# Patient Record
Sex: FEMALE | Race: WHITE | NOT HISPANIC OR LATINO | Employment: FULL TIME | URBAN - METROPOLITAN AREA
[De-identification: names, ages, dates, MRNs, and addresses within clinical notes are randomized per-mention and may not be internally consistent; named-entity substitution may affect disease eponyms.]

---

## 2017-04-04 ENCOUNTER — HISTORICAL (OUTPATIENT)
Dept: LAB | Facility: HOSPITAL | Age: 42
End: 2017-04-04

## 2017-06-23 ENCOUNTER — HISTORICAL (OUTPATIENT)
Dept: LAB | Facility: HOSPITAL | Age: 42
End: 2017-06-23

## 2017-06-23 LAB
T3FREE SERPL-MCNC: 3.83 PG/ML (ref 2.18–3.98)
T4 SERPL-MCNC: 9.3 MCG/DL (ref 4.7–13.3)
TESTOST SERPL-MCNC: 75.5 NG/DL (ref 14–76)
TSH SERPL-ACNC: 1.31 MIU/ML (ref 0.36–3.74)

## 2017-11-30 ENCOUNTER — HISTORICAL (OUTPATIENT)
Dept: LAB | Facility: HOSPITAL | Age: 42
End: 2017-11-30

## 2017-11-30 LAB
ABS NEUT (OLG): 1.94 X10(3)/MCL (ref 2.1–9.2)
ALBUMIN SERPL-MCNC: 3.4 GM/DL (ref 3.4–5)
ALBUMIN/GLOB SERPL: 1 {RATIO}
ALP SERPL-CCNC: 73 UNIT/L (ref 45–117)
ALT SERPL-CCNC: 37 UNIT/L (ref 13–56)
APPEARANCE, UA: ABNORMAL
AST SERPL-CCNC: 54 UNIT/L (ref 15–37)
BACTERIA SPEC CULT: ABNORMAL /HPF
BILIRUB SERPL-MCNC: 0.3 MG/DL (ref 0.2–1)
BILIRUB UR QL STRIP: NEGATIVE
BILIRUBIN DIRECT+TOT PNL SERPL-MCNC: 0.1 MG/DL (ref 0–0.2)
BILIRUBIN DIRECT+TOT PNL SERPL-MCNC: 0.2 MG/DL (ref 0–1)
BUN SERPL-MCNC: 7 MG/DL (ref 7–18)
CALCIUM SERPL-MCNC: 8.6 MG/DL (ref 8.5–10.1)
CHLORIDE SERPL-SCNC: 103 MMOL/L (ref 98–107)
CO2 SERPL-SCNC: 23 MMOL/L (ref 21–32)
COLOR UR: YELLOW
CREAT SERPL-MCNC: 0.89 MG/DL (ref 0.55–1.02)
ERYTHROCYTE [DISTWIDTH] IN BLOOD BY AUTOMATED COUNT: 13.3 % (ref 11.5–17)
GLOBULIN SER-MCNC: 4 GM/DL (ref 2–4)
GLUCOSE (UA): NEGATIVE
GLUCOSE SERPL-MCNC: 94 MG/DL (ref 74–106)
HCT VFR BLD AUTO: 39.4 % (ref 37–47)
HGB BLD-MCNC: 13.6 GM/DL (ref 12–16)
HGB UR QL STRIP: ABNORMAL
KETONES UR QL STRIP: NEGATIVE
LEUKOCYTE ESTERASE UR QL STRIP: ABNORMAL
MCH RBC QN AUTO: 35.3 PG (ref 27–31)
MCHC RBC AUTO-ENTMCNC: 34.5 GM/DL (ref 33–36)
MCV RBC AUTO: 102.3 FL (ref 80–94)
NITRITE UR QL STRIP: NEGATIVE
PH UR STRIP: 6 [PH] (ref 5–7)
PLATELET # BLD AUTO: 226 X10(3)/MCL (ref 130–400)
PMV BLD AUTO: 9.6 FL (ref 7.4–10.4)
POTASSIUM SERPL-SCNC: 3.7 MMOL/L (ref 3.5–5.1)
PROT SERPL-MCNC: 7.7 GM/DL (ref 6.4–8.2)
PROT UR QL STRIP: ABNORMAL
RBC # BLD AUTO: 3.85 X10(6)/MCL (ref 4.2–5.4)
RBC #/AREA URNS HPF: ABNORMAL /HPF
SODIUM SERPL-SCNC: 139 MMOL/L (ref 136–145)
SP GR UR STRIP: 1.02 (ref 1–1.03)
SQUAMOUS EPITHELIAL, UA: ABNORMAL /LPF
UROBILINOGEN UR STRIP-ACNC: NEGATIVE
WBC # SPEC AUTO: 4.1 X10(3)/MCL (ref 4.5–11.5)
WBC #/AREA URNS HPF: ABNORMAL /HPF

## 2017-12-02 LAB — FINAL CULTURE: NO GROWTH

## 2018-02-01 ENCOUNTER — HISTORICAL (OUTPATIENT)
Dept: LAB | Facility: HOSPITAL | Age: 43
End: 2018-02-01

## 2018-02-01 LAB
ALBUMIN SERPL-MCNC: 3.6 GM/DL (ref 3.4–5)
BUN SERPL-MCNC: 10 MG/DL (ref 7–18)
CALCIUM SERPL-MCNC: 8.6 MG/DL (ref 8.5–10.1)
CHLORIDE SERPL-SCNC: 98 MMOL/L (ref 98–107)
CO2 SERPL-SCNC: 26 MMOL/L (ref 21–32)
CREAT SERPL-MCNC: 0.83 MG/DL (ref 0.55–1.02)
GLUCOSE SERPL-MCNC: 103 MG/DL (ref 74–106)
MAGNESIUM SERPL-MCNC: 1.6 MG/DL (ref 1.8–2.4)
PHOSPHATE SERPL-MCNC: 2.9 MG/DL (ref 2.5–4.9)
POTASSIUM SERPL-SCNC: 3.7 MMOL/L (ref 3.5–5.1)
SODIUM SERPL-SCNC: 134 MMOL/L (ref 136–145)
URATE SERPL-MCNC: 9.3 MG/DL (ref 2.6–6)

## 2018-05-10 ENCOUNTER — HISTORICAL (OUTPATIENT)
Dept: LAB | Facility: HOSPITAL | Age: 43
End: 2018-05-10

## 2018-05-10 LAB
ABS NEUT (OLG): 2.3 X10(3)/MCL (ref 2.1–9.2)
ALBUMIN SERPL-MCNC: 3.6 GM/DL (ref 3.4–5)
ALBUMIN/GLOB SERPL: 1 {RATIO}
ALP SERPL-CCNC: 66 UNIT/L (ref 45–117)
ALT SERPL-CCNC: 27 UNIT/L (ref 13–56)
APPEARANCE, UA: CLEAR
AST SERPL-CCNC: 26 UNIT/L (ref 15–37)
BILIRUB SERPL-MCNC: 0.3 MG/DL (ref 0.2–1)
BILIRUB UR QL STRIP: NEGATIVE
BILIRUBIN DIRECT+TOT PNL SERPL-MCNC: <0.1 MG/DL (ref 0–0.2)
BILIRUBIN DIRECT+TOT PNL SERPL-MCNC: >0.2 MG/DL (ref 0–1)
BUN SERPL-MCNC: 9 MG/DL (ref 7–18)
CALCIUM SERPL-MCNC: 8.7 MG/DL (ref 8.5–10.1)
CHLORIDE SERPL-SCNC: 101 MMOL/L (ref 98–107)
CO2 SERPL-SCNC: 25 MMOL/L (ref 21–32)
COLOR UR: NORMAL
CREAT SERPL-MCNC: 0.84 MG/DL (ref 0.55–1.02)
DEPRECATED CALCIDIOL+CALCIFEROL SERPL-MC: 35 NG/ML (ref 30–80)
ERYTHROCYTE [DISTWIDTH] IN BLOOD BY AUTOMATED COUNT: 13.7 % (ref 11.5–17)
GLOBULIN SER-MCNC: 4 GM/DL (ref 2–4)
GLUCOSE (UA): NEGATIVE
GLUCOSE SERPL-MCNC: 87 MG/DL (ref 74–106)
HCT VFR BLD AUTO: 37.7 % (ref 37–47)
HGB BLD-MCNC: 13.1 GM/DL (ref 12–16)
HGB UR QL STRIP: NEGATIVE
KETONES UR QL STRIP: NEGATIVE
LEUKOCYTE ESTERASE UR QL STRIP: NEGATIVE
MAGNESIUM SERPL-MCNC: 2.1 MG/DL (ref 1.8–2.4)
MCH RBC QN AUTO: 37.1 PG (ref 27–31)
MCHC RBC AUTO-ENTMCNC: 34.7 GM/DL (ref 33–36)
MCV RBC AUTO: 106.8 FL (ref 80–94)
NITRITE UR QL STRIP: NEGATIVE
PH UR STRIP: 6 [PH] (ref 5–7)
PLATELET # BLD AUTO: 262 X10(3)/MCL (ref 130–400)
PLATELET # BLD EST: ADEQUATE 10*3/UL
PMV BLD AUTO: 10.1 FL (ref 7.4–10.4)
POTASSIUM SERPL-SCNC: 3.4 MMOL/L (ref 3.5–5.1)
PROT SERPL-MCNC: 7.6 GM/DL (ref 6.4–8.2)
PROT UR QL STRIP: NEGATIVE
PTH-INTACT SERPL-MCNC: 57.8 PG/ML (ref 18.4–80.1)
RBC # BLD AUTO: 3.53 X10(6)/MCL (ref 4.2–5.4)
RBC MORPH BLD: ABNORMAL
SODIUM SERPL-SCNC: 138 MMOL/L (ref 136–145)
SP GR UR STRIP: 1.01 (ref 1–1.03)
TESTOST SERPL-MCNC: 186 NG/DL (ref 14–76)
URATE SERPL-MCNC: 10.9 MG/DL (ref 2.6–6)
UROBILINOGEN UR STRIP-ACNC: NEGATIVE
WBC # SPEC AUTO: 4.7 X10(3)/MCL (ref 4.5–11.5)

## 2020-09-18 ENCOUNTER — HISTORICAL (OUTPATIENT)
Dept: LAB | Facility: HOSPITAL | Age: 45
End: 2020-09-18

## 2020-09-18 LAB
ABS NEUT (OLG): 3.31 X10(3)/MCL (ref 2.1–9.2)
ALBUMIN SERPL-MCNC: 3.9 GM/DL (ref 3.5–5)
ALBUMIN/GLOB SERPL: 1.1 RATIO (ref 1.1–2)
ALP SERPL-CCNC: 101 UNIT/L (ref 40–150)
ALT SERPL-CCNC: 29 UNIT/L (ref 0–55)
ANISOCYTOSIS BLD QL SMEAR: NORMAL
APPEARANCE, UA: CLEAR
AST SERPL-CCNC: 40 UNIT/L (ref 5–34)
BACTERIA SPEC CULT: ABNORMAL
BILIRUB SERPL-MCNC: 0.4 MG/DL (ref 0.2–1.2)
BILIRUB UR QL STRIP: NEGATIVE
BILIRUBIN DIRECT+TOT PNL SERPL-MCNC: 0.2 MG/DL (ref 0–0.5)
BILIRUBIN DIRECT+TOT PNL SERPL-MCNC: 0.2 MG/DL (ref 0–0.8)
BUN SERPL-MCNC: 9.6 MG/DL (ref 7–18.7)
CALCIUM SERPL-MCNC: 9.7 MG/DL (ref 8.4–10.2)
CHLORIDE SERPL-SCNC: 98 MMOL/L (ref 98–107)
CO2 SERPL-SCNC: 26 MMOL/L (ref 22–29)
COLOR UR: ABNORMAL
CREAT SERPL-MCNC: 0.97 MG/DL (ref 0.57–1.11)
DEPRECATED CALCIDIOL+CALCIFEROL SERPL-MC: 58.3 NG/ML (ref 6.6–49.9)
ERYTHROCYTE [DISTWIDTH] IN BLOOD BY AUTOMATED COUNT: 15.5 % (ref 11.5–17)
FERRITIN SERPL-MCNC: 264.18 NG/ML (ref 4.63–204)
FSH SERPL-ACNC: 39.35 MIU/ML
GLOBULIN SER-MCNC: 3.4 GM/DL (ref 2.4–3.5)
GLUCOSE (UA): NEGATIVE
GLUCOSE SERPL-MCNC: 116 MG/DL (ref 74–100)
HCT VFR BLD AUTO: 35.1 % (ref 37–47)
HGB BLD-MCNC: 12.1 GM/DL (ref 12–16)
HGB UR QL STRIP: NEGATIVE
KETONES UR QL STRIP: NEGATIVE
LEUKOCYTE ESTERASE UR QL STRIP: ABNORMAL
MAGNESIUM SERPL-MCNC: 1.51 MG/DL (ref 1.6–2.6)
MCH RBC QN AUTO: 39.8 PG (ref 27–31)
MCHC RBC AUTO-ENTMCNC: 34.5 GM/DL (ref 33–36)
MCV RBC AUTO: 115.5 FL (ref 80–94)
MUCOUS THREADS URNS QL MICRO: ABNORMAL /LPF
NITRITE UR QL STRIP: NEGATIVE
NRBC BLD AUTO-RTO: 0 % (ref 0–0.2)
PH UR STRIP: 6.5 [PH] (ref 5–7)
PLATELET # BLD AUTO: 239 X10(3)/MCL (ref 130–400)
PLATELET # BLD EST: ADEQUATE 10*3/UL
PMV BLD AUTO: 10.2 FL (ref 7.4–10.4)
POLYCHROMASIA BLD QL SMEAR: SLIGHT
POTASSIUM SERPL-SCNC: 3.8 MMOL/L (ref 3.5–5.1)
PROT SERPL-MCNC: 7.3 GM/DL (ref 6.4–8.3)
PROT UR QL STRIP: NEGATIVE
RBC # BLD AUTO: 3.04 X10(6)/MCL (ref 4.2–5.4)
RBC #/AREA URNS HPF: 0 /[HPF]
SODIUM SERPL-SCNC: 137 MMOL/L (ref 136–145)
SP GR UR STRIP: 1.01 (ref 1–1.03)
SQUAMOUS EPITHELIAL, UA: ABNORMAL /LPF
T3FREE SERPL-MCNC: 2.98 PG/ML (ref 1.71–3.71)
T4 FREE SERPL-MCNC: 0.94 NG/DL (ref 0.7–1.48)
TESTOST SERPL-MCNC: 199.94 NG/DL (ref 13.84–53.35)
TSH SERPL-ACNC: 1.11 UIU/ML (ref 0.35–4.94)
URATE SERPL-MCNC: 4.8 MG/DL (ref 2.6–6)
UROBILINOGEN UR STRIP-ACNC: NEGATIVE
WBC # SPEC AUTO: 5.9 X10(3)/MCL (ref 4.5–11.5)
WBC #/AREA URNS HPF: ABNORMAL /HPF

## 2020-09-20 LAB — FINAL CULTURE: NORMAL

## 2022-06-01 LAB
HPV16+18+H RISK 12 DNA CVX-IMP: NEGATIVE
PAP RECOMMENDATION EXT: NORMAL

## 2022-06-09 ENCOUNTER — LAB VISIT (OUTPATIENT)
Dept: LAB | Facility: HOSPITAL | Age: 47
End: 2022-06-09
Attending: INTERNAL MEDICINE

## 2022-06-09 DIAGNOSIS — N28.1 ACQUIRED CYST OF KIDNEY: ICD-10-CM

## 2022-06-09 DIAGNOSIS — I10 ESSENTIAL HYPERTENSION, MALIGNANT: Primary | ICD-10-CM

## 2022-06-09 DIAGNOSIS — E55.9 VITAMIN D DEFICIENCY: ICD-10-CM

## 2022-06-09 DIAGNOSIS — E79.0 URICACIDEMIA: ICD-10-CM

## 2022-06-09 LAB
ALBUMIN SERPL-MCNC: 3.6 GM/DL (ref 3.5–5)
ALBUMIN/GLOB SERPL: 1 RATIO (ref 1.1–2)
ALP SERPL-CCNC: 70 UNIT/L (ref 40–150)
ALT SERPL-CCNC: 11 UNIT/L (ref 0–55)
APPEARANCE UR: CLEAR
AST SERPL-CCNC: 17 UNIT/L (ref 5–34)
BASOPHILS # BLD AUTO: 0.06 X10(3)/MCL (ref 0–0.2)
BASOPHILS NFR BLD AUTO: 0.8 %
BILIRUB UR QL STRIP.AUTO: ABNORMAL MG/DL
BILIRUBIN DIRECT+TOT PNL SERPL-MCNC: 0.4 MG/DL
BUN SERPL-MCNC: 9.4 MG/DL (ref 7–18.7)
CALCIUM SERPL-MCNC: 9.5 MG/DL (ref 8.4–10.2)
CHLORIDE SERPL-SCNC: 103 MMOL/L (ref 98–107)
CO2 SERPL-SCNC: 23 MMOL/L (ref 22–29)
COLOR UR AUTO: YELLOW
CREAT SERPL-MCNC: 0.95 MG/DL (ref 0.55–1.02)
DEPRECATED CALCIDIOL+CALCIFEROL SERPL-MC: 56.7 NG/ML (ref 30–80)
EOSINOPHIL # BLD AUTO: 0.14 X10(3)/MCL (ref 0–0.9)
EOSINOPHIL NFR BLD AUTO: 1.9 %
ERYTHROCYTE [DISTWIDTH] IN BLOOD BY AUTOMATED COUNT: 14.3 % (ref 11.5–17)
GLOBULIN SER-MCNC: 3.6 GM/DL (ref 2.4–3.5)
GLUCOSE SERPL-MCNC: 107 MG/DL (ref 74–100)
GLUCOSE UR QL STRIP.AUTO: NEGATIVE MG/DL
HCT VFR BLD AUTO: 38.3 % (ref 37–47)
HGB BLD-MCNC: 13.1 GM/DL (ref 12–16)
IMM GRANULOCYTES # BLD AUTO: 0.03 X10(3)/MCL (ref 0–0.02)
IMM GRANULOCYTES NFR BLD AUTO: 0.4 % (ref 0–0.43)
KETONES UR QL STRIP.AUTO: 15 MG/DL
LEUKOCYTE ESTERASE UR QL STRIP.AUTO: NEGATIVE UNIT/L
LYMPHOCYTES # BLD AUTO: 2.24 X10(3)/MCL (ref 0.6–4.6)
LYMPHOCYTES NFR BLD AUTO: 30.9 %
MACROCYTES BLD QL SMEAR: ABNORMAL
MCH RBC QN AUTO: 37.2 PG (ref 27–31)
MCHC RBC AUTO-ENTMCNC: 34.2 MG/DL (ref 33–36)
MCV RBC AUTO: 108.8 FL (ref 80–94)
MONOCYTES # BLD AUTO: 0.48 X10(3)/MCL (ref 0.1–1.3)
MONOCYTES NFR BLD AUTO: 6.6 %
NEUTROPHILS # BLD AUTO: 4.3 X10(3)/MCL (ref 2.1–9.2)
NEUTROPHILS NFR BLD AUTO: 59.4 %
NITRITE UR QL STRIP.AUTO: NEGATIVE
NRBC BLD AUTO-RTO: 0 %
PH UR STRIP.AUTO: 6.5 [PH]
PLATELET # BLD AUTO: 311 X10(3)/MCL (ref 130–400)
PLATELET # BLD EST: ABNORMAL 10*3/UL
PMV BLD AUTO: 10.1 FL (ref 9.4–12.4)
POLYCHROMASIA BLD QL SMEAR: SLIGHT
POTASSIUM SERPL-SCNC: 4.5 MMOL/L (ref 3.5–5.1)
PROT SERPL-MCNC: 7.2 GM/DL (ref 6.4–8.3)
PROT UR QL STRIP.AUTO: NEGATIVE MG/DL
PTH-INTACT SERPL-MCNC: 61.9 PG/ML (ref 8.7–77)
RBC # BLD AUTO: 3.52 X10(6)/MCL (ref 4.2–5.4)
RBC MORPH BLD: ABNORMAL
RBC UR QL AUTO: NEGATIVE UNIT/L
SODIUM SERPL-SCNC: 140 MMOL/L (ref 136–145)
SP GR UR STRIP.AUTO: 1.02
URATE SERPL-MCNC: 7.9 MG/DL (ref 2.6–6)
UROBILINOGEN UR STRIP-ACNC: 0.2 MG/DL
WBC # SPEC AUTO: 7.2 X10(3)/MCL (ref 4.5–11.5)

## 2022-06-09 PROCEDURE — 83970 ASSAY OF PARATHORMONE: CPT

## 2022-06-09 PROCEDURE — 81003 URINALYSIS AUTO W/O SCOPE: CPT

## 2022-06-09 PROCEDURE — 80053 COMPREHEN METABOLIC PANEL: CPT

## 2022-06-09 PROCEDURE — 36415 COLL VENOUS BLD VENIPUNCTURE: CPT

## 2022-06-09 PROCEDURE — 85025 COMPLETE CBC W/AUTO DIFF WBC: CPT

## 2022-06-09 PROCEDURE — 84550 ASSAY OF BLOOD/URIC ACID: CPT

## 2022-06-09 PROCEDURE — 82306 VITAMIN D 25 HYDROXY: CPT

## 2022-09-13 ENCOUNTER — HOSPITAL ENCOUNTER (INPATIENT)
Facility: HOSPITAL | Age: 47
LOS: 7 days | Discharge: HOME | DRG: 164 | End: 2022-09-21
Attending: EMERGENCY MEDICINE | Admitting: INTERNAL MEDICINE
Payer: MEDICARE

## 2022-09-13 DIAGNOSIS — R16.0 LIVER MASS: ICD-10-CM

## 2022-09-13 DIAGNOSIS — I82.409 DVT (DEEP VENOUS THROMBOSIS): ICD-10-CM

## 2022-09-13 DIAGNOSIS — I26.99 ACUTE PULMONARY EMBOLISM, UNSPECIFIED PULMONARY EMBOLISM TYPE, UNSPECIFIED WHETHER ACUTE COR PULMONALE PRESENT: Primary | ICD-10-CM

## 2022-09-13 DIAGNOSIS — R53.1 WEAKNESS: ICD-10-CM

## 2022-09-13 DIAGNOSIS — R07.9 CHEST PAIN: ICD-10-CM

## 2022-09-13 DIAGNOSIS — R11.2 INTRACTABLE VOMITING WITH NAUSEA, UNSPECIFIED VOMITING TYPE: ICD-10-CM

## 2022-09-13 DIAGNOSIS — I26.99 PULMONARY EMBOLUS: ICD-10-CM

## 2022-09-13 LAB
ALBUMIN SERPL-MCNC: 3 GM/DL (ref 3.5–5)
ALBUMIN/GLOB SERPL: 0.8 RATIO (ref 1.1–2)
ALP SERPL-CCNC: 91 UNIT/L (ref 40–150)
ALT SERPL-CCNC: 112 UNIT/L (ref 0–55)
ANISOCYTOSIS BLD QL SMEAR: ABNORMAL
APTT PPP: 31 SECONDS (ref 23.4–33.9)
AST SERPL-CCNC: 79 UNIT/L (ref 5–34)
B-OH-BUTYR SERPL-MCNC: 2.6 MMOL/L
BASOPHILS # BLD AUTO: 0.03 X10(3)/MCL (ref 0–0.2)
BASOPHILS NFR BLD AUTO: 0.3 %
BILIRUBIN DIRECT+TOT PNL SERPL-MCNC: 0.3 MG/DL
BUN SERPL-MCNC: 15.1 MG/DL (ref 7–18.7)
CALCIUM SERPL-MCNC: 9 MG/DL (ref 8.4–10.2)
CHLORIDE SERPL-SCNC: 108 MMOL/L (ref 98–107)
CO2 SERPL-SCNC: 17 MMOL/L (ref 22–29)
CREAT SERPL-MCNC: 1.16 MG/DL (ref 0.55–1.02)
EOSINOPHIL # BLD AUTO: 0.02 X10(3)/MCL (ref 0–0.9)
EOSINOPHIL NFR BLD AUTO: 0.2 %
ERYTHROCYTE [DISTWIDTH] IN BLOOD BY AUTOMATED COUNT: 15 % (ref 11.5–17)
FLUAV AG UPPER RESP QL IA.RAPID: NOT DETECTED
FLUBV AG UPPER RESP QL IA.RAPID: NOT DETECTED
GFR SERPLBLD CREATININE-BSD FMLA CKD-EPI: 58 MLS/MIN/1.73/M2
GLOBULIN SER-MCNC: 3.9 GM/DL (ref 2.4–3.5)
GLUCOSE SERPL-MCNC: 125 MG/DL (ref 74–100)
HCO3 UR-SCNC: 12.7 MMOL/L (ref 24–28)
HCT VFR BLD AUTO: 30 % (ref 37–47)
HGB BLD-MCNC: 10.7 GM/DL (ref 12–16)
IMM GRANULOCYTES # BLD AUTO: 0.05 X10(3)/MCL (ref 0–0.04)
IMM GRANULOCYTES NFR BLD AUTO: 0.5 %
INR BLD: 1.08 (ref 2–3)
LACTATE SERPL-SCNC: 2.1 MMOL/L (ref 0.5–2.2)
LIPASE SERPL-CCNC: 8 U/L
LYMPHOCYTES # BLD AUTO: 2 X10(3)/MCL (ref 0.6–4.6)
LYMPHOCYTES NFR BLD AUTO: 19.6 %
MACROCYTES BLD QL SMEAR: ABNORMAL
MAGNESIUM SERPL-MCNC: 2.1 MG/DL (ref 1.6–2.6)
MCH RBC QN AUTO: 43.5 PG (ref 27–31)
MCHC RBC AUTO-ENTMCNC: 35.7 MG/DL (ref 33–36)
MCV RBC AUTO: 122 FL (ref 80–94)
MONOCYTES # BLD AUTO: 0.76 X10(3)/MCL (ref 0.1–1.3)
MONOCYTES NFR BLD AUTO: 7.5 %
NEUTROPHILS # BLD AUTO: 7.3 X10(3)/MCL (ref 2.1–9.2)
NEUTROPHILS NFR BLD AUTO: 71.9 %
NRBC BLD AUTO-RTO: 0 %
PCO2 BLDA: 18.5 MMHG (ref 35–45)
PH SMN: 7.44 [PH] (ref 7.35–7.45)
PLATELET # BLD AUTO: 258 X10(3)/MCL (ref 130–400)
PLATELET # BLD EST: ADEQUATE 10*3/UL
PMV BLD AUTO: 10.4 FL (ref 7.4–10.4)
PO2 BLDA: 77 MMHG (ref 80–100)
POC BE: -11 MMOL/L
POC SATURATED O2: 96 % (ref 95–100)
POC TCO2: 13 MMOL/L (ref 23–27)
POTASSIUM SERPL-SCNC: 4.6 MMOL/L (ref 3.5–5.1)
PROT SERPL-MCNC: 6.9 GM/DL (ref 6.4–8.3)
PROTHROMBIN TIME: 13.8 SECONDS (ref 11.7–14.5)
RBC # BLD AUTO: 2.46 X10(6)/MCL (ref 4.2–5.4)
RBC MORPH BLD: ABNORMAL
SAMPLE: ABNORMAL
SARS-COV-2 RNA RESP QL NAA+PROBE: NOT DETECTED
SODIUM SERPL-SCNC: 140 MMOL/L (ref 136–145)
TROPONIN I SERPL-MCNC: 0.28 NG/ML (ref 0–0.04)
TROPONIN I SERPL-MCNC: 0.29 NG/ML (ref 0–0.04)
WBC # SPEC AUTO: 10.2 X10(3)/MCL (ref 4.5–11.5)

## 2022-09-13 PROCEDURE — 84484 ASSAY OF TROPONIN QUANT: CPT | Performed by: PHYSICIAN ASSISTANT

## 2022-09-13 PROCEDURE — 25500020 PHARM REV CODE 255: Performed by: EMERGENCY MEDICINE

## 2022-09-13 PROCEDURE — 96372 THER/PROPH/DIAG INJ SC/IM: CPT | Performed by: PHYSICIAN ASSISTANT

## 2022-09-13 PROCEDURE — 36415 COLL VENOUS BLD VENIPUNCTURE: CPT | Performed by: EMERGENCY MEDICINE

## 2022-09-13 PROCEDURE — 96376 TX/PRO/DX INJ SAME DRUG ADON: CPT

## 2022-09-13 PROCEDURE — 25000003 PHARM REV CODE 250: Performed by: PHYSICIAN ASSISTANT

## 2022-09-13 PROCEDURE — 82010 KETONE BODYS QUAN: CPT | Performed by: EMERGENCY MEDICINE

## 2022-09-13 PROCEDURE — 83735 ASSAY OF MAGNESIUM: CPT | Performed by: PHYSICIAN ASSISTANT

## 2022-09-13 PROCEDURE — 87636 SARSCOV2 & INF A&B AMP PRB: CPT | Performed by: PHYSICIAN ASSISTANT

## 2022-09-13 PROCEDURE — 36600 WITHDRAWAL OF ARTERIAL BLOOD: CPT

## 2022-09-13 PROCEDURE — 96366 THER/PROPH/DIAG IV INF ADDON: CPT

## 2022-09-13 PROCEDURE — 93010 ELECTROCARDIOGRAM REPORT: CPT | Mod: ,,, | Performed by: INTERNAL MEDICINE

## 2022-09-13 PROCEDURE — 99285 EMERGENCY DEPT VISIT HI MDM: CPT | Mod: 25

## 2022-09-13 PROCEDURE — 63600175 PHARM REV CODE 636 W HCPCS: Performed by: EMERGENCY MEDICINE

## 2022-09-13 PROCEDURE — 80053 COMPREHEN METABOLIC PANEL: CPT | Performed by: PHYSICIAN ASSISTANT

## 2022-09-13 PROCEDURE — 63600175 PHARM REV CODE 636 W HCPCS: Performed by: PHYSICIAN ASSISTANT

## 2022-09-13 PROCEDURE — 93010 EKG 12-LEAD: ICD-10-PCS | Mod: ,,, | Performed by: INTERNAL MEDICINE

## 2022-09-13 PROCEDURE — 93005 ELECTROCARDIOGRAM TRACING: CPT

## 2022-09-13 PROCEDURE — 85610 PROTHROMBIN TIME: CPT | Performed by: EMERGENCY MEDICINE

## 2022-09-13 PROCEDURE — 36415 COLL VENOUS BLD VENIPUNCTURE: CPT | Performed by: PHYSICIAN ASSISTANT

## 2022-09-13 PROCEDURE — 83605 ASSAY OF LACTIC ACID: CPT | Performed by: EMERGENCY MEDICINE

## 2022-09-13 PROCEDURE — 83690 ASSAY OF LIPASE: CPT | Performed by: PHYSICIAN ASSISTANT

## 2022-09-13 PROCEDURE — 99900035 HC TECH TIME PER 15 MIN (STAT)

## 2022-09-13 PROCEDURE — 82803 BLOOD GASES ANY COMBINATION: CPT

## 2022-09-13 PROCEDURE — 96365 THER/PROPH/DIAG IV INF INIT: CPT

## 2022-09-13 PROCEDURE — 96361 HYDRATE IV INFUSION ADD-ON: CPT

## 2022-09-13 PROCEDURE — 96375 TX/PRO/DX INJ NEW DRUG ADDON: CPT

## 2022-09-13 PROCEDURE — 85730 THROMBOPLASTIN TIME PARTIAL: CPT | Performed by: EMERGENCY MEDICINE

## 2022-09-13 PROCEDURE — 85025 COMPLETE CBC W/AUTO DIFF WBC: CPT | Performed by: PHYSICIAN ASSISTANT

## 2022-09-13 PROCEDURE — 87040 BLOOD CULTURE FOR BACTERIA: CPT | Performed by: EMERGENCY MEDICINE

## 2022-09-13 RX ORDER — ONDANSETRON 2 MG/ML
4 INJECTION INTRAMUSCULAR; INTRAVENOUS
Status: COMPLETED | OUTPATIENT
Start: 2022-09-13 | End: 2022-09-13

## 2022-09-13 RX ORDER — PROMETHAZINE HYDROCHLORIDE 25 MG/ML
25 INJECTION, SOLUTION INTRAMUSCULAR; INTRAVENOUS ONCE
Status: COMPLETED | OUTPATIENT
Start: 2022-09-13 | End: 2022-09-13

## 2022-09-13 RX ORDER — HEPARIN SODIUM,PORCINE/D5W 25000/250
18 INTRAVENOUS SOLUTION INTRAVENOUS CONTINUOUS
Status: DISCONTINUED | OUTPATIENT
Start: 2022-09-13 | End: 2022-09-14

## 2022-09-13 RX ORDER — ONDANSETRON 2 MG/ML
INJECTION INTRAMUSCULAR; INTRAVENOUS
Status: COMPLETED
Start: 2022-09-13 | End: 2022-09-13

## 2022-09-13 RX ORDER — SODIUM CHLORIDE, SODIUM LACTATE, POTASSIUM CHLORIDE, CALCIUM CHLORIDE 600; 310; 30; 20 MG/100ML; MG/100ML; MG/100ML; MG/100ML
1000 INJECTION, SOLUTION INTRAVENOUS
Status: COMPLETED | OUTPATIENT
Start: 2022-09-13 | End: 2022-09-13

## 2022-09-13 RX ORDER — DROPERIDOL 2.5 MG/ML
1.25 INJECTION, SOLUTION INTRAMUSCULAR; INTRAVENOUS
Status: COMPLETED | OUTPATIENT
Start: 2022-09-13 | End: 2022-09-13

## 2022-09-13 RX ADMIN — SODIUM CHLORIDE, POTASSIUM CHLORIDE, SODIUM LACTATE AND CALCIUM CHLORIDE 1000 ML: 600; 310; 30; 20 INJECTION, SOLUTION INTRAVENOUS at 07:09

## 2022-09-13 RX ADMIN — PROMETHAZINE HYDROCHLORIDE 25 MG: 25 INJECTION INTRAMUSCULAR; INTRAVENOUS at 07:09

## 2022-09-13 RX ADMIN — ONDANSETRON 4 MG: 2 INJECTION INTRAMUSCULAR; INTRAVENOUS at 05:09

## 2022-09-13 RX ADMIN — HEPARIN SODIUM 18 UNITS/KG/HR: 10000 INJECTION, SOLUTION INTRAVENOUS at 08:09

## 2022-09-13 RX ADMIN — IOPAMIDOL 100 ML: 755 INJECTION, SOLUTION INTRAVENOUS at 06:09

## 2022-09-13 RX ADMIN — SODIUM CHLORIDE 1000 ML: 9 INJECTION, SOLUTION INTRAVENOUS at 04:09

## 2022-09-13 RX ADMIN — DROPERIDOL 1.25 MG: 2.5 INJECTION, SOLUTION INTRAMUSCULAR; INTRAVENOUS at 04:09

## 2022-09-13 RX ADMIN — ONDANSETRON 4 MG: 2 INJECTION INTRAMUSCULAR; INTRAVENOUS at 09:09

## 2022-09-13 NOTE — Clinical Note
The right neck was prepped. The site was prepped with ChloraPrep. The patient was draped. The patient was positioned supine. The patient was secured with ulnar pads.

## 2022-09-13 NOTE — Clinical Note
The catheter was inserted into the  distal right iliac vein. Aspiration thrombectomy performed right iliac vein .

## 2022-09-13 NOTE — Clinical Note
The catheter was inserted into the  ostial right pulm artery. Angiogram performed right pulm artery.

## 2022-09-13 NOTE — Clinical Note
The catheter was inserted into the left pulm artery. An angiography was performed of the left pulm artery.

## 2022-09-13 NOTE — Clinical Note
A venogram was performed of the inferior vena cava. The vessel was injected via power injection The venogram syringe was removed and the venogram is complete.

## 2022-09-13 NOTE — Clinical Note
The catheter was inserted into the left pulm artery. An angiography was performed of the left pulm artery. The angiography was performed via hand injection with .

## 2022-09-13 NOTE — Clinical Note
The catheter was repositioned into the left pulm artery. Aspiration thrombectomy performed left pulm artery

## 2022-09-13 NOTE — Clinical Note
The catheter was inserted into the left pulm artery. Aspiration thrombectomy performed  left pulm artery

## 2022-09-14 ENCOUNTER — APPOINTMENT (OUTPATIENT)
Dept: CARDIOLOGY | Facility: HOSPITAL | Age: 47
End: 2022-09-14

## 2022-09-14 PROBLEM — I26.99 BILATERAL PULMONARY EMBOLISM: Status: ACTIVE | Noted: 2022-09-14

## 2022-09-14 LAB
ALBUMIN SERPL-MCNC: 3 GM/DL (ref 3.5–5)
ALBUMIN/GLOB SERPL: 0.9 RATIO (ref 1.1–2)
ALP SERPL-CCNC: 97 UNIT/L (ref 40–150)
ALT SERPL-CCNC: 142 UNIT/L (ref 0–55)
AMPHET UR QL SCN: NEGATIVE
APPEARANCE UR: CLEAR
APTT PPP: 38.6 SECONDS (ref 23.2–33.7)
APTT PPP: 38.7 SECONDS (ref 23.2–33.7)
APTT PPP: 43.5 SECONDS (ref 23.2–33.7)
APTT PPP: 46 SECONDS (ref 23.2–33.7)
AST SERPL-CCNC: 125 UNIT/L (ref 5–34)
AV INDEX (PROSTH): 0.64
AV MEAN GRADIENT: 5 MMHG
AV PEAK GRADIENT: 10 MMHG
AV VALVE AREA: 12.02 CM2
AV VELOCITY RATIO: 0.59
B-HCG SERPL QL: NEGATIVE
BACTERIA #/AREA URNS AUTO: ABNORMAL /HPF
BARBITURATE SCN PRESENT UR: NEGATIVE
BASOPHILS # BLD AUTO: 0.02 X10(3)/MCL (ref 0–0.2)
BASOPHILS # BLD AUTO: 0.03 X10(3)/MCL (ref 0–0.2)
BASOPHILS NFR BLD AUTO: 0.2 %
BASOPHILS NFR BLD AUTO: 0.3 %
BENZODIAZ UR QL SCN: NEGATIVE
BILIRUB UR QL STRIP.AUTO: 2 MG/DL
BILIRUBIN DIRECT+TOT PNL SERPL-MCNC: 0.4 MG/DL
BNP BLD-MCNC: 1419.4 PG/ML
BSA FOR ECHO PROCEDURE: 1.84 M2
BUN SERPL-MCNC: 14 MG/DL (ref 7–18.7)
CALCIUM SERPL-MCNC: 8.4 MG/DL (ref 8.4–10.2)
CANNABINOIDS UR QL SCN: POSITIVE
CHLORIDE SERPL-SCNC: 106 MMOL/L (ref 98–107)
CO2 SERPL-SCNC: 14 MMOL/L (ref 22–29)
COCAINE UR QL SCN: NEGATIVE
COLOR UR AUTO: YELLOW
CREAT SERPL-MCNC: 1.03 MG/DL (ref 0.55–1.02)
CV ECHO LV RWT: 0.72 CM
DOP CALC AO PEAK VEL: 1.55 M/S
DOP CALC AO VTI: 19.6 CM
DOP CALC LVOT AREA: 18.8 CM2
DOP CALC LVOT DIAMETER: 4.9 CM
DOP CALC LVOT PEAK VEL: 0.92 M/S
DOP CALC LVOT STROKE VOLUME: 235.6 CM3
DOP CALCLVOT PEAK VEL VTI: 12.5 CM
E WAVE DECELERATION TIME: 169 MSEC
E/A RATIO: 0.89
E/E' RATIO: 7.83 M/S
ECHO LV POSTERIOR WALL: 1.24 CM (ref 0.6–1.1)
EJECTION FRACTION: 60 %
EOSINOPHIL # BLD AUTO: 0.01 X10(3)/MCL (ref 0–0.9)
EOSINOPHIL # BLD AUTO: 0.02 X10(3)/MCL (ref 0–0.9)
EOSINOPHIL NFR BLD AUTO: 0.1 %
EOSINOPHIL NFR BLD AUTO: 0.2 %
ERYTHROCYTE [DISTWIDTH] IN BLOOD BY AUTOMATED COUNT: 15 % (ref 11.5–17)
ERYTHROCYTE [DISTWIDTH] IN BLOOD BY AUTOMATED COUNT: 15.2 % (ref 11.5–17)
FENTANYL UR QL SCN: NEGATIVE
FRACTIONAL SHORTENING: 35 % (ref 28–44)
GFR SERPLBLD CREATININE-BSD FMLA CKD-EPI: >60 MLS/MIN/1.73/M2
GLOBULIN SER-MCNC: 3.2 GM/DL (ref 2.4–3.5)
GLUCOSE SERPL-MCNC: 125 MG/DL (ref 74–100)
GLUCOSE UR QL STRIP.AUTO: NEGATIVE MG/DL
HCT VFR BLD AUTO: 26.6 % (ref 37–47)
HCT VFR BLD AUTO: 29.1 % (ref 37–47)
HGB BLD-MCNC: 9.7 GM/DL (ref 12–16)
HGB BLD-MCNC: 9.9 GM/DL (ref 12–16)
IMM GRANULOCYTES # BLD AUTO: 0.04 X10(3)/MCL (ref 0–0.04)
IMM GRANULOCYTES # BLD AUTO: 0.05 X10(3)/MCL (ref 0–0.04)
IMM GRANULOCYTES NFR BLD AUTO: 0.4 %
IMM GRANULOCYTES NFR BLD AUTO: 0.5 %
INTERVENTRICULAR SEPTUM: 1.06 CM (ref 0.6–1.1)
KETONES UR QL STRIP.AUTO: ABNORMAL MG/DL
LACTATE SERPL-SCNC: 1.5 MMOL/L (ref 0.5–2.2)
LACTATE SERPL-SCNC: 2.2 MMOL/L (ref 0.5–2.2)
LEFT ATRIUM SIZE: 2.8 CM
LEFT ATRIUM VOLUME INDEX MOD: 17.8 ML/M2
LEFT ATRIUM VOLUME MOD: 32.1 CM3
LEFT INTERNAL DIMENSION IN SYSTOLE: 2.22 CM (ref 2.1–4)
LEFT VENTRICLE DIASTOLIC VOLUME INDEX: 26.94 ML/M2
LEFT VENTRICLE DIASTOLIC VOLUME: 48.5 ML
LEFT VENTRICLE MASS INDEX: 69 G/M2
LEFT VENTRICLE SYSTOLIC VOLUME INDEX: 9.2 ML/M2
LEFT VENTRICLE SYSTOLIC VOLUME: 16.6 ML
LEFT VENTRICULAR INTERNAL DIMENSION IN DIASTOLE: 3.43 CM (ref 3.5–6)
LEFT VENTRICULAR MASS: 123.55 G
LEUKOCYTE ESTERASE UR QL STRIP.AUTO: NEGATIVE UNIT/L
LV LATERAL E/E' RATIO: 9.4 M/S
LV SEPTAL E/E' RATIO: 6.71 M/S
LVOT MG: 2 MMHG
LVOT MV: 0.6 CM/S
LYMPHOCYTES # BLD AUTO: 2.25 X10(3)/MCL (ref 0.6–4.6)
LYMPHOCYTES # BLD AUTO: 3.05 X10(3)/MCL (ref 0.6–4.6)
LYMPHOCYTES NFR BLD AUTO: 20.8 %
LYMPHOCYTES NFR BLD AUTO: 28.3 %
MAGNESIUM SERPL-MCNC: 2 MG/DL (ref 1.6–2.6)
MCH RBC QN AUTO: 41.6 PG (ref 27–31)
MCH RBC QN AUTO: 44.7 PG (ref 27–31)
MCHC RBC AUTO-ENTMCNC: 34 MG/DL (ref 33–36)
MCHC RBC AUTO-ENTMCNC: 36.5 MG/DL (ref 33–36)
MCV RBC AUTO: 122.3 FL (ref 80–94)
MCV RBC AUTO: 122.6 FL (ref 80–94)
MDMA UR QL SCN: NEGATIVE
MONOCYTES # BLD AUTO: 0.74 X10(3)/MCL (ref 0.1–1.3)
MONOCYTES # BLD AUTO: 0.78 X10(3)/MCL (ref 0.1–1.3)
MONOCYTES NFR BLD AUTO: 6.8 %
MONOCYTES NFR BLD AUTO: 7.2 %
MV PEAK A VEL: 0.53 M/S
MV PEAK E VEL: 0.47 M/S
NEUTROPHILS # BLD AUTO: 6.9 X10(3)/MCL (ref 2.1–9.2)
NEUTROPHILS # BLD AUTO: 7.7 X10(3)/MCL (ref 2.1–9.2)
NEUTROPHILS NFR BLD AUTO: 63.6 %
NEUTROPHILS NFR BLD AUTO: 71.6 %
NITRITE UR QL STRIP.AUTO: NEGATIVE
NRBC BLD AUTO-RTO: 0 %
NRBC BLD AUTO-RTO: 0 %
OPIATES UR QL SCN: NEGATIVE
PCP UR QL: NEGATIVE
PH UR STRIP.AUTO: 6 [PH]
PH UR: 6 [PH] (ref 3–11)
PHOSPHATE SERPL-MCNC: 3.6 MG/DL (ref 2.3–4.7)
PLATELET # BLD AUTO: 266 X10(3)/MCL (ref 130–400)
PLATELET # BLD AUTO: 276 X10(3)/MCL (ref 130–400)
PMV BLD AUTO: 10.5 FL (ref 7.4–10.4)
PMV BLD AUTO: 10.6 FL (ref 7.4–10.4)
POTASSIUM SERPL-SCNC: 4.4 MMOL/L (ref 3.5–5.1)
PROT SERPL-MCNC: 6.2 GM/DL (ref 6.4–8.3)
PROT UR QL STRIP.AUTO: ABNORMAL MG/DL
PV PEAK VELOCITY: 0.95 CM/S
RA PRESSURE: 15 MMHG
RBC # BLD AUTO: 2.17 X10(6)/MCL (ref 4.2–5.4)
RBC # BLD AUTO: 2.38 X10(6)/MCL (ref 4.2–5.4)
RBC #/AREA URNS AUTO: 6 /HPF
RBC UR QL AUTO: 1 UNIT/L
RIGHT VENTRICLE STRAIN: 6
RIGHT VENTRICULAR AREA CHANGE (APICAL 4-CHAMBER VIEW): 24.4 %
RIGHT VENTRICULAR AREA IN SYSTOLE (APICAL 4-CHAMBER VIEW): 20.4 CM2
RIGHT VENTRICULAR END-DIASTOLIC DIMENSION: 4.8 CM
RV TISSUE DOPPLER FREE WALL SYSTOLIC VELOCITY 1 (APICAL 4 CHAMBER VIEW): 9 CM/S
SODIUM SERPL-SCNC: 135 MMOL/L (ref 136–145)
SP GR UR STRIP.AUTO: 1.01 (ref 1–1.03)
SPECIFIC GRAVITY, URINE AUTO (.000) (OHS): 1.01 (ref 1–1.03)
SQUAMOUS #/AREA URNS AUTO: <5 /HPF
TDI LATERAL: 0.05 M/S
TDI SEPTAL: 0.07 M/S
TDI: 0.06 M/S
TRICUSPID ANNULAR PLANE SYSTOLIC EXCURSION: 1.07 CM
TROPONIN I SERPL-MCNC: 0.16 NG/ML (ref 0–0.04)
TROPONIN I SERPL-MCNC: 0.19 NG/ML (ref 0–0.04)
TROPONIN I SERPL-MCNC: 0.21 NG/ML (ref 0–0.04)
UROBILINOGEN UR STRIP-ACNC: 0.2 MG/DL
WBC # SPEC AUTO: 10.8 X10(3)/MCL (ref 4.5–11.5)
WBC # SPEC AUTO: 10.8 X10(3)/MCL (ref 4.5–11.5)
WBC #/AREA URNS AUTO: <5 /HPF

## 2022-09-14 PROCEDURE — 99153 MOD SED SAME PHYS/QHP EA: CPT | Performed by: INTERNAL MEDICINE

## 2022-09-14 PROCEDURE — 93306 ECHO (CUPID ONLY): ICD-10-PCS | Mod: 26,,, | Performed by: INTERNAL MEDICINE

## 2022-09-14 PROCEDURE — 93005 ELECTROCARDIOGRAM TRACING: CPT

## 2022-09-14 PROCEDURE — 25500020 PHARM REV CODE 255: Performed by: INTERNAL MEDICINE

## 2022-09-14 PROCEDURE — 63600175 PHARM REV CODE 636 W HCPCS: Performed by: NURSE PRACTITIONER

## 2022-09-14 PROCEDURE — C1757 CATH, THROMBECTOMY/EMBOLECT: HCPCS | Performed by: INTERNAL MEDICINE

## 2022-09-14 PROCEDURE — 81001 URINALYSIS AUTO W/SCOPE: CPT | Performed by: EMERGENCY MEDICINE

## 2022-09-14 PROCEDURE — 83605 ASSAY OF LACTIC ACID: CPT | Performed by: EMERGENCY MEDICINE

## 2022-09-14 PROCEDURE — 75743 ARTERY X-RAYS LUNGS: CPT | Mod: 59 | Performed by: INTERNAL MEDICINE

## 2022-09-14 PROCEDURE — 63600175 PHARM REV CODE 636 W HCPCS: Performed by: INTERNAL MEDICINE

## 2022-09-14 PROCEDURE — 82105 ALPHA-FETOPROTEIN SERUM: CPT | Performed by: PHYSICIAN ASSISTANT

## 2022-09-14 PROCEDURE — 36014 PLACE CATHETER IN ARTERY: CPT | Mod: 50 | Performed by: INTERNAL MEDICINE

## 2022-09-14 PROCEDURE — 37184 PRIM ART M-THRMBC 1ST VSL: CPT | Mod: 50 | Performed by: INTERNAL MEDICINE

## 2022-09-14 PROCEDURE — 11000001 HC ACUTE MED/SURG PRIVATE ROOM

## 2022-09-14 PROCEDURE — 93010 EKG 12-LEAD: ICD-10-PCS | Mod: ,,, | Performed by: INTERNAL MEDICINE

## 2022-09-14 PROCEDURE — 93306 TTE W/DOPPLER COMPLETE: CPT | Mod: 26,,, | Performed by: INTERNAL MEDICINE

## 2022-09-14 PROCEDURE — 80053 COMPREHEN METABOLIC PANEL: CPT | Performed by: NURSE PRACTITIONER

## 2022-09-14 PROCEDURE — 85025 COMPLETE CBC W/AUTO DIFF WBC: CPT | Performed by: EMERGENCY MEDICINE

## 2022-09-14 PROCEDURE — 83735 ASSAY OF MAGNESIUM: CPT | Performed by: NURSE PRACTITIONER

## 2022-09-14 PROCEDURE — 85730 THROMBOPLASTIN TIME PARTIAL: CPT | Performed by: INTERNAL MEDICINE

## 2022-09-14 PROCEDURE — 80307 DRUG TEST PRSMV CHEM ANLYZR: CPT | Performed by: EMERGENCY MEDICINE

## 2022-09-14 PROCEDURE — 25000003 PHARM REV CODE 250: Performed by: PHYSICIAN ASSISTANT

## 2022-09-14 PROCEDURE — 93010 ELECTROCARDIOGRAM REPORT: CPT | Mod: ,,, | Performed by: INTERNAL MEDICINE

## 2022-09-14 PROCEDURE — 85730 THROMBOPLASTIN TIME PARTIAL: CPT | Performed by: EMERGENCY MEDICINE

## 2022-09-14 PROCEDURE — 27201423 OPTIME MED/SURG SUP & DEVICES STERILE SUPPLY: Performed by: INTERNAL MEDICINE

## 2022-09-14 PROCEDURE — C1894 INTRO/SHEATH, NON-LASER: HCPCS | Performed by: INTERNAL MEDICINE

## 2022-09-14 PROCEDURE — 25000003 PHARM REV CODE 250: Performed by: INTERNAL MEDICINE

## 2022-09-14 PROCEDURE — 83880 ASSAY OF NATRIURETIC PEPTIDE: CPT | Performed by: EMERGENCY MEDICINE

## 2022-09-14 PROCEDURE — 63600175 PHARM REV CODE 636 W HCPCS: Performed by: EMERGENCY MEDICINE

## 2022-09-14 PROCEDURE — 84484 ASSAY OF TROPONIN QUANT: CPT | Performed by: NURSE PRACTITIONER

## 2022-09-14 PROCEDURE — 99152 MOD SED SAME PHYS/QHP 5/>YRS: CPT | Performed by: INTERNAL MEDICINE

## 2022-09-14 PROCEDURE — 85025 COMPLETE CBC W/AUTO DIFF WBC: CPT | Performed by: PHYSICIAN ASSISTANT

## 2022-09-14 PROCEDURE — 81025 URINE PREGNANCY TEST: CPT | Performed by: EMERGENCY MEDICINE

## 2022-09-14 PROCEDURE — 84100 ASSAY OF PHOSPHORUS: CPT | Performed by: NURSE PRACTITIONER

## 2022-09-14 PROCEDURE — C1769 GUIDE WIRE: HCPCS | Performed by: INTERNAL MEDICINE

## 2022-09-14 PROCEDURE — 93306 TTE W/DOPPLER COMPLETE: CPT

## 2022-09-14 PROCEDURE — 36415 COLL VENOUS BLD VENIPUNCTURE: CPT | Performed by: EMERGENCY MEDICINE

## 2022-09-14 RX ORDER — SODIUM CHLORIDE 9 MG/ML
INJECTION, SOLUTION INTRAVENOUS CONTINUOUS
Status: DISCONTINUED | OUTPATIENT
Start: 2022-09-14 | End: 2022-09-20

## 2022-09-14 RX ORDER — POLYETHYLENE GLYCOL 3350 17 G/17G
17 POWDER, FOR SOLUTION ORAL DAILY
Status: DISCONTINUED | OUTPATIENT
Start: 2022-09-14 | End: 2022-09-15

## 2022-09-14 RX ORDER — SIMETHICONE 80 MG
1 TABLET,CHEWABLE ORAL 4 TIMES DAILY PRN
Status: DISCONTINUED | OUTPATIENT
Start: 2022-09-14 | End: 2022-09-21 | Stop reason: HOSPADM

## 2022-09-14 RX ORDER — ALLOPURINOL 100 MG/1
100 TABLET ORAL DAILY
Status: CANCELLED | OUTPATIENT
Start: 2022-09-15

## 2022-09-14 RX ORDER — HYDRALAZINE HYDROCHLORIDE 50 MG/1
50 TABLET, FILM COATED ORAL 3 TIMES DAILY
Status: ON HOLD | COMMUNITY
End: 2022-09-21 | Stop reason: HOSPADM

## 2022-09-14 RX ORDER — MAG HYDROX/ALUMINUM HYD/SIMETH 200-200-20
30 SUSPENSION, ORAL (FINAL DOSE FORM) ORAL 4 TIMES DAILY PRN
Status: DISCONTINUED | OUTPATIENT
Start: 2022-09-14 | End: 2022-09-21 | Stop reason: HOSPADM

## 2022-09-14 RX ORDER — METOPROLOL SUCCINATE 25 MG/1
75 TABLET, EXTENDED RELEASE ORAL DAILY
Status: CANCELLED | OUTPATIENT
Start: 2022-09-15

## 2022-09-14 RX ORDER — BUPROPION HYDROCHLORIDE 300 MG/1
300 TABLET ORAL DAILY
COMMUNITY

## 2022-09-14 RX ORDER — SODIUM CHLORIDE 0.9 % (FLUSH) 0.9 %
3 SYRINGE (ML) INJECTION
Status: DISCONTINUED | OUTPATIENT
Start: 2022-09-14 | End: 2022-09-21 | Stop reason: HOSPADM

## 2022-09-14 RX ORDER — MIDAZOLAM HYDROCHLORIDE 1 MG/ML
INJECTION INTRAMUSCULAR; INTRAVENOUS
Status: DISCONTINUED | OUTPATIENT
Start: 2022-09-14 | End: 2022-09-20

## 2022-09-14 RX ORDER — FUROSEMIDE 20 MG/1
20 TABLET ORAL DAILY
COMMUNITY
End: 2024-03-19 | Stop reason: SDUPTHER

## 2022-09-14 RX ORDER — NALOXONE HCL 0.4 MG/ML
0.02 VIAL (ML) INJECTION
Status: DISCONTINUED | OUTPATIENT
Start: 2022-09-14 | End: 2022-09-21 | Stop reason: HOSPADM

## 2022-09-14 RX ORDER — ACETAMINOPHEN 325 MG/1
650 TABLET ORAL EVERY 6 HOURS PRN
Status: DISCONTINUED | OUTPATIENT
Start: 2022-09-14 | End: 2022-09-21 | Stop reason: HOSPADM

## 2022-09-14 RX ORDER — METOPROLOL SUCCINATE 50 MG/1
75 TABLET, EXTENDED RELEASE ORAL DAILY
Status: ON HOLD | COMMUNITY
End: 2022-09-21 | Stop reason: HOSPADM

## 2022-09-14 RX ORDER — LIDOCAINE HYDROCHLORIDE 20 MG/ML
INJECTION, SOLUTION EPIDURAL; INFILTRATION; INTRACAUDAL; PERINEURAL
Status: DISCONTINUED | OUTPATIENT
Start: 2022-09-14 | End: 2022-09-20

## 2022-09-14 RX ORDER — BUPROPION HYDROCHLORIDE 150 MG/1
300 TABLET ORAL DAILY
Status: CANCELLED | OUTPATIENT
Start: 2022-09-15

## 2022-09-14 RX ORDER — HEPARIN SODIUM 1000 [USP'U]/ML
INJECTION, SOLUTION INTRAVENOUS; SUBCUTANEOUS
Status: DISCONTINUED | OUTPATIENT
Start: 2022-09-14 | End: 2022-09-20

## 2022-09-14 RX ORDER — TALC
6 POWDER (GRAM) TOPICAL NIGHTLY PRN
Status: DISCONTINUED | OUTPATIENT
Start: 2022-09-14 | End: 2022-09-21 | Stop reason: HOSPADM

## 2022-09-14 RX ORDER — ONDANSETRON 2 MG/ML
4 INJECTION INTRAMUSCULAR; INTRAVENOUS EVERY 4 HOURS PRN
Status: DISCONTINUED | OUTPATIENT
Start: 2022-09-14 | End: 2022-09-21 | Stop reason: HOSPADM

## 2022-09-14 RX ORDER — FUROSEMIDE 20 MG/1
20 TABLET ORAL DAILY
Status: CANCELLED | OUTPATIENT
Start: 2022-09-15

## 2022-09-14 RX ORDER — LEVONORGESTREL AND ETHINYL ESTRADIOL 6-5-10
1 KIT ORAL DAILY
Status: ON HOLD | COMMUNITY
End: 2022-09-21 | Stop reason: HOSPADM

## 2022-09-14 RX ORDER — FENTANYL CITRATE 50 UG/ML
INJECTION, SOLUTION INTRAMUSCULAR; INTRAVENOUS
Status: DISCONTINUED | OUTPATIENT
Start: 2022-09-14 | End: 2022-09-15

## 2022-09-14 RX ORDER — HYDRALAZINE HYDROCHLORIDE 50 MG/1
50 TABLET, FILM COATED ORAL 3 TIMES DAILY
Status: CANCELLED | OUTPATIENT
Start: 2022-09-14

## 2022-09-14 RX ORDER — ALLOPURINOL 100 MG/1
100 TABLET ORAL DAILY
COMMUNITY

## 2022-09-14 RX ORDER — PROCHLORPERAZINE EDISYLATE 5 MG/ML
5 INJECTION INTRAMUSCULAR; INTRAVENOUS EVERY 6 HOURS PRN
Status: DISCONTINUED | OUTPATIENT
Start: 2022-09-14 | End: 2022-09-21 | Stop reason: HOSPADM

## 2022-09-14 RX ADMIN — ONDANSETRON 4 MG: 2 INJECTION INTRAMUSCULAR; INTRAVENOUS at 11:09

## 2022-09-14 RX ADMIN — RIVAROXABAN 15 MG: 15 TABLET, FILM COATED ORAL at 09:09

## 2022-09-14 RX ADMIN — ONDANSETRON 4 MG: 2 INJECTION INTRAMUSCULAR; INTRAVENOUS at 06:09

## 2022-09-14 RX ADMIN — HEPARIN SODIUM 24 UNITS/KG/HR: 10000 INJECTION, SOLUTION INTRAVENOUS at 11:09

## 2022-09-14 RX ADMIN — SODIUM CHLORIDE: 9 INJECTION, SOLUTION INTRAVENOUS at 09:09

## 2022-09-14 NOTE — ED NOTES
Pt transfer from  Ortho for PE, elevated trop, Currently has heparin running @ 18U kg/hr, denies CP, SOB. GCS-15

## 2022-09-14 NOTE — H&P
[facility] Hospital Medicine History & Physical Examination       Patient Name: Nahed Harvey  MRN: [id number]  Patient Class: IP- Inpatient   Admission Date: 9/13/2022   Admitting Physician: Nusrat Kirk MD   Length of Stay: 0  Attending Physician: JAIR Treadwell MD  Primary Care Provider: Ford Saba MD  Face-to-Face encounter date: 09/14/2022  Code Status: Full Code  Chief Complaint: Fatigue (Pt c/o of vomitting and weakness, hx of gastroparesis x few days)        Patient information was obtained from patient, patient's family, past medical records and ER records.     HISTORY OF PRESENT ILLNESS:   Nahed Harvey is a 46 y.o. White female with a past medical history of hypertension, gastroparesis and kidney donor status post left nephrectomy. The patient presented to [facility] on 9/13/2022 with a primary complaint of epigastric abdominal pain with associated nausea, dry heaving and fatigue. She reports over the last week having right lower back pain with deep inspiration. On 9/11/2022 she was going to the restroom began experiencing tunnel vision and a near syncopal episode.  She reports sliding to the floor but was unable to get up due to weakness. Patient also complains of intermittent chest tightness, shortness of breath with exertion and pain behind the right knee which began today (09/14/2022) and chronic diarrhea. She is a former smoker who quit approximately 10 years ago. She is currently on birth control. She denies recent travel, history of prior blood clots/pulmonary embolism, history of cancer and history of clotting disorder in her family. Her mom who is in her 70s recently was diagnosed with a pulmonary embolism.  Patient reports experiencing migraine headaches approximately 25 years ago which an MRI of the brain was performed revealed a meningioma.  Repeat imaging was perform 5 years later and meningioma was stable.  Her nephrologist is Dr. Saba ( has unilateral kidney, she  donated her kidney to a family member in 2008 )    Upon presentation to the ED, temperature 99.5° F, heart rate 123, blood pressure 150/160 and SpO2 97% on room air.  POC ABG with pH 7.44, pCO2 18.5, PO2 77, pHCO3 12.7. Labs with H&H 10.7/30, , CO2 17, BUN/creatinine 15.1/1.16 (9.4/0.95 on 06/09/2022), AST 79, , BNP 1419, initial troponin 0.285 with repeat decreased in the 0.210.  UDS positive for cannabis.  Influenza a and B and SARS-CoV-2 PCR negative.  UA with trace bacteria, 6 WBC, 2+ bilirubin, 1+ occult blood, trace ketones and trace protein.  Initial EKG sinus tachycardia with a ventricular rate of 117 and nonspecific ST abnormalities.  Repeat EKG with sinus tachycardia, anterior infarct and T-wave abnormalities consider inferolateral ischemia.  Chest x-ray with blunting of the left costophrenic sulcus related to trace effusion or atelectasis. CT of the head with 2.8 cm dense lesion along the right frontal lobe without mass effect or surrounding edema, malignancy unable to be excluded however findings may represent meningioma further evaluation recommended with MRI. CT chest, abdomen and pelvis with contrast revealed large bilateral PE with evidence of right heart strain, heterogenicity throughout the liver with focal hepatic nodule for which malignancy is unable to be excluded, hepatitis may be present and gallbladder thickening related to hepatic dysfunction however acute cholecystitis unable to be excluded.  V/Q scan revealed normal ventilation and perfusion.  While in ED patient was started on heparin drip and Cardiology was consulted.  Patient admitted to hospital medicine services for further medical management.    PAST MEDICAL HISTORY:   Hypertension   Gastroparesis    PAST SURGICAL HISTORY:   Left nephrectomy    ALLERGIES:   Patient has no known allergies.    FAMILY HISTORY:   Mother:  Pulmonary embolism, asthma  Father:  Cirrhosis, abdominal aortic aneurysm    SOCIAL HISTORY:   Tobacco  - Former Smoker; Quit 10 years ago  Alcohol - Rarely  Illicit Drugs - Denies    HOME MEDICATIONS:   As documented      REVIEW OF SYSTEMS:   Except as documented, all other systems reviewed and negative     PHYSICAL EXAM:     VITAL SIGNS: 24 HRS MIN & MAX LAST   Temp  Min: 98.2 °F (36.8 °C)  Max: 99.5 °F (37.5 °C) 98.2 °F (36.8 °C)   BP  Min: 106/90  Max: 150/116 (!) 117/91     Pulse  Min: 106  Max: 123  (!) 112     Resp  Min: 14  Max: 39 (!) 21     SpO2  Min: 94 %  Max: 98 % 95 %       General appearance: Well-developed, well-nourished female in no apparent distress.  No family at bedside.  HEENT: Atraumatic head. Moist mucous membranes of oral cavity.  On room air.  When did with short sentences.  Lungs: Clear to auscultation bilaterally.   Heart:  Tachycardic rate and rhythm.  No lower extremity edema.  Abdomen: Soft, non-distended, tenderness to upper abdominal quadrants. Bowel sounds are normal.   Extremities: No cyanosis, clubbing. No deformities.  Skin: No Rash. Warm and dry.  Neuro: Awake, alert and oriented. Motor and sensory exams grossly intact.  Psych/mental status: Appropriate mood and affect. Cooperative. Responds appropriately to questions.       LABS AND IMAGING:     Recent Labs   Lab 09/13/22 1618 09/14/22  0007 09/14/22  0329   WBC 10.2 10.8 10.8   RBC 2.46* 2.38* 2.17*   HGB 10.7* 9.9* 9.7*   HCT 30.0* 29.1* 26.6*   .0* 122.3* 122.6*   MCH 43.5* 41.6* 44.7*   MCHC 35.7 34.0 36.5*   RDW 15.0 15.2 15.0    276 266   MPV 10.4 10.5* 10.6*       Recent Labs   Lab 09/13/22 1619 09/13/22 1717 09/14/22  0329     --  135*   K 4.6  --  4.4   CO2 17*  --  14*   BUN 15.1  --  14.0   CREATININE 1.16*  --  1.03*   CALCIUM 9.0  --  8.4   PH  --  7.444  --    MG 2.10  --  2.00   ALBUMIN 3.0*  --  3.0*   ALKPHOS 91  --  97   *  --  142*   AST 79*  --  125*   BILITOT 0.3  --  0.4       Microbiology Results (last 7 days)       Procedure Component Value Units Date/Time    Blood Culture #1  **CANNOT BE ORDERED STAT** [[id number]] Collected: 09/13/22 1715    Order Status: Sent Specimen: Blood from Arm, Right Updated: 09/13/22 1723    Blood Culture #2 **CANNOT BE ORDERED STAT** [[id number]] Collected: 09/13/22 1723    Order Status: Sent Specimen: Blood from Forearm, Right Updated: 09/13/22 1723             NM Lung Scan Ventilation Perfusion  Narrative: EXAMINATION:  NM LUNG VENTILATION AND PERFUSION IMAGING    CLINICAL HISTORY:  Pulmonary embolism (PE) suspected, high prob;    TECHNIQUE:  Ventilation and perfusion scans were performed in multiple projections. 36.8 mCi of Technetium-99m Pyrophosphate aerosol was used for ventilation scan and 5.4 mCi of Technitium-99m MAA was administered intravenously for the perfusion scan.    COMPARISON:  Chest radiograph September 13, 2022    FINDINGS:  Ventilation images show unremarkable pyrophosphate distribution within bilateral lungs and there is no central trapping.    Perfusion images also show unremarkable MAA activity within bilateral lungs.  There are no unmatched segmental or subsegmental perfusion defects compared to the ventilation images.  Impression: Normal ventilation perfusion scan.    Electronically signed by: Hilton Lindsey  Date:    09/14/2022  Time:    08:51        ASSESSMENT & PLAN:   Assessment:  Bilateral pulmonary embolism with right heart strain  Right frontal lobe lesion, has h/o meningeoma   Hepatic nodule ? Malignancy  Suspected acute cholecystitis  Macrocytic anemia  Acute kidney injury   Transaminitis  Essential hypertension  Gastroparesis     Plan:  - Continue with heparin drip for anticoagulation  - Echo results pending  - Cardiology consulted. Appreciate recommendations  - MRI of the brain with contrast ordered for further evaluation of frontal lobe lesion  - Abdominal US ordered   - Alpha fetal protein ordered  - Will also order ultrasound bilateral lower extremities to rule out DVT  - IV hydralazine as needed for hypertension  - Resume  appropriate home medications   - Labs in AM    VTE Prophylaxis: will be placed on Heparin for DVT prophylaxis and will be advised to be as mobile as possible and sit in a chair as tolerated      __________________________________________________________________________  INPATIENT LIST OF MEDICATIONS     Current Facility-Administered Medications:     acetaminophen tablet 650 mg, 650 mg, Oral, Q6H PRN, Corinne Nielsen AGACNP-BC    aluminum-magnesium hydroxide-simethicone 200-200-20 mg/5 mL suspension 30 mL, 30 mL, Oral, QID PRN, oCrinne Nielsen AGACNP-BC    heparin 25,000 units in dextrose 5% (100 units/ml) IV bolus from bag - ADDITIONAL PRN BOLUS - 30 units/kg, 30 Units/kg (Adjusted), Intravenous, PRN, Piyush Reynolds MD    heparin 25,000 units in dextrose 5% (100 units/ml) IV bolus from bag - ADDITIONAL PRN BOLUS - 60 units/kg, 60 Units/kg (Adjusted), Intravenous, PRN, Piyush Reynolds MD, 3,762 Units at 09/14/22 0334    heparin 25,000 units in dextrose 5% 250 mL (100 units/mL) infusion HIGH INTENSITY nomogram - LAF, 18 Units/kg/hr (Adjusted), Intravenous, Continuous, Piyush Reynolds MD, Last Rate: 13.2 mL/hr at 09/14/22 0335, 21 Units/kg/hr at 09/14/22 0335    melatonin tablet 6 mg, 6 mg, Oral, Nightly PRN, LUIS PradoP-BC    naloxone 0.4 mg/mL injection 0.02 mg, 0.02 mg, Intravenous, PRN, Corinne Nielsen AGACNP-BC    ondansetron injection 4 mg, 4 mg, Intravenous, Q4H PRN, Corinne Nielsen AGACNP-BC    polyethylene glycol packet 17 g, 17 g, Oral, Daily, Corinne G Morales, AGACNP-BC    prochlorperazine injection Soln 5 mg, 5 mg, Intravenous, Q6H PRN, LUIS PradoP-BC    simethicone chewable tablet 80 mg, 1 tablet, Oral, QID PRN, LUIS PradoP-BC  No current outpatient medications on file.      Scheduled Meds:   polyethylene glycol  17 g Oral Daily     Continuous Infusions:   heparin (porcine) in D5W 21 Units/kg/hr (09/14/22 0335)     PRN Meds:.acetaminophen, aluminum-magnesium  hydroxide-simethicone, heparin (PORCINE), heparin (PORCINE), melatonin, naloxone, ondansetron, prochlorperazine, simethicone      Discharge Planning and Disposition: Anticipated discharge to be determined.    I, DEMETRIUS Hopkins, have reviewed and discussed the case with Dr. JAIR Treadwell.    Please see the following addendum for further assessment and plan from there attending MD.    Hawa Post PA-C  09/14/2022    ________________________________________________________________________________    MD Addendum:  I, Dr. JAIR Treadwell, assumed care of this patient today at 12  For the patient encounter, I performed the substantive portion of the visit, I reviewed the NP/PA documentation, treatment plan, and medical decision making.  I had face to face time with this patient     A. History:  Patient came in with chest pain and SOB.   Past 3-4 weeks she has had GI issues with nausea, vomiting and dyspepsia  Mostly been in her couch   Denies any fever, chills, dysuria or hematuria   B. Physical exam:  Heart Tachycardic, regular rhythm   Lungs clear   Abdomen soft and non tender   No FND   C. Medical decision making:  Patients labs, vitals and CT chest results reviewed  She does have jose alejandro large PE and is SOB at rest   ABG consistent with respiratory alkalosis   CT chest also showed right heart strain   Denies any chest pain now  Will keep on bedrest and tele monitoring   Cont iv heparin for now   Risk factors for PE: + use of birth control pills, + mother had PE  Denies any h/o cancer, lupus, miscarriage or recent tobacco use    She has been having nausea, vomiting and dyspepsia past 3-4 weeks. She has been most stilting on the couch and not moving much. Will check US legs to r/o DVT  Her CT abdomen also showed possible cholecystitis  Will get US liver and GB today. May need HIDA scan   Liver nodule was also seen in CT, will get AFP    Also CT bran was done and showed a right frontal lobe lesion.   Patient states she  has h/o migraine and so has had MRI brain done in the past and was diagnosed with a meningioma. Rpt MRI brain was done and she was reassured by her PCP that there was changes.   MRI brain ordered this admit     Cont supportive care     D/W cardiology team. If malignancy is ruled out and if patient is still symptomatic at rest then we should consider thrombectomy or thrombolysis of clot.     Condition: Guarded       Critical care note:  Critical care diagnosis: Extensive PE needing iv heparin   Critical care interventions: Hands-on evaluation, review of labs/radiographs/records and discussion with patient and family if present  Critical care time spent: 45 minutes       All diagnosis and differential diagnosis have been reviewed; assessment and plan has been documented; I have personally reviewed the labs and test results that are presently available; I have reviewed the patients medication list; I have reviewed the consulting providers response and recommendations. I have reviewed or attempted to review medical records based upon their availability.    All of the patient and family questions have been addressed and answered. Patient's is agreeable to the above stated plan. I will continue to monitor closely and make adjustments to medical management as needed.     JAIR Treadwell MD  09/14/2022         US abdomen + for acute cholecystitis. Will need surgery consult when respiratory status is more stable.

## 2022-09-14 NOTE — PLAN OF CARE
09/14/22 1439   Discharge Assessment   Assessment Type Discharge Planning Assessment   Confirmed/corrected address, phone number and insurance Yes   Confirmed Demographics Correct on Facesheet   Source of Information patient   When was your last doctors appointment?   (Patient reports June 2022.)   Communicated BREANNA with patient/caregiver Yes   Reason For Admission Bilateral pulmonary Embolism   Lives With spouse   Do you expect to return to your current living situation? Yes   Do you have help at home or someone to help you manage your care at home? Yes   Who are your caregiver(s) and their phone number(s)? Spouse: Raphael Doise: [phone]   Prior to hospitilization cognitive status: Unable to Assess   Current cognitive status: Alert/Oriented   Walking or Climbing Stairs Difficulty none   Dressing/Bathing Difficulty none   Home Layout Able to live on 1st floor   Equipment Currently Used at Home none   Readmission within 30 days? No   Do you currently have service(s) that help you manage your care at home? No   Do you take prescription medications? Yes   Do you have prescription coverage? Yes   Do you have any problems affording any of your prescribed medications? No   Is the patient taking medications as prescribed? yes   Who is going to help you get home at discharge? Spouse   How do you get to doctors appointments? car, drives self   Are you on dialysis? No   Do you take coumadin? No   Discharge Plan A Home with family   Discharge Plan B Home   DME Needed Upon Discharge  none   Discharge Plan discussed with: Patient   Relationship/Environment   Name(s) of Who Lives With Patient Spouse.

## 2022-09-14 NOTE — CONSULTS
Inpatient consult to Cardiology  [facility]  Consult ordered by: GRICEL Vanessa  Reason for consult: bilateral PE      [facility] - Emergency Dept  Cardiology  Consult Note    Patient Name: Nahed Harvey  MRN: [id number]  Admission Date: 9/13/2022  Hospital Length of Stay: 0 days  Code Status: Full Code   Attending Provider: Nusrat Kirk MD   Consulting Provider: STEVE Cifuentes  Primary Care Physician: Ford Saba MD  Principal Problem:<principal problem not specified>    Patient information was obtained from patient, past medical records, and ER records.     Subjective:     Chief Complaint:  Consulted for bilateral PE     HPI:   This is a 46-year-old female, who is unknown to [facility], with history of gastroparesis and hypertension.  She presented to [facility] on 09/13/2022 with complaints of weakness.  She reported feeling weak and having chest pain that started approximately 3 weeks prior to arrival with associated shortness of breath, nausea, sharp radiating pain in her back with deep breathing, and changes in her eating habits.  She reported that the symptoms subside when she lies flat on her back.  BNP was 1419.4.  Troponin was mildly elevated with a max of 0.291.  CTA of the chest revealed bilateral pulmonary embolism.  V/Q scan was normal.  [facility] has been consulted for bilateral PE.    PMH:  Gastroparesis, HTN  PSH:  Nephrectomy  Social History:  UDS positive for cannabis, denies EtOH and tobacco use  Family History:  Noncontributory    Previous Cardiac Diagnostics:   No previous diagnostics for review      Review of patient's allergies indicates:  No Known Allergies    No current facility-administered medications on file prior to encounter.     No current outpatient medications on file prior to encounter.       Review of Systems:  Review of Systems   Constitutional:  Positive for fatigue.   HENT: Negative.     Eyes: Negative.    Respiratory:   Positive for shortness of breath.    Cardiovascular:  Positive for chest pain.   Gastrointestinal:  Positive for nausea.   Endocrine: Negative.    Genitourinary: Negative.    Musculoskeletal:  Positive for back pain.   Skin: Negative.    Allergic/Immunologic: Negative.    Neurological: Negative.    Hematological: Negative.    Psychiatric/Behavioral: Negative.       Objective:     Vital Signs (Most Recent):  Temp: 98.2 °F (36.8 °C) (09/13/22 2210)  Pulse: 108 (09/14/22 0703)  Resp: 18 (09/14/22 0703)  BP: (!) 128/103 (09/14/22 0703)  SpO2: 95 % (09/14/22 0703)   Vital Signs (24h Range):  Temp:  [98.2 °F (36.8 °C)-99.5 °F (37.5 °C)] 98.2 °F (36.8 °C)  Pulse:  [106-123] 108  Resp:  [14-39] 18  SpO2:  [95 %-98 %] 95 %  BP: (106-150)/() 128/103     Weight: 74.8 kg (165 lb)  Body mass index is 28.32 kg/m².    SpO2: 95 %  O2 Device (Oxygen Therapy): room air      Intake/Output Summary (Last 24 hours) at 9/14/2022 0852  Last data filed at 9/13/2022 1905  Gross per 24 hour   Intake 1 ml   Output --   Net 1 ml       Lines/Drains/Airways       Peripheral Intravenous Line  Duration                  Peripheral IV - Single Lumen 09/13/22 1536 20 G Posterior;Right Hand <1 day         Peripheral IV - Single Lumen 09/13/22 1712 20 G Right Antecubital <1 day                      Significant Labs: CMP   Recent Labs   Lab 09/13/22  1619 09/14/22  0329    135*   K 4.6 4.4   CO2 17* 14*   BUN 15.1 14.0   CREATININE 1.16* 1.03*   CALCIUM 9.0 8.4   ALBUMIN 3.0* 3.0*   BILITOT 0.3 0.4   ALKPHOS 91 97   AST 79* 125*   * 142*         Significant Imaging:   Imaging Results              NM Lung Scan Ventilation Perfusion (In process)  Result time 09/14/22 08:51:41                     CT Head Without Contrast (Final result)  Result time 09/13/22 20:18:33      Final result by Irvin Hanley MD (09/13/22 20:18:33)                   Impression:      2.8 cm hyperdense lesion along the right frontal lobe.  There is no gross mass  effect or surrounding edema.  Malignancy is not excluded, however findings may represent meningioma.  Further evaluation may be obtained with MR.      Electronically signed by: Irvin Hanley  Date:    09/13/2022  Time:    20:18               Narrative:    EXAMINATION:  CT HEAD WITHOUT CONTRAST    CLINICAL HISTORY:  possible new cancer, potential thrombolytic candidate;    TECHNIQUE:  Low dose axial images were obtained through the head.  Coronal and sagittal reformations were also performed. Contrast was not administered.    Automatic exposure control was utilized to reduce the patient's radiation dose.    DLP= 811    COMPARISON:  None.    FINDINGS:  2.8 cm hyperdense lesion along the right frontal lobe.  There is no gross mass effect or surrounding edema.    The osseous structures are normal.    The mastoid air cells are clear.    The auditory canals are patent bilaterally.    The globes and orbital contents are normal bilaterally.    The visualized maxillary, ethmoid and sphenoid sinuses are clear.                                       CT Chest Abdomen Pelvis With Contrast (xpd) (Final result)  Result time 09/13/22 19:15:07   Procedure changed from CT Abdomen Pelvis With Contrast     Final result by Irvin Hanley MD (09/13/22 19:15:07)                   Impression:      Findings as above with large bilateral PE and evidence of right heart strain.  There is heterogeneity throughout the liver with a focal a patting nodule for which malignancy is not excluded.  Hepatitis may be present.  The gallbladder thickening may be related to hepatic dysfunction, however acute cholecystitis is not entirely excluded.    Findings reported to the ER physician prior to interpretation.      Electronically signed by: Irvin Hanley  Date:    09/13/2022  Time:    19:15               Narrative:    EXAMINATION:  CT CHEST ABDOMEN PELVIS WITH CONTRAST (XPD)    CLINICAL HISTORY:  Abdominal pain, acute,  nonlocalized;    TECHNIQUE:  Axial images of the chest, abdomen, and pelvis were obtained With Contrast. Sagittal and coronal reconstructed images were available for review.    Automatic exposure control was utilized to reduce the patient's radiation dose.    DLP = 631    COMPARISON:  2007    FINDINGS:  Bilateral pleural effusions with large mainstem pulmonary thromboembolism extending both superiorly and inferiorly bilaterally.  There are peripheral likely infarct within the right middle lobe.  Recommend follow-up to resolution with CT of the chest in 3 months.  The liver is heterogeneous with right-sided nodule measuring approximately 2.3 cm of unknown etiology.  Recommend further evaluation on a nonemergent basis with contrast enhanced MRI or triple phase liver scan.  The gallbladder wall appears thickened which may be related to acute hepatitis, however cholecystitis is not entirely excluded.  Further evaluation may be obtained with ultrasound or nuclear medicine scan.  There is 8 pelvic mass within the right hemipelvis, however this appears similar to CT from 2007.  This possibly represents benign uterine fibroid which is pedunculated.  The left kidney is absent.                                       X-Ray Chest AP Portable (Final result)  Result time 09/13/22 17:30:38      Final result by Amairani Reyes MD (09/13/22 17:30:38)                   Impression:      Blunting of the left costophrenic sulcus may be related to trace effusion or atelectasis.      Electronically signed by: Amairani Reyes  Date:    09/13/2022  Time:    17:30               Narrative:    EXAMINATION:  XR CHEST AP PORTABLE    CLINICAL HISTORY:  Chest pain, unspecified    TECHNIQUE:  Single frontal view of the chest was performed.    COMPARISON:  02/04/2008    FINDINGS:  LINES AND TUBES: EKG/telemetry leads overlie the chest.    MEDIASTINUM AND AGUSTÍN: Cardiac silhouette is at the upper limits of normal.    LUNGS: No lobar  consolidation. No edema.    PLEURA:There is blunting of the left costophrenic sulcus.No pneumothorax.    BONES: No acute osseous abnormality.                                        EKG:        Telemetry:  ST    Physical Exam:  Physical Exam  Constitutional:       Appearance: Normal appearance.   HENT:      Head: Atraumatic.   Eyes:      Extraocular Movements: Extraocular movements intact.      Conjunctiva/sclera: Conjunctivae normal.   Cardiovascular:      Rate and Rhythm: Regular rhythm. Tachycardia present.      Pulses: Normal pulses.      Heart sounds: Normal heart sounds.   Pulmonary:      Effort: Pulmonary effort is normal.      Breath sounds: Normal breath sounds.   Abdominal:      Palpations: Abdomen is soft.   Musculoskeletal:         General: Normal range of motion.      Cervical back: Neck supple.   Skin:     General: Skin is warm and dry.   Neurological:      Mental Status: She is alert and oriented to person, place, and time.   Psychiatric:         Mood and Affect: Mood normal.         Behavior: Behavior normal.       Home Medications:   No current facility-administered medications on file prior to encounter.     No current outpatient medications on file prior to encounter.       Current Inpatient Medications:    Current Facility-Administered Medications:     acetaminophen tablet 650 mg, 650 mg, Oral, Q6H PRN, STEVE Prado    aluminum-magnesium hydroxide-simethicone 200-200-20 mg/5 mL suspension 30 mL, 30 mL, Oral, QID PRN, STEVE Prado    heparin 25,000 units in dextrose 5% (100 units/ml) IV bolus from bag - ADDITIONAL PRN BOLUS - 30 units/kg, 30 Units/kg (Adjusted), Intravenous, PRN, Piyush Reynolds MD    heparin 25,000 units in dextrose 5% (100 units/ml) IV bolus from bag - ADDITIONAL PRN BOLUS - 60 units/kg, 60 Units/kg (Adjusted), Intravenous, PRN, Piyush Reynolds MD, 3,762 Units at 09/14/22 0334    heparin 25,000 units in dextrose 5% 250 mL (100 units/mL) infusion  HIGH INTENSITY nomogram - LAF, 18 Units/kg/hr (Adjusted), Intravenous, Continuous, Piyush Reynolds MD, Last Rate: 13.2 mL/hr at 09/14/22 0335, 21 Units/kg/hr at 09/14/22 0335    melatonin tablet 6 mg, 6 mg, Oral, Nightly PRN, Corinne Nielsen, AGACNP-BC    naloxone 0.4 mg/mL injection 0.02 mg, 0.02 mg, Intravenous, PRN, Corinne Nielsen, AGACNP-BC    ondansetron injection 4 mg, 4 mg, Intravenous, Q4H PRN, Corinne MALACHI Nielsen, AGACNP-BC    polyethylene glycol packet 17 g, 17 g, Oral, Daily, Corinne Nielsen, AGACNP-BC    prochlorperazine injection Soln 5 mg, 5 mg, Intravenous, Q6H PRN, Corinne MALACHI Nielsen, AGACNP-BC    simethicone chewable tablet 80 mg, 1 tablet, Oral, QID PRN, Corinne Nielsen, AGACNP-BC  No current outpatient medications on file.           Assessment:     IMPRESSION:  Bilateral Pulmonary embolism   -ON RA with O2 sats >92%  Right frontal lobe mass  Liver Nodule  Gastroparesis  HTN  Anemia        PLAN:     PLAN:  Continue heparin gtt for now  Case reviewed by Dr. Victoria. Will proceed with pulmonary angiogram with mechanical thrombectomy.  Risk, Benefits and Alternatives Reviewed and Discussed with the PT and their Family and they wish to proceed with above Procedure.  Recommend Xarelto 15mg BID x 21 days then 20mg daily upon DC  Work-up of liver nodule and right frontal lobe mass per primary.    Thank you for your consult.     Wilmar Diaz, ISABELLCNP-BC  Cardiology  [facility] - Emergency Dept  09/14/2022 8:52 AM

## 2022-09-14 NOTE — ED PROVIDER NOTES
Encounter Date: 9/13/2022    SCRIBE #1 NOTE: I, Brian Cartagena, am scribing for, and in the presence of,  Piyush Reynolds MD. I have scribed the following portions of the note - Other sections scribed: hpi, ros, pe.     History     Chief Complaint   Patient presents with    Fatigue     Pt c/o of vomitting and weakness, hx of gastroparesis x few days     45 y/o female with history of Gastroparesis and HTN presenting to the ED complaining of weakness. Patient reports feeling weak and LOC this morning. She also complains of CP onset 3 weeks associated with SOB, nausea, sharp radiating pain in her back with deep breathing, and changes in her eating habits.  She also reports symptoms subsides when she lies flat on her back.    The history is provided by the patient. No  was used.   Fatigue  This is a new problem. The current episode started 6 to 12 hours ago. The problem occurs constantly. Associated symptoms include chest pain and shortness of breath. Pertinent negatives include no abdominal pain.   Review of patient's allergies indicates:  No Known Allergies  Past Medical History:   Diagnosis Date    Gastroparesis     Hypertension      Past Surgical History:   Procedure Laterality Date    NEPHRECTOMY       No family history on file.     Review of Systems   Constitutional:  Positive for fatigue. Negative for chills and fever.   HENT:  Negative for sinus pain.    Respiratory:  Positive for shortness of breath. Negative for cough and chest tightness.    Cardiovascular:  Positive for chest pain.   Gastrointestinal:  Positive for nausea. Negative for abdominal pain, diarrhea and vomiting.   Genitourinary:  Negative for dysuria and hematuria.   Musculoskeletal:  Positive for back pain (Sharp).   Skin:  Negative for rash.   Neurological:  Negative for syncope and weakness.   All other systems reviewed and are negative.    Physical Exam     Initial Vitals   BP Pulse Resp Temp SpO2   09/13/22 1533  09/13/22 1533 09/13/22 1533 09/13/22 1530 09/13/22 1533   (!) 150/116 (!) 123 20 99.5 °F (37.5 °C) 97 %      MAP       --                Physical Exam    Nursing note and vitals reviewed.  Constitutional: She appears well-developed and well-nourished. No distress.   HENT:   Head: Normocephalic and atraumatic.   Eyes: Conjunctivae are normal.   Cardiovascular:  Intact distal pulses.   Tachycardia present.         Pulmonary/Chest: No respiratory distress. She has no rhonchi.   Abdominal: Abdomen is soft. Bowel sounds are normal. There is no abdominal tenderness. There is no rebound and no guarding.   Musculoskeletal:         General: No edema.     Neurological: She is alert. She has normal strength.   Skin: Skin is warm and dry.   Psychiatric: She has a normal mood and affect.       ED Course   Procedures  Labs Reviewed   COMPREHENSIVE METABOLIC PANEL - Abnormal; Notable for the following components:       Result Value    Chloride 108 (*)     Carbon Dioxide 17 (*)     Glucose Level 125 (*)     Creatinine 1.16 (*)     Albumin Level 3.0 (*)     Globulin 3.9 (*)     Albumin/Globulin Ratio 0.8 (*)     Alanine Aminotransferase 112 (*)     Aspartate Aminotransferase 79 (*)     All other components within normal limits   CBC WITH DIFFERENTIAL - Abnormal; Notable for the following components:    RBC 2.46 (*)     Hgb 10.7 (*)     Hct 30.0 (*)     .0 (*)     MCH 43.5 (*)     IG# 0.05 (*)     All other components within normal limits   TROPONIN I - Abnormal; Notable for the following components:    Troponin-I 0.291 (*)     All other components within normal limits   BLOOD SMEAR MICROSCOPIC EXAM ([facility]) - Abnormal; Notable for the following components:    RBC Morph Abnormal (*)     Anisocyte 1+ (*)     Macrocyte 2+ (*)     All other components within normal limits   BETA - HYDROXYBUTYRATE, SERUM - Abnormal; Notable for the following components:    Beta Hydroxybutyrate 2.60 (*)     All other components within normal limits    PROTIME-INR - Abnormal; Notable for the following components:    INR 1.08 (*)     All other components within normal limits   TROPONIN I - Abnormal; Notable for the following components:    Troponin-I 0.285 (*)     All other components within normal limits   ISTAT PROCEDURE - Abnormal; Notable for the following components:    POC PCO2 18.5 (*)     POC PO2 77 (*)     POC HCO3 12.7 (*)     POC TCO2 13 (*)     All other components within normal limits   COVID/FLU A&B PCR - Normal   LIPASE - Normal   MAGNESIUM - Normal   LACTIC ACID, PLASMA - Normal   APTT - Normal   BLOOD CULTURE [facility]   BLOOD CULTURE [facility]   CBC W/ AUTO DIFFERENTIAL    Narrative:     The following orders were created for panel order CBC auto differential.  Procedure                               Abnormality         Status                     ---------                               -----------         ------                     CBC with Differential[[id number]]        Abnormal            Final result                 Please view results for these tests on the individual orders.   URINALYSIS, REFLEX TO URINE CULTURE   PREGNANCY TEST, URINE RAPID   DRUG SCREEN, URINE (BEAKER)   LACTIC ACID, PLASMA   CBC W/ AUTO DIFFERENTIAL    Narrative:     The following orders were created for panel order CBC auto differential.  Procedure                               Abnormality         Status                     ---------                               -----------         ------                     CBC with Differential[[id number]]                            In process                   Please view results for these tests on the individual orders.   CBC WITH DIFFERENTIAL   B-TYPE NATRIURETIC PEPTIDE        ECG Results              EKG 12-lead (Final result)  Result time 09/13/22 16:13:32      Final result by Interface, Lab In [facility] (09/13/22 16:13:32)                   Narrative:    Test Reason : R53.1,    Vent. Rate : 117 BPM     Atrial Rate : 117 BPM     P-R Int : 128  ms          QRS Dur : 080 ms      QT Int : 344 ms       P-R-T Axes : 041 079 090 degrees     QTc Int : 479 ms    Sinus tachycardia  Nonspecific ST abnormality  Abnormal ECG    Confirmed by Vargas Mercado MD ([id number]) on 9/13/2022 4:13:23 PM    Referred By: JOHN   SELF           Confirmed By:Vargas Mercado MD                                  Imaging Results              CT Head Without Contrast (Final result)  Result time 09/13/22 20:18:33      Final result by Irvin Hanley MD (09/13/22 20:18:33)                   Impression:      2.8 cm hyperdense lesion along the right frontal lobe.  There is no gross mass effect or surrounding edema.  Malignancy is not excluded, however findings may represent meningioma.  Further evaluation may be obtained with MR.      Electronically signed by: Irvin Hanley  Date:    09/13/2022  Time:    20:18               Narrative:    EXAMINATION:  CT HEAD WITHOUT CONTRAST    CLINICAL HISTORY:  possible new cancer, potential thrombolytic candidate;    TECHNIQUE:  Low dose axial images were obtained through the head.  Coronal and sagittal reformations were also performed. Contrast was not administered.    Automatic exposure control was utilized to reduce the patient's radiation dose.    DLP= 811    COMPARISON:  None.    FINDINGS:  2.8 cm hyperdense lesion along the right frontal lobe.  There is no gross mass effect or surrounding edema.    The osseous structures are normal.    The mastoid air cells are clear.    The auditory canals are patent bilaterally.    The globes and orbital contents are normal bilaterally.    The visualized maxillary, ethmoid and sphenoid sinuses are clear.                                       CT Chest Abdomen Pelvis With Contrast (xpd) (Final result)  Result time 09/13/22 19:15:07   Procedure changed from CT Abdomen Pelvis With Contrast     Final result by Irvin Hanley MD (09/13/22 19:15:07)                   Impression:      Findings as above with  large bilateral PE and evidence of right heart strain.  There is heterogeneity throughout the liver with a focal a patting nodule for which malignancy is not excluded.  Hepatitis may be present.  The gallbladder thickening may be related to hepatic dysfunction, however acute cholecystitis is not entirely excluded.    Findings reported to the ER physician prior to interpretation.      Electronically signed by: Irvin Hanley  Date:    09/13/2022  Time:    19:15               Narrative:    EXAMINATION:  CT CHEST ABDOMEN PELVIS WITH CONTRAST (XPD)    CLINICAL HISTORY:  Abdominal pain, acute, nonlocalized;    TECHNIQUE:  Axial images of the chest, abdomen, and pelvis were obtained With Contrast. Sagittal and coronal reconstructed images were available for review.    Automatic exposure control was utilized to reduce the patient's radiation dose.    DLP = 631    COMPARISON:  2007    FINDINGS:  Bilateral pleural effusions with large mainstem pulmonary thromboembolism extending both superiorly and inferiorly bilaterally.  There are peripheral likely infarct within the right middle lobe.  Recommend follow-up to resolution with CT of the chest in 3 months.  The liver is heterogeneous with right-sided nodule measuring approximately 2.3 cm of unknown etiology.  Recommend further evaluation on a nonemergent basis with contrast enhanced MRI or triple phase liver scan.  The gallbladder wall appears thickened which may be related to acute hepatitis, however cholecystitis is not entirely excluded.  Further evaluation may be obtained with ultrasound or nuclear medicine scan.  There is 8 pelvic mass within the right hemipelvis, however this appears similar to CT from 2007.  This possibly represents benign uterine fibroid which is pedunculated.  The left kidney is absent.                                       X-Ray Chest AP Portable (Final result)  Result time 09/13/22 17:30:38      Final result by Amairani Reyes MD (09/13/22  17:30:38)                   Impression:      Blunting of the left costophrenic sulcus may be related to trace effusion or atelectasis.      Electronically signed by: Amairani Reyes  Date:    09/13/2022  Time:    17:30               Narrative:    EXAMINATION:  XR CHEST AP PORTABLE    CLINICAL HISTORY:  Chest pain, unspecified    TECHNIQUE:  Single frontal view of the chest was performed.    COMPARISON:  02/04/2008    FINDINGS:  LINES AND TUBES: EKG/telemetry leads overlie the chest.    MEDIASTINUM AND AGUSTÍN: Cardiac silhouette is at the upper limits of normal.    LUNGS: No lobar consolidation. No edema.    PLEURA:There is blunting of the left costophrenic sulcus.No pneumothorax.    BONES: No acute osseous abnormality.                                       Medications   heparin 25,000 units in dextrose 5% 250 mL (100 units/mL) infusion HIGH INTENSITY nomogram - LAF (18 Units/kg/hr × 62.7 kg (Adjusted) Intravenous New Bag 9/13/22 2052)   heparin 25,000 units in dextrose 5% (100 units/ml) IV bolus from bag - ADDITIONAL PRN BOLUS - 60 units/kg (has no administration in time range)   heparin 25,000 units in dextrose 5% (100 units/ml) IV bolus from bag - ADDITIONAL PRN BOLUS - 30 units/kg (has no administration in time range)   ondansetron 4 mg/2 mL injection (has no administration in time range)   sodium chloride 0.9% bolus 1,000 mL (0 mLs Intravenous Stopped 9/13/22 1712)   droperidoL injection 1.25 mg (1.25 mg Intravenous Given 9/13/22 1613)   ondansetron injection 4 mg (4 mg Intravenous Given 9/13/22 1741)   iopamidoL (ISOVUE-370) injection 100 mL (100 mLs Intravenous Given 9/13/22 1854)   lactated ringers infusion (1,000 mLs Intravenous New Bag 9/13/22 1905)   promethazine injection 25 mg (25 mg Intramuscular Given 9/13/22 1905)   heparin 25,000 units in dextrose 5% (100 units/ml) IV bolus from bag INITIAL BOLUS (5,016 Units Intravenous Bolus from Bag 9/13/22 2050)   ondansetron injection 4 mg (4 mg Intravenous Given  9/13/22 2146)     Medical Decision Making:   Differential Diagnosis:   Pulmonary embolus, MI, hypoxia, hypertensive urgency  Clinical Tests:   Lab Tests: Ordered and Reviewed  Radiological Study: Ordered and Reviewed  Medical Tests: Ordered and Reviewed  ED Management:  Transferred with known pulmonary embolus already on heparin continue drip.  Discussed with pulmonary.  Discussed with hospitalist.  Patient will be admitted continue treatment.  There is evidence of right heart strain on the CT image however patient is hemodynamically stable satting well.  ICU was contacted prior to transfer they do not feel patient required ICU so she was sent ED to ED.  I have discussed the case with medicine who will admit        No critical care time for this ED encounter        Scribe Attestation:   Scribe #1: I performed the above scribed service and the documentation accurately describes the services I performed. I attest to the accuracy of the note.    Attending Attestation:           Physician Attestation for Scribe:  Physician Attestation Statement for Scribe #1: I, reviewed documentation, as scribed by Brian Cartagena in my presence, and it is both accurate and complete.             ED Course as of 09/14/22 0022   Tue Sep 13, 2022   1727 Patient in compensated metabolic acidosis [GM]   1917 Patient was seen and evaluated by myself, Dr. Figueroa. I had face-to-face time with the patient and agree with documentation and medical decision making as documented. Any additions or corrections will be noted here.    Giana Figueroa     [GM]   Wed Sep 14, 2022   0006 Hospitalist paged  [MS]      ED Course User Index  [GM] Giana Figueroa MD  [MS] Brian Cartagena                 Clinical Impression:   Final diagnoses:  [R53.1] Weakness  [R07.9] Chest pain  [I26.99] Acute pulmonary embolism, unspecified pulmonary embolism type, unspecified whether acute cor pulmonale present (Primary)  [R11.2] Intractable vomiting with nausea,  unspecified vomiting type  [R16.0] Liver mass        ED Disposition Condition    Admit Stable                Piyush Reynolds MD  09/14/22 0022

## 2022-09-14 NOTE — ED NOTES
Assumed care for pt at this time. Pt presents to ed with complaints of intermittent CP, worsening w/ movement, and SOB x1 week. Pt also w/ complaint of n/v. Pt w/ hx of gastroparesis and HTN. Pt denies CP at this time. Visitor at bedside. Cardiac-respiratory monitors in progress

## 2022-09-14 NOTE — H&P (VIEW-ONLY)
Inpatient consult to Cardiology  Cons[facility]  Consult ordered by: GRICEL Vanessa  Reason for consult: bilateral PE      [facility] - Emergency Dept  Cardiology  Consult Note    Patient Name: Nahed Harvey  MRN: [id number]  Admission Date: 9/13/2022  Hospital Length of Stay: 0 days  Code Status: Full Code   Attending Provider: Nusrat Kirk MD   Consulting Provider: STEVE Cifuentes  Primary Care Physician: Ford Saba MD  Principal Problem:<principal problem not specified>    Patient information was obtained from patient, past medical records, and ER records.     Subjective:     Chief Complaint:  Consulted for bilateral PE     HPI:   This is a 46-year-old female, who is unknown to [facility], with history of gastroparesis and hypertension.  She presented to [facility] on 09/13/2022 with complaints of weakness.  She reported feeling weak and having chest pain that started approximately 3 weeks prior to arrival with associated shortness of breath, nausea, sharp radiating pain in her back with deep breathing, and changes in her eating habits.  She reported that the symptoms subside when she lies flat on her back.  BNP was 1419.4.  Troponin was mildly elevated with a max of 0.291.  CTA of the chest revealed bilateral pulmonary embolism.  V/Q scan was normal.  [facility] has been consulted for bilateral PE.    PMH:  Gastroparesis, HTN  PSH:  Nephrectomy  Social History:  UDS positive for cannabis, denies EtOH and tobacco use  Family History:  Noncontributory    Previous Cardiac Diagnostics:   No previous diagnostics for review      Review of patient's allergies indicates:  No Known Allergies    No current facility-administered medications on file prior to encounter.     No current outpatient medications on file prior to encounter.       Review of Systems:  Review of Systems   Constitutional:  Positive for fatigue.   HENT: Negative.     Eyes: Negative.    Respiratory:   Positive for shortness of breath.    Cardiovascular:  Positive for chest pain.   Gastrointestinal:  Positive for nausea.   Endocrine: Negative.    Genitourinary: Negative.    Musculoskeletal:  Positive for back pain.   Skin: Negative.    Allergic/Immunologic: Negative.    Neurological: Negative.    Hematological: Negative.    Psychiatric/Behavioral: Negative.       Objective:     Vital Signs (Most Recent):  Temp: 98.2 °F (36.8 °C) (09/13/22 2210)  Pulse: 108 (09/14/22 0703)  Resp: 18 (09/14/22 0703)  BP: (!) 128/103 (09/14/22 0703)  SpO2: 95 % (09/14/22 0703)   Vital Signs (24h Range):  Temp:  [98.2 °F (36.8 °C)-99.5 °F (37.5 °C)] 98.2 °F (36.8 °C)  Pulse:  [106-123] 108  Resp:  [14-39] 18  SpO2:  [95 %-98 %] 95 %  BP: (106-150)/() 128/103     Weight: 74.8 kg (165 lb)  Body mass index is 28.32 kg/m².    SpO2: 95 %  O2 Device (Oxygen Therapy): room air      Intake/Output Summary (Last 24 hours) at 9/14/2022 0852  Last data filed at 9/13/2022 1905  Gross per 24 hour   Intake 1 ml   Output --   Net 1 ml       Lines/Drains/Airways       Peripheral Intravenous Line  Duration                  Peripheral IV - Single Lumen 09/13/22 1536 20 G Posterior;Right Hand <1 day         Peripheral IV - Single Lumen 09/13/22 1712 20 G Right Antecubital <1 day                      Significant Labs: CMP   Recent Labs   Lab 09/13/22  1619 09/14/22  0329    135*   K 4.6 4.4   CO2 17* 14*   BUN 15.1 14.0   CREATININE 1.16* 1.03*   CALCIUM 9.0 8.4   ALBUMIN 3.0* 3.0*   BILITOT 0.3 0.4   ALKPHOS 91 97   AST 79* 125*   * 142*         Significant Imaging:   Imaging Results              NM Lung Scan Ventilation Perfusion (In process)  Result time 09/14/22 08:51:41                     CT Head Without Contrast (Final result)  Result time 09/13/22 20:18:33      Final result by Irvin Hanley MD (09/13/22 20:18:33)                   Impression:      2.8 cm hyperdense lesion along the right frontal lobe.  There is no gross mass  effect or surrounding edema.  Malignancy is not excluded, however findings may represent meningioma.  Further evaluation may be obtained with MR.      Electronically signed by: Irvin Hanley  Date:    09/13/2022  Time:    20:18               Narrative:    EXAMINATION:  CT HEAD WITHOUT CONTRAST    CLINICAL HISTORY:  possible new cancer, potential thrombolytic candidate;    TECHNIQUE:  Low dose axial images were obtained through the head.  Coronal and sagittal reformations were also performed. Contrast was not administered.    Automatic exposure control was utilized to reduce the patient's radiation dose.    DLP= 811    COMPARISON:  None.    FINDINGS:  2.8 cm hyperdense lesion along the right frontal lobe.  There is no gross mass effect or surrounding edema.    The osseous structures are normal.    The mastoid air cells are clear.    The auditory canals are patent bilaterally.    The globes and orbital contents are normal bilaterally.    The visualized maxillary, ethmoid and sphenoid sinuses are clear.                                       CT Chest Abdomen Pelvis With Contrast (xpd) (Final result)  Result time 09/13/22 19:15:07   Procedure changed from CT Abdomen Pelvis With Contrast     Final result by Irvin Hanley MD (09/13/22 19:15:07)                   Impression:      Findings as above with large bilateral PE and evidence of right heart strain.  There is heterogeneity throughout the liver with a focal a patting nodule for which malignancy is not excluded.  Hepatitis may be present.  The gallbladder thickening may be related to hepatic dysfunction, however acute cholecystitis is not entirely excluded.    Findings reported to the ER physician prior to interpretation.      Electronically signed by: Irvin Hanley  Date:    09/13/2022  Time:    19:15               Narrative:    EXAMINATION:  CT CHEST ABDOMEN PELVIS WITH CONTRAST (XPD)    CLINICAL HISTORY:  Abdominal pain, acute,  nonlocalized;    TECHNIQUE:  Axial images of the chest, abdomen, and pelvis were obtained With Contrast. Sagittal and coronal reconstructed images were available for review.    Automatic exposure control was utilized to reduce the patient's radiation dose.    DLP = 631    COMPARISON:  2007    FINDINGS:  Bilateral pleural effusions with large mainstem pulmonary thromboembolism extending both superiorly and inferiorly bilaterally.  There are peripheral likely infarct within the right middle lobe.  Recommend follow-up to resolution with CT of the chest in 3 months.  The liver is heterogeneous with right-sided nodule measuring approximately 2.3 cm of unknown etiology.  Recommend further evaluation on a nonemergent basis with contrast enhanced MRI or triple phase liver scan.  The gallbladder wall appears thickened which may be related to acute hepatitis, however cholecystitis is not entirely excluded.  Further evaluation may be obtained with ultrasound or nuclear medicine scan.  There is 8 pelvic mass within the right hemipelvis, however this appears similar to CT from 2007.  This possibly represents benign uterine fibroid which is pedunculated.  The left kidney is absent.                                       X-Ray Chest AP Portable (Final result)  Result time 09/13/22 17:30:38      Final result by Amairani Reyes MD (09/13/22 17:30:38)                   Impression:      Blunting of the left costophrenic sulcus may be related to trace effusion or atelectasis.      Electronically signed by: Amairani Reyes  Date:    09/13/2022  Time:    17:30               Narrative:    EXAMINATION:  XR CHEST AP PORTABLE    CLINICAL HISTORY:  Chest pain, unspecified    TECHNIQUE:  Single frontal view of the chest was performed.    COMPARISON:  02/04/2008    FINDINGS:  LINES AND TUBES: EKG/telemetry leads overlie the chest.    MEDIASTINUM AND AGUSTÍN: Cardiac silhouette is at the upper limits of normal.    LUNGS: No lobar  consolidation. No edema.    PLEURA:There is blunting of the left costophrenic sulcus.No pneumothorax.    BONES: No acute osseous abnormality.                                        EKG:        Telemetry:  ST    Physical Exam:  Physical Exam  Constitutional:       Appearance: Normal appearance.   HENT:      Head: Atraumatic.   Eyes:      Extraocular Movements: Extraocular movements intact.      Conjunctiva/sclera: Conjunctivae normal.   Cardiovascular:      Rate and Rhythm: Regular rhythm. Tachycardia present.      Pulses: Normal pulses.      Heart sounds: Normal heart sounds.   Pulmonary:      Effort: Pulmonary effort is normal.      Breath sounds: Normal breath sounds.   Abdominal:      Palpations: Abdomen is soft.   Musculoskeletal:         General: Normal range of motion.      Cervical back: Neck supple.   Skin:     General: Skin is warm and dry.   Neurological:      Mental Status: She is alert and oriented to person, place, and time.   Psychiatric:         Mood and Affect: Mood normal.         Behavior: Behavior normal.       Home Medications:   No current facility-administered medications on file prior to encounter.     No current outpatient medications on file prior to encounter.       Current Inpatient Medications:    Current Facility-Administered Medications:     acetaminophen tablet 650 mg, 650 mg, Oral, Q6H PRN, STEVE Prado    aluminum-magnesium hydroxide-simethicone 200-200-20 mg/5 mL suspension 30 mL, 30 mL, Oral, QID PRN, STEVE Prado    heparin 25,000 units in dextrose 5% (100 units/ml) IV bolus from bag - ADDITIONAL PRN BOLUS - 30 units/kg, 30 Units/kg (Adjusted), Intravenous, PRN, Piyush Reynolds MD    heparin 25,000 units in dextrose 5% (100 units/ml) IV bolus from bag - ADDITIONAL PRN BOLUS - 60 units/kg, 60 Units/kg (Adjusted), Intravenous, PRN, Piyush Reynolds MD, 3,762 Units at 09/14/22 0334    heparin 25,000 units in dextrose 5% 250 mL (100 units/mL) infusion  HIGH INTENSITY nomogram - LAF, 18 Units/kg/hr (Adjusted), Intravenous, Continuous, Piyush Reynolds MD, Last Rate: 13.2 mL/hr at 09/14/22 0335, 21 Units/kg/hr at 09/14/22 0335    melatonin tablet 6 mg, 6 mg, Oral, Nightly PRN, Corinne Nielsen, AGACNP-BC    naloxone 0.4 mg/mL injection 0.02 mg, 0.02 mg, Intravenous, PRN, Corinne Nielsen, AGACNP-BC    ondansetron injection 4 mg, 4 mg, Intravenous, Q4H PRN, Corinne MALACHI Nielsen, AGACNP-BC    polyethylene glycol packet 17 g, 17 g, Oral, Daily, Corinne Nielsen, AGACNP-BC    prochlorperazine injection Soln 5 mg, 5 mg, Intravenous, Q6H PRN, Corinne MALACHI Nielsen, AGACNP-BC    simethicone chewable tablet 80 mg, 1 tablet, Oral, QID PRN, Corinne Nielsen, AGACNP-BC  No current outpatient medications on file.           Assessment:     IMPRESSION:  Bilateral Pulmonary embolism   -ON RA with O2 sats >92%  Right frontal lobe mass  Liver Nodule  Gastroparesis  HTN  Anemia        PLAN:     PLAN:  Continue heparin gtt for now  Case reviewed by Dr. Victoria. Will proceed with pulmonary angiogram with mechanical thrombectomy.  Risk, Benefits and Alternatives Reviewed and Discussed with the PT and their Family and they wish to proceed with above Procedure.  Recommend Xarelto 15mg BID x 21 days then 20mg daily upon DC  Work-up of liver nodule and right frontal lobe mass per primary.    Thank you for your consult.     Wilmar Diaz, ISABELLCNP-BC  Cardiology  [facility] - Emergency Dept  09/14/2022 8:52 AM

## 2022-09-14 NOTE — INTERVAL H&P NOTE
The patient has been examined and the H&P has been reviewed:    I concur with the findings and no changes have occurred since H&P was written.    Procedure risks, benefits and alternative options discussed and understood by patient/family.          Active Hospital Problems    Diagnosis  POA    *Bilateral pulmonary embolism [I26.99]  Yes      Resolved Hospital Problems   No resolved problems to display.

## 2022-09-15 ENCOUNTER — APPOINTMENT (OUTPATIENT)
Dept: RADIOLOGY | Facility: HOSPITAL | Age: 47
End: 2022-09-15

## 2022-09-15 LAB
ALBUMIN SERPL-MCNC: 2.7 GM/DL (ref 3.5–5)
ALBUMIN/GLOB SERPL: 0.8 RATIO (ref 1.1–2)
ALP SERPL-CCNC: 86 UNIT/L (ref 40–150)
ALT SERPL-CCNC: 151 UNIT/L (ref 0–55)
ANISOCYTOSIS BLD QL SMEAR: ABNORMAL
AST SERPL-CCNC: 114 UNIT/L (ref 5–34)
BASOPHILS # BLD AUTO: 0.05 X10(3)/MCL (ref 0–0.2)
BASOPHILS NFR BLD AUTO: 0.5 %
BILIRUBIN DIRECT+TOT PNL SERPL-MCNC: 0.4 MG/DL
BUN SERPL-MCNC: 12.3 MG/DL (ref 7–18.7)
CALCIUM SERPL-MCNC: 8.3 MG/DL (ref 8.4–10.2)
CHLORIDE SERPL-SCNC: 108 MMOL/L (ref 98–107)
CO2 SERPL-SCNC: 16 MMOL/L (ref 22–29)
CREAT SERPL-MCNC: 0.93 MG/DL (ref 0.55–1.02)
EOSINOPHIL # BLD AUTO: 0.04 X10(3)/MCL (ref 0–0.9)
EOSINOPHIL NFR BLD AUTO: 0.4 %
ERYTHROCYTE [DISTWIDTH] IN BLOOD BY AUTOMATED COUNT: 15 % (ref 11.5–17)
GFR SERPLBLD CREATININE-BSD FMLA CKD-EPI: >60 MLS/MIN/1.73/M2
GLOBULIN SER-MCNC: 3.5 GM/DL (ref 2.4–3.5)
GLUCOSE SERPL-MCNC: 103 MG/DL (ref 74–100)
HCT VFR BLD AUTO: 24.8 % (ref 37–47)
HCT VFR BLD AUTO: 25.9 % (ref 37–47)
HGB BLD-MCNC: 8.2 GM/DL (ref 12–16)
HGB BLD-MCNC: 8.5 GM/DL (ref 12–16)
IMM GRANULOCYTES # BLD AUTO: 0.04 X10(3)/MCL (ref 0–0.04)
IMM GRANULOCYTES NFR BLD AUTO: 0.4 %
LYMPHOCYTES # BLD AUTO: 2.59 X10(3)/MCL (ref 0.6–4.6)
LYMPHOCYTES NFR BLD AUTO: 24.3 %
MACROCYTES BLD QL SMEAR: ABNORMAL
MCH RBC QN AUTO: 40.2 PG (ref 27–31)
MCHC RBC AUTO-ENTMCNC: 33.1 MG/DL (ref 33–36)
MCV RBC AUTO: 121.6 FL (ref 80–94)
MONOCYTES # BLD AUTO: 0.75 X10(3)/MCL (ref 0.1–1.3)
MONOCYTES NFR BLD AUTO: 7 %
NEUTROPHILS # BLD AUTO: 7.2 X10(3)/MCL (ref 2.1–9.2)
NEUTROPHILS NFR BLD AUTO: 67.4 %
NRBC BLD AUTO-RTO: 0 %
PLATELET # BLD AUTO: 290 X10(3)/MCL (ref 130–400)
PLATELET # BLD EST: NORMAL 10*3/UL
PMV BLD AUTO: 10.6 FL (ref 7.4–10.4)
POIKILOCYTOSIS BLD QL SMEAR: ABNORMAL
POLYCHROMASIA BLD QL SMEAR: ABNORMAL
POTASSIUM SERPL-SCNC: 4.3 MMOL/L (ref 3.5–5.1)
PROT SERPL-MCNC: 6.2 GM/DL (ref 6.4–8.3)
RBC # BLD AUTO: 2.04 X10(6)/MCL (ref 4.2–5.4)
RBC MORPH BLD: ABNORMAL
SODIUM SERPL-SCNC: 134 MMOL/L (ref 136–145)
TARGETS BLD QL SMEAR: ABNORMAL
WBC # SPEC AUTO: 10.7 X10(3)/MCL (ref 4.5–11.5)

## 2022-09-15 PROCEDURE — 25500020 PHARM REV CODE 255: Performed by: INTERNAL MEDICINE

## 2022-09-15 PROCEDURE — 36415 COLL VENOUS BLD VENIPUNCTURE: CPT | Performed by: INTERNAL MEDICINE

## 2022-09-15 PROCEDURE — 25000003 PHARM REV CODE 250: Performed by: INTERNAL MEDICINE

## 2022-09-15 PROCEDURE — 80053 COMPREHEN METABOLIC PANEL: CPT | Performed by: PHYSICIAN ASSISTANT

## 2022-09-15 PROCEDURE — 25000003 PHARM REV CODE 250: Performed by: NURSE PRACTITIONER

## 2022-09-15 PROCEDURE — 70553 MRI BRAIN STEM W/O & W/DYE: CPT | Mod: TC

## 2022-09-15 PROCEDURE — 85014 HEMATOCRIT: CPT | Performed by: INTERNAL MEDICINE

## 2022-09-15 PROCEDURE — 63600175 PHARM REV CODE 636 W HCPCS: Performed by: NURSE PRACTITIONER

## 2022-09-15 PROCEDURE — 11000001 HC ACUTE MED/SURG PRIVATE ROOM

## 2022-09-15 PROCEDURE — 25000003 PHARM REV CODE 250: Performed by: PHYSICIAN ASSISTANT

## 2022-09-15 PROCEDURE — 63600175 PHARM REV CODE 636 W HCPCS: Performed by: INTERNAL MEDICINE

## 2022-09-15 PROCEDURE — 85025 COMPLETE CBC W/AUTO DIFF WBC: CPT | Performed by: INTERNAL MEDICINE

## 2022-09-15 PROCEDURE — A9577 INJ MULTIHANCE: HCPCS | Performed by: INTERNAL MEDICINE

## 2022-09-15 RX ORDER — METOPROLOL SUCCINATE 25 MG/1
25 TABLET, EXTENDED RELEASE ORAL ONCE
Status: COMPLETED | OUTPATIENT
Start: 2022-09-15 | End: 2022-09-15

## 2022-09-15 RX ORDER — HYDROMORPHONE HYDROCHLORIDE 2 MG/ML
0.5 INJECTION, SOLUTION INTRAMUSCULAR; INTRAVENOUS; SUBCUTANEOUS EVERY 6 HOURS PRN
Status: DISCONTINUED | OUTPATIENT
Start: 2022-09-15 | End: 2022-09-20

## 2022-09-15 RX ORDER — METOPROLOL SUCCINATE 50 MG/1
50 TABLET, EXTENDED RELEASE ORAL ONCE
Status: COMPLETED | OUTPATIENT
Start: 2022-09-15 | End: 2022-09-15

## 2022-09-15 RX ORDER — POLYETHYLENE GLYCOL 3350 17 G/17G
17 POWDER, FOR SOLUTION ORAL 2 TIMES DAILY PRN
Status: DISCONTINUED | OUTPATIENT
Start: 2022-09-15 | End: 2022-09-21 | Stop reason: HOSPADM

## 2022-09-15 RX ADMIN — METOPROLOL SUCCINATE 25 MG: 25 TABLET, EXTENDED RELEASE ORAL at 01:09

## 2022-09-15 RX ADMIN — GADOBENATE DIMEGLUMINE 15 ML: 529 INJECTION, SOLUTION INTRAVENOUS at 05:09

## 2022-09-15 RX ADMIN — HYDROMORPHONE HYDROCHLORIDE 0.5 MG: 2 INJECTION INTRAMUSCULAR; INTRAVENOUS; SUBCUTANEOUS at 09:09

## 2022-09-15 RX ADMIN — SODIUM CHLORIDE: 9 INJECTION, SOLUTION INTRAVENOUS at 04:09

## 2022-09-15 RX ADMIN — RIVAROXABAN 15 MG: 15 TABLET, FILM COATED ORAL at 04:09

## 2022-09-15 RX ADMIN — RIVAROXABAN 15 MG: 15 TABLET, FILM COATED ORAL at 09:09

## 2022-09-15 RX ADMIN — ONDANSETRON 4 MG: 2 INJECTION INTRAMUSCULAR; INTRAVENOUS at 10:09

## 2022-09-15 RX ADMIN — POLYETHYLENE GLYCOL 3350 17 G: 17 POWDER, FOR SOLUTION ORAL at 01:09

## 2022-09-15 RX ADMIN — METOPROLOL SUCCINATE 50 MG: 50 TABLET, EXTENDED RELEASE ORAL at 01:09

## 2022-09-15 NOTE — NURSING
Called MRI to ask when they will perform MRI of brain; Anup stated they have a note from their day shift staff saying it's okay to do tomorrow morning.  Informed him the MRI was ordered stat yesterday and was to be done prior to the thrombectomy due to DVT and bilateral PE. That Dr. Victoria wanted to know why it wasn't done prior to thrombectomy and wanted it done post procedure.   He made a comment re: the note again. I educated him he may want to follow the MD's wishes especially d/t nature of case.

## 2022-09-15 NOTE — PROGRESS NOTES
[facility] - Oncology Acute  Cardiology  Progress Note    Patient Name: Nahed Harvey  MRN: [id number]  Admission Date: 9/13/2022  Hospital Length of Stay: 1 days  Code Status: Full Code   Attending Physician: Nusrat Kirk MD   Primary Care Physician: Ford Saba MD  Expected Discharge Date:   Principal Problem:Bilateral pulmonary embolism    Subjective:     Brief HPI:   This is a 46-year-old female, who is unknown to [facility], with history of gastroparesis and hypertension.  She presented to [facility] on 09/13/2022 with complaints of weakness.  She reported feeling weak and having chest pain that started approximately 3 weeks prior to arrival with associated shortness of breath, nausea, sharp radiating pain in her back with deep breathing, and changes in her eating habits.  She reported that the symptoms subside when she lies flat on her back.  BNP was 1419.4.  Troponin was mildly elevated with a max of 0.291.  CTA of the chest revealed bilateral pulmonary embolism.  V/Q scan was normal.  [facility] has been consulted for bilateral PE.     Hospital Course:   9.15.22: NAD noted. Improved SOB, Denies CP/Palps. Underwent mechanical thrombectomy of bilateral pulmonary arteries. VSS. SR on tele.     PMH:  Gastroparesis, HTN  PSH:  Nephrectomy  Social History:  UDS positive for cannabis, denies EtOH and tobacco use  Family History:  Noncontributory     Previous Cardiac Diagnostics:   Pulmonary Angiogram 9.14.22  Acute pulmonary embolism  Bilateral lungs  Patient had successful removal of bilateral pulmonary emboli  Consider DVT thrombectomy in the future  Start Xarelto b.i.d.    Echo 9.14.22  The left ventricle is normal in size with concentric remodeling and normal systolic function.  The estimated ejection fraction is 60%.  Grade I left ventricular diastolic dysfunction.  Severe right ventricular enlargement with severely reduced right ventricular systolic function.  Severe right atrial enlargement.  Elevated central  venous pressure (15 mmHg).  There is abnormal septal wall motion. There is systolic flattening of the interventricular septum consistent with right ventricle pressure overload.    BLE Venous NIVA 9.14.22  Right lower extremity demonstrates a mobile thrombus of the common femoral vein. Right femoral vein, popliteal, posterior tibial and peroneal all demonstrate non compressibility and no flow.     - Nurse for patient room (Tariq) was verbally notified of positive findings at 11:58  Left lower extremity demonstrates complete collapse with probe compression and appropriate augment response.    Review of Systems   Cardiovascular:  Positive for leg swelling. Negative for chest pain and palpitations.   Respiratory:  Positive for shortness of breath.          Objective:     Vital Signs (Most Recent):  Temp: 98.2 °F (36.8 °C) (09/15/22 0823)  Pulse: 108 (09/15/22 0823)  Resp: 18 (09/15/22 0911)  BP: 121/88 (09/15/22 0823)  SpO2: 95 % (09/15/22 0823) Vital Signs (24h Range):  Temp:  [97.4 °F (36.3 °C)-98.7 °F (37.1 °C)] 98.2 °F (36.8 °C)  Pulse:  [101-129] 108  Resp:  [18-22] 18  SpO2:  [93 %-97 %] 95 %  BP: (108-125)/(81-94) 121/88     Weight: 74.8 kg (165 lb)  Body mass index is 28.32 kg/m².    SpO2: 95 %  O2 Device (Oxygen Therapy): room air      Intake/Output Summary (Last 24 hours) at 9/15/2022 1001  Last data filed at 9/15/2022 0100  Gross per 24 hour   Intake --   Output 275 ml   Net -275 ml       Lines/Drains/Airways       Peripheral Intravenous Line  Duration                  Peripheral IV - Single Lumen 09/13/22 1536 20 G Posterior;Right Hand 1 day         Peripheral IV - Single Lumen 09/13/22 1712 20 G Right Antecubital 1 day                    Significant Labs: CMP   Recent Labs   Lab 09/13/22  1619 09/14/22  0329 09/15/22  0529    135* 134*   K 4.6 4.4 4.3   CO2 17* 14* 16*   BUN 15.1 14.0 12.3   CREATININE 1.16* 1.03* 0.93   CALCIUM 9.0 8.4 8.3*   ALBUMIN 3.0* 3.0* 2.7*   BILITOT 0.3 0.4 0.4   ALKPHOS 91  97 86   AST 79* 125* 114*   * 142* 151*   , CBC   Recent Labs   Lab 09/14/22  0007 09/14/22  0329 09/15/22  0529   WBC 10.8 10.8 10.7   HGB 9.9* 9.7* 8.2*   HCT 29.1* 26.6* 24.8*    266 290   , and Troponin   Recent Labs   Lab 09/14/22  0329 09/14/22  0948 09/14/22  1043   TROPONINI 0.210* 0.162* 0.190*       Telemetry:  SR    Physical Exam:  Physical Exam  Constitutional:       Appearance: Normal appearance.   HENT:      Head: Atraumatic.   Eyes:      Extraocular Movements: Extraocular movements intact.      Conjunctiva/sclera: Conjunctivae normal.   Cardiovascular:      Rate and Rhythm: Normal rate and regular rhythm.      Pulses: Normal pulses.      Heart sounds: Normal heart sounds.      Comments: L Groin Soft/Flat, Non-Tender, No Sign of Bleed/Infection. +2 BLE Palpable Pedal Pulses    Pulmonary:      Effort: Pulmonary effort is normal.      Breath sounds: Normal breath sounds.   Abdominal:      Palpations: Abdomen is soft.   Musculoskeletal:         General: Normal range of motion.      Cervical back: Neck supple.      Right lower leg: Edema present.   Skin:     General: Skin is warm and dry.   Neurological:      Mental Status: She is alert and oriented to person, place, and time.   Psychiatric:         Mood and Affect: Mood normal.         Behavior: Behavior normal.       Current Inpatient Medications:    Current Facility-Administered Medications:     0.9%  NaCl infusion, , Intravenous, Continuous, Hawa Post PA-C, Last Rate: 50 mL/hr at 09/14/22 2116, New Bag at 09/14/22 2116    acetaminophen tablet 650 mg, 650 mg, Oral, Q6H PRN, JO-ANN Prado-BC    aluminum-magnesium hydroxide-simethicone 200-200-20 mg/5 mL suspension 30 mL, 30 mL, Oral, QID PRN, STEVE Prado    heparin (porcine) injection, , , PRN, Juancarlos Victoria MD, 3,000 Units at 09/14/22 1939    HYDROmorphone (PF) injection 0.5 mg, 0.5 mg, Intravenous, Q6H PRN, Dexter Treadwell MD, 0.5 mg at 09/15/22  0911    iopamidoL (ISOVUE-370) injection, , , PRN, Juancarlos Victoria MD, 60 mL at 09/14/22 2030    LIDOcaine (PF) 20 mg/ml (2%) injection, , , PRN, Juancarlos Victoria MD, 10 mL at 09/14/22 1843    melatonin tablet 6 mg, 6 mg, Oral, Nightly PRN, JO-ANN Prado-BC    midazolam (VERSED) 1 mg/mL injection, , , PRN, Juancarlos Victoria MD, 1 mg at 09/14/22 1851    naloxone 0.4 mg/mL injection 0.02 mg, 0.02 mg, Intravenous, PRN, STEVE Prado    ondansetron injection 4 mg, 4 mg, Intravenous, Q4H PRN, STEVE Prado, 4 mg at 09/14/22 1800    polyethylene glycol packet 17 g, 17 g, Oral, Daily, STEVE Prado    prochlorperazine injection Soln 5 mg, 5 mg, Intravenous, Q6H PRN, STEVE Prado    rivaroxaban tablet 15 mg, 15 mg, Oral, BID WM, Juancarlos Victoria MD, 15 mg at 09/15/22 0902    simethicone chewable tablet 80 mg, 1 tablet, Oral, QID PRN, JO-ANN Prado-BC    sodium chloride 0.9% flush 3 mL, 3 mL, Intravenous, PRN, STEVE Cifuentes        Assessment:     IMPRESSION:  Bilateral Pulmonary embolism/mechanical thrombectomy  RLE DVT  Right frontal lobe mass, 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 Dr. Victoria will evaluate patient again in AM.  Risk, Benefits and Alternatives Reviewed and Discussed with the PT and their Family and they wish to proceed with above Procedure.    Case and plan discussed with STEVE Rosado  Cardiology  [facility] - Oncology [facility]  09/15/2022

## 2022-09-15 NOTE — PROGRESS NOTES
[facility] Hospital Medicine Progress Note        Chief Complaint: Inpatient Follow-up for Eduardo MI and Right leg DVT    HPI:   Nahed Harvey is a 46 y.o. White female with a past medical history of hypertension, gastroparesis and kidney donor status post left nephrectomy. The patient presented to [facility] on 9/13/2022 with a primary complaint of epigastric abdominal pain with associated nausea, dry heaving and fatigue. She reports over the last week having right lower back pain with deep inspiration. On 9/11/2022 she was going to the restroom began experiencing tunnel vision and a near syncopal episode.  She reports sliding to the floor but was unable to get up due to weakness. Patient also complains of intermittent chest tightness, shortness of breath with exertion and pain behind the right knee which began today (09/14/2022) and chronic diarrhea. She is a former smoker who quit approximately 10 years ago. She is currently on birth control. She denies recent travel, history of prior blood clots/pulmonary embolism, history of cancer and history of clotting disorder in her family. Her mom who is in her 70s recently was diagnosed with a pulmonary embolism.  Patient reports experiencing migraine headaches approximately 25 years ago which an MRI of the brain was performed revealed a meningioma.  Repeat imaging was perform 5 years later and meningioma was stable.  Her nephrologist is Dr. Saba ( has unilateral kidney, she donated her kidney to a family member in 2008 )     Upon presentation to the ED, temperature 99.5° F, heart rate 123, blood pressure 150/160 and SpO2 97% on room air.  POC ABG with pH 7.44, pCO2 18.5, PO2 77, pHCO3 12.7. Labs with H&H 10.7/30, , CO2 17, BUN/creatinine 15.1/1.16 (9.4/0.95 on 06/09/2022), AST 79, , BNP 1419, initial troponin 0.285 with repeat decreased in the 0.210.  UDS positive for cannabis.  Influenza a and B and SARS-CoV-2 PCR negative.  UA  with trace bacteria, 6 WBC, 2+ bilirubin, 1+ occult blood, trace ketones and trace protein.  Initial EKG sinus tachycardia with a ventricular rate of 117 and nonspecific ST abnormalities.  Repeat EKG with sinus tachycardia, anterior infarct and T-wave abnormalities consider inferolateral ischemia.  Chest x-ray with blunting of the left costophrenic sulcus related to trace effusion or atelectasis. CT of the head with 2.8 cm dense lesion along the right frontal lobe without mass effect or surrounding edema, malignancy unable to be excluded however findings may represent meningioma further evaluation recommended with MRI. CT chest, abdomen and pelvis with contrast revealed large bilateral PE with evidence of right heart strain, heterogenicity throughout the liver with focal hepatic nodule for which malignancy is unable to be excluded, hepatitis may be present and gallbladder thickening related to hepatic dysfunction however acute cholecystitis unable to be excluded.  V/Q scan revealed normal ventilation and perfusion.  While in ED patient was started on heparin drip and Cardiology was consulted.  Patient admitted to hospital medicine services for further medical management.  Interval Hx:   Patient today awake and comfortable. Off O2 and sats are >94%. Denies any chest pain or SOB. Has mild right leg pain. +Nausea but no vomiting. No fever or chills. Explained the treatment plan in detail.   No family at bedside.     Objective/physical exam:  General: In no acute distress, afebrile  Chest: Clear to auscultation bilaterally  Heart: Mild tachycardia, sinus rhythm  +S1, S2, no appreciable murmur  Abdomen: Soft, nontender, BS +  MSK: Warm, no lower extremity edema, no clubbing or cyanosis  Neurologic: Alert and oriented x4, Cranial nerve II-XII intact, Strength 5/5 in all 4 extremities    VITAL SIGNS: 24 HRS MIN & MAX LAST   Temp  Min: 97.4 °F (36.3 °C)  Max: 98.7 °F (37.1 °C) 98 °F (36.7 °C)   BP  Min: 104/68  Max: 125/85  104/68   Pulse  Min: 101  Max: 129  (!) 124     Resp  Min: 18  Max: 20 18   SpO2  Min: 93 %  Max: 97 % 96 %       Recent Labs   Lab 09/14/22  0007 09/14/22  0329 09/15/22  0529   WBC 10.8 10.8 10.7   RBC 2.38* 2.17* 2.04*   HGB 9.9* 9.7* 8.2*   HCT 29.1* 26.6* 24.8*   .3* 122.6* 121.6*   MCH 41.6* 44.7* 40.2*   MCHC 34.0 36.5* 33.1   RDW 15.2 15.0 15.0    266 290   MPV 10.5* 10.6* 10.6*       Recent Labs   Lab 09/13/22  1619 09/13/22  1717 09/14/22  0329 09/15/22  0529     --  135* 134*   K 4.6  --  4.4 4.3   CO2 17*  --  14* 16*   BUN 15.1  --  14.0 12.3   CREATININE 1.16*  --  1.03* 0.93   CALCIUM 9.0  --  8.4 8.3*   PH  --  7.444  --   --    MG 2.10  --  2.00  --    ALBUMIN 3.0*  --  3.0* 2.7*   ALKPHOS 91  --  97 86   *  --  142* 151*   AST 79*  --  125* 114*   BILITOT 0.3  --  0.4 0.4          Microbiology Results (last 7 days)       Procedure Component Value Units Date/Time    Blood Culture #1 **CANNOT BE ORDERED STAT** [[id number]]  (Normal) Collected: 09/13/22 1715    Order Status: Completed Specimen: Blood from Arm, Right Updated: 09/15/22 1200     CULTURE, BLOOD ([facility]) No Growth At 24 Hours    Blood Culture #2 **CANNOT BE ORDERED STAT** [[id number]]  (Normal) Collected: 09/13/22 1723    Order Status: Completed Specimen: Blood from Forearm, Right Updated: 09/15/22 1200     CULTURE, BLOOD ([facility]) No Growth At 24 Hours             See below for Radiology    Scheduled Med:   polyethylene glycol  17 g Oral Daily    rivaroxaban  15 mg Oral BID WM        Continuous Infusions:   sodium chloride 0.9% 50 mL/hr at 09/14/22 2931        PRN Meds:  acetaminophen, aluminum-magnesium hydroxide-simethicone, heparin (porcine), HYDROmorphone, iopamidoL, LIDOcaine (PF) 20 mg/ml (2%), melatonin, midazolam, naloxone, ondansetron, prochlorperazine, simethicone, sodium chloride 0.9%       Assessment/Plan:  Bilateral pulmonary embolism with right heart strain s/p thrombectomy   Right leg acute DVT   Acute  cholecystitis  Macrocytic anemia: worse   Acute kidney injury : resolved   Transaminitis  Essential hypertension  H/O Gastroparesis   h/o meningeoma   Hepatic nodule    Plan:  Patient had massive symptomatic PE and she underwent thrombectomy   She feels better today. No SOB or chest pain  She also has a right Leg DVT and will get a thrombectomy in am   Now on Xarelto. D/W Dr Victoria and he will switch to iv heparin tomorrow   She will also need a cholecystectomy when more stable. Will get consult surgery team in 24-48 hrs   Home meds reviewed, will start on low dose metoprolol   Her H&H is low today. Will check stat H&H and transfuse if needed  Cont bedrest and tele monitoring   Cont supportive care   Has a liver nodule, will f/u on AFP and if negative will need out patient evaluation     Labs in am    VTE prophylaxis: Xarelto     Patient condition:  Guarded    Anticipated discharge and Disposition:         All diagnosis and differential diagnosis have been reviewed; assessment and plan has been documented; I have personally reviewed the labs and test results that are presently available; I have reviewed the patients medication list; I have reviewed the consulting providers response and recommendations. I have reviewed or attempted to review medical records based upon their availability    All of the patient's questions have been  addressed and answered. Patient's is agreeable to the above stated plan. I will continue to monitor closely and make adjustments to medical management as needed.  _____________________________________________________________________    Nutrition Status:    Radiology:  MRI Brain W WO Contrast  Narrative: EXAMINATION:  MRI BRAIN W WO CONTRAST    CLINICAL HISTORY:  Right frontal lobe lesion;    TECHNIQUE:  Multiplanar, multisequence MR images of the brain were obtained with and without administration of intravenous contrast.    COMPARISON:  CT head dated 09/13/2022    FINDINGS:  Evaluation is  limited with no intracranial contrast visible on postcontrast images.    There is no restricted diffusion, hemorrhage or edema.  A T1/T2 hypointense extra-axial mass along the right frontal convexity measures 2.1 x 2.8 cm in size.  There are minimal scattered periventricular white matter T2/FLAIR hyperintensities, nonspecific.    There is local mass effect in the right frontal lobe.  There is no midline shift or herniation.  The basal cisterns are patent.  There is mild diffuse parenchymal volume loss.  There is no hydrocephalus or abnormal extra-axial fluid collection.  The major intracranial flow voids are patent.  There is mild scattered paranasal sinus mucosal thickening.  Impression: 1. A 2.8 cm right frontal extra-axial mass is most suggestive of a meningioma.  2. Mild chronic microvascular ischemic changes.    Electronically signed by: Mariely Smart  Date:    09/15/2022  Time:    10:52      Dexter Treadwell MD   09/15/2022

## 2022-09-16 LAB
ABO + RH BLD: NORMAL
ABO + RH BLD: NORMAL
AFP-TM SERPL-MCNC: 2.7 NG/ML
ALBUMIN SERPL-MCNC: 2.5 GM/DL (ref 3.5–5)
ALBUMIN/GLOB SERPL: 0.8 RATIO (ref 1.1–2)
ALP SERPL-CCNC: 91 UNIT/L (ref 40–150)
ALT SERPL-CCNC: 154 UNIT/L (ref 0–55)
AST SERPL-CCNC: 95 UNIT/L (ref 5–34)
BASOPHILS # BLD AUTO: 0.03 X10(3)/MCL (ref 0–0.2)
BASOPHILS # BLD AUTO: 0.03 X10(3)/MCL (ref 0–0.2)
BASOPHILS NFR BLD AUTO: 0.4 %
BASOPHILS NFR BLD AUTO: 0.4 %
BILIRUBIN DIRECT+TOT PNL SERPL-MCNC: 0.3 MG/DL
BLD PROD TYP BPU: NORMAL
BLD PROD TYP BPU: NORMAL
BLOOD UNIT EXPIRATION DATE: NORMAL
BLOOD UNIT EXPIRATION DATE: NORMAL
BLOOD UNIT TYPE CODE: 5100
BLOOD UNIT TYPE CODE: 5100
BUN SERPL-MCNC: 10.1 MG/DL (ref 7–18.7)
CALCIUM SERPL-MCNC: 8.3 MG/DL (ref 8.4–10.2)
CHLORIDE SERPL-SCNC: 108 MMOL/L (ref 98–107)
CO2 SERPL-SCNC: 18 MMOL/L (ref 22–29)
CREAT SERPL-MCNC: 0.83 MG/DL (ref 0.55–1.02)
CROSSMATCH INTERPRETATION: NORMAL
CROSSMATCH INTERPRETATION: NORMAL
DISPENSE STATUS: NORMAL
DISPENSE STATUS: NORMAL
EOSINOPHIL # BLD AUTO: 0.03 X10(3)/MCL (ref 0–0.9)
EOSINOPHIL # BLD AUTO: 0.03 X10(3)/MCL (ref 0–0.9)
EOSINOPHIL NFR BLD AUTO: 0.4 %
EOSINOPHIL NFR BLD AUTO: 0.4 %
ERYTHROCYTE [DISTWIDTH] IN BLOOD BY AUTOMATED COUNT: 14.7 % (ref 11.5–17)
ERYTHROCYTE [DISTWIDTH] IN BLOOD BY AUTOMATED COUNT: 15 % (ref 11.5–17)
GFR SERPLBLD CREATININE-BSD FMLA CKD-EPI: >60 MLS/MIN/1.73/M2
GLOBULIN SER-MCNC: 3.2 GM/DL (ref 2.4–3.5)
GLUCOSE SERPL-MCNC: 111 MG/DL (ref 74–100)
GROUP & RH: NORMAL
HCT VFR BLD AUTO: 22.7 % (ref 37–47)
HCT VFR BLD AUTO: 23.5 % (ref 37–47)
HEMATOLOGIST REVIEW: NORMAL
HGB BLD-MCNC: 7.6 GM/DL (ref 12–16)
HGB BLD-MCNC: 7.8 GM/DL (ref 12–16)
IMM GRANULOCYTES # BLD AUTO: 0.04 X10(3)/MCL (ref 0–0.04)
IMM GRANULOCYTES # BLD AUTO: 0.05 X10(3)/MCL (ref 0–0.04)
IMM GRANULOCYTES NFR BLD AUTO: 0.5 %
IMM GRANULOCYTES NFR BLD AUTO: 0.6 %
INDIRECT COOMBS GEL: NORMAL
LYMPHOCYTES # BLD AUTO: 1.74 X10(3)/MCL (ref 0.6–4.6)
LYMPHOCYTES # BLD AUTO: 1.98 X10(3)/MCL (ref 0.6–4.6)
LYMPHOCYTES NFR BLD AUTO: 21.2 %
LYMPHOCYTES NFR BLD AUTO: 24.1 %
MCH RBC QN AUTO: 41.5 PG (ref 27–31)
MCH RBC QN AUTO: 42 PG (ref 27–31)
MCHC RBC AUTO-ENTMCNC: 33.2 MG/DL (ref 33–36)
MCHC RBC AUTO-ENTMCNC: 33.5 MG/DL (ref 33–36)
MCV RBC AUTO: 125 FL (ref 80–94)
MCV RBC AUTO: 125.4 FL (ref 80–94)
MONOCYTES # BLD AUTO: 0.5 X10(3)/MCL (ref 0.1–1.3)
MONOCYTES # BLD AUTO: 0.61 X10(3)/MCL (ref 0.1–1.3)
MONOCYTES NFR BLD AUTO: 6.1 %
MONOCYTES NFR BLD AUTO: 7.4 %
NEUTROPHILS # BLD AUTO: 5.5 X10(3)/MCL (ref 2.1–9.2)
NEUTROPHILS # BLD AUTO: 5.8 X10(3)/MCL (ref 2.1–9.2)
NEUTROPHILS NFR BLD AUTO: 67.2 %
NEUTROPHILS NFR BLD AUTO: 71.3 %
NRBC BLD AUTO-RTO: 0 %
NRBC BLD AUTO-RTO: 0 %
PLATELET # BLD AUTO: 251 X10(3)/MCL (ref 130–400)
PLATELET # BLD AUTO: 260 X10(3)/MCL (ref 130–400)
PMV BLD AUTO: 10.4 FL (ref 7.4–10.4)
PMV BLD AUTO: 10.5 FL (ref 7.4–10.4)
POTASSIUM SERPL-SCNC: 3.9 MMOL/L (ref 3.5–5.1)
PROT SERPL-MCNC: 5.7 GM/DL (ref 6.4–8.3)
RBC # BLD AUTO: 1.81 X10(6)/MCL (ref 4.2–5.4)
RBC # BLD AUTO: 1.88 X10(6)/MCL (ref 4.2–5.4)
SODIUM SERPL-SCNC: 133 MMOL/L (ref 136–145)
UNIT NUMBER: NORMAL
UNIT NUMBER: NORMAL
WBC # SPEC AUTO: 8.2 X10(3)/MCL (ref 4.5–11.5)
WBC # SPEC AUTO: 8.2 X10(3)/MCL (ref 4.5–11.5)

## 2022-09-16 PROCEDURE — 63600175 PHARM REV CODE 636 W HCPCS: Performed by: INTERNAL MEDICINE

## 2022-09-16 PROCEDURE — 99152 MOD SED SAME PHYS/QHP 5/>YRS: CPT | Performed by: INTERNAL MEDICINE

## 2022-09-16 PROCEDURE — 85025 COMPLETE CBC W/AUTO DIFF WBC: CPT | Performed by: INTERNAL MEDICINE

## 2022-09-16 PROCEDURE — 80053 COMPREHEN METABOLIC PANEL: CPT | Performed by: INTERNAL MEDICINE

## 2022-09-16 PROCEDURE — 85060 BLOOD SMEAR INTERPRETATION: CPT | Performed by: INTERNAL MEDICINE

## 2022-09-16 PROCEDURE — 94761 N-INVAS EAR/PLS OXIMETRY MLT: CPT

## 2022-09-16 PROCEDURE — 25500020 PHARM REV CODE 255: Performed by: INTERNAL MEDICINE

## 2022-09-16 PROCEDURE — 37191 INS ENDOVAS VENA CAVA FILTR: CPT | Performed by: INTERNAL MEDICINE

## 2022-09-16 PROCEDURE — 25000003 PHARM REV CODE 250: Performed by: INTERNAL MEDICINE

## 2022-09-16 PROCEDURE — C1769 GUIDE WIRE: HCPCS | Performed by: INTERNAL MEDICINE

## 2022-09-16 PROCEDURE — 21400001 HC TELEMETRY ROOM

## 2022-09-16 PROCEDURE — 86920 COMPATIBILITY TEST SPIN: CPT

## 2022-09-16 PROCEDURE — 11000001 HC ACUTE MED/SURG PRIVATE ROOM

## 2022-09-16 PROCEDURE — 27000221 HC OXYGEN, UP TO 24 HOURS

## 2022-09-16 PROCEDURE — C1894 INTRO/SHEATH, NON-LASER: HCPCS | Performed by: INTERNAL MEDICINE

## 2022-09-16 PROCEDURE — 86850 RBC ANTIBODY SCREEN: CPT

## 2022-09-16 PROCEDURE — P9016 RBC LEUKOCYTES REDUCED: HCPCS

## 2022-09-16 PROCEDURE — 36430 TRANSFUSION BLD/BLD COMPNT: CPT

## 2022-09-16 PROCEDURE — C1880 VENA CAVA FILTER: HCPCS | Performed by: INTERNAL MEDICINE

## 2022-09-16 PROCEDURE — 25000003 PHARM REV CODE 250: Performed by: PHYSICIAN ASSISTANT

## 2022-09-16 PROCEDURE — 63600175 PHARM REV CODE 636 W HCPCS: Performed by: NURSE PRACTITIONER

## 2022-09-16 PROCEDURE — 36415 COLL VENOUS BLD VENIPUNCTURE: CPT | Performed by: INTERNAL MEDICINE

## 2022-09-16 PROCEDURE — C1887 CATHETER, GUIDING: HCPCS | Performed by: INTERNAL MEDICINE

## 2022-09-16 DEVICE — DENALI®  VENA CAVA FILTER JUGULAR/SUBCLAVIAN
Type: IMPLANTABLE DEVICE | Site: ABDOMEN | Status: FUNCTIONAL
Brand: DENALI® VENA CAVA FILTER

## 2022-09-16 RX ORDER — SODIUM CHLORIDE 0.9 % (FLUSH) 0.9 %
3 SYRINGE (ML) INJECTION
Status: DISCONTINUED | OUTPATIENT
Start: 2022-09-16 | End: 2022-09-21 | Stop reason: HOSPADM

## 2022-09-16 RX ORDER — HYDROCODONE BITARTRATE AND ACETAMINOPHEN 500; 5 MG/1; MG/1
TABLET ORAL
Status: DISCONTINUED | OUTPATIENT
Start: 2022-09-16 | End: 2022-09-21 | Stop reason: HOSPADM

## 2022-09-16 RX ORDER — ENOXAPARIN SODIUM 100 MG/ML
70 INJECTION SUBCUTANEOUS
Status: DISCONTINUED | OUTPATIENT
Start: 2022-09-16 | End: 2022-09-19

## 2022-09-16 RX ORDER — FENTANYL CITRATE 50 UG/ML
INJECTION, SOLUTION INTRAMUSCULAR; INTRAVENOUS
Status: DISCONTINUED | OUTPATIENT
Start: 2022-09-16 | End: 2022-09-20

## 2022-09-16 RX ADMIN — ONDANSETRON 4 MG: 2 INJECTION INTRAMUSCULAR; INTRAVENOUS at 09:09

## 2022-09-16 RX ADMIN — RIVAROXABAN 15 MG: 15 TABLET, FILM COATED ORAL at 07:09

## 2022-09-16 RX ADMIN — SODIUM CHLORIDE: 9 INJECTION, SOLUTION INTRAVENOUS at 11:09

## 2022-09-16 RX ADMIN — HYDROMORPHONE HYDROCHLORIDE 0.5 MG: 2 INJECTION INTRAMUSCULAR; INTRAVENOUS; SUBCUTANEOUS at 02:09

## 2022-09-16 RX ADMIN — HYDROMORPHONE HYDROCHLORIDE 0.5 MG: 2 INJECTION INTRAMUSCULAR; INTRAVENOUS; SUBCUTANEOUS at 09:09

## 2022-09-16 RX ADMIN — RIVAROXABAN 15 MG: 15 TABLET, FILM COATED ORAL at 06:09

## 2022-09-16 RX ADMIN — ONDANSETRON 4 MG: 2 INJECTION INTRAMUSCULAR; INTRAVENOUS at 07:09

## 2022-09-16 NOTE — PROGRESS NOTES
[facility]  Cardiology  Progress Note    Patient Name: Nahed Harvey  MRN: [id number]  Admission Date: 9/13/2022  Hospital Length of Stay: 2 days  Code Status: Full Code   Attending Physician: Nusrat Kirk MD   Primary Care Physician: Ford Saba MD  Expected Discharge Date:   Principal Problem:Bilateral pulmonary embolism    Subjective:     Brief HPI:   This is a 46-year-old female, who is unknown to [facility], with history of gastroparesis and hypertension.  She presented to [facility] on 09/13/2022 with complaints of weakness.  She reported feeling weak and having chest pain that started approximately 3 weeks prior to arrival with associated shortness of breath, nausea, sharp radiating pain in her back with deep breathing, and changes in her eating habits.  She reported that the symptoms subside when she lies flat on her back.  BNP was 1419.4.  Troponin was mildly elevated with a max of 0.291.  CTA of the chest revealed bilateral pulmonary embolism.  V/Q scan was normal.  [facility] has been consulted for bilateral PE.     Hospital Course:   9.15.22: NAD noted. Improved SOB, Denies CP/Palps. Underwent mechanical thrombectomy of bilateral pulmonary arteries. VSS. SR on tele.   9.16.22: NAD noted. Still with mild AYALA. Mild RLE pain. Denies CP/Palps. VSS. SR on tele. H&H trending down.    PMH:  Gastroparesis, HTN  PSH:  Nephrectomy  Social History:  UDS positive for cannabis, denies EtOH and tobacco use  Family History:  Noncontributory     Previous Cardiac Diagnostics:   Pulmonary Angiogram 9.14.22  Acute pulmonary embolism  Bilateral lungs  Patient had successful removal of bilateral pulmonary emboli  Consider DVT thrombectomy in the future  Start Xarelto b.i.d.    Echo 9.14.22  The left ventricle is normal in size with concentric remodeling and normal systolic function.  The estimated ejection fraction is 60%.  Grade I left ventricular diastolic dysfunction.  Severe right ventricular enlargement with  severely reduced right ventricular systolic function.  Severe right atrial enlargement.  Elevated central venous pressure (15 mmHg).  There is abnormal septal wall motion. There is systolic flattening of the interventricular septum consistent with right ventricle pressure overload.    BLE Venous NIVA 9.14.22  Right lower extremity demonstrates a mobile thrombus of the common femoral vein. Right femoral vein, popliteal, posterior tibial and peroneal all demonstrate non compressibility and no flow.     - Nurse for patient room (Tariq) was verbally notified of positive findings at 11:58  Left lower extremity demonstrates complete collapse with probe compression and appropriate augment response.    Review of Systems   Cardiovascular:  Positive for leg swelling. Negative for chest pain and palpitations.   Respiratory:  Positive for shortness of breath.          Objective:     Vital Signs (Most Recent):  Temp: 98.4 °F (36.9 °C) (09/16/22 0719)  Pulse: 84 (09/16/22 0719)  Resp: 19 (09/16/22 0719)  BP: 109/81 (09/16/22 0719)  SpO2: 95 % (09/16/22 0719) Vital Signs (24h Range):  Temp:  [98 °F (36.7 °C)-98.8 °F (37.1 °C)] 98.4 °F (36.9 °C)  Pulse:  [] 84  Resp:  [18-19] 19  SpO2:  [91 %-98 %] 95 %  BP: (104-116)/(68-83) 109/81     Weight: 74.8 kg (165 lb)  Body mass index is 28.32 kg/m².    SpO2: 95 %  O2 Device (Oxygen Therapy): room air      Intake/Output Summary (Last 24 hours) at 9/16/2022 0926  Last data filed at 9/15/2022 2200  Gross per 24 hour   Intake 325 ml   Output --   Net 325 ml         Lines/Drains/Airways       Peripheral Intravenous Line  Duration                  Peripheral IV - Single Lumen 09/13/22 1536 20 G Posterior;Right Hand 2 days         Peripheral IV - Single Lumen 09/13/22 1712 20 G Right Antecubital 2 days                    Significant Labs: CMP   Recent Labs   Lab 09/15/22  0529 09/16/22  0509   * 133*   K 4.3 3.9   CO2 16* 18*   BUN 12.3 10.1   CREATININE 0.93 0.83   CALCIUM 8.3* 8.3*    ALBUMIN 2.7* 2.5*   BILITOT 0.4 0.3   ALKPHOS 86 91   * 95*   * 154*     , CBC   Recent Labs   Lab 09/15/22  0529 09/15/22  1408 09/16/22  0509   WBC 10.7  --  8.2   HGB 8.2* 8.5* 7.6*   HCT 24.8* 25.9* 22.7*     --  251     , and Troponin   Recent Labs   Lab 09/14/22  0948 09/14/22  1043   TROPONINI 0.162* 0.190*         Telemetry:  SR    Physical Exam:  Physical Exam  Constitutional:       Appearance: Normal appearance.   HENT:      Head: Atraumatic.   Eyes:      Extraocular Movements: Extraocular movements intact.      Conjunctiva/sclera: Conjunctivae normal.   Cardiovascular:      Rate and Rhythm: Normal rate and regular rhythm.      Pulses: Normal pulses.      Heart sounds: Normal heart sounds.      Comments: L Groin Soft/Flat, Non-Tender, No Sign of Bleed/Infection. +2 BLE Palpable Pedal Pulses    Pulmonary:      Effort: Pulmonary effort is normal.      Breath sounds: Normal breath sounds.   Abdominal:      Palpations: Abdomen is soft.   Musculoskeletal:         General: Normal range of motion.      Cervical back: Neck supple.      Right lower leg: Edema present.   Skin:     General: Skin is warm and dry.   Neurological:      Mental Status: She is alert and oriented to person, place, and time.   Psychiatric:         Mood and Affect: Mood normal.         Behavior: Behavior normal.       Current Inpatient Medications:    Current Facility-Administered Medications:     0.9%  NaCl infusion (for blood administration), , Intravenous, Q24H PRN, STEVE iCfuentes    0.9%  NaCl infusion, , Intravenous, Continuous, Hawa Post PA-C, Last Rate: 50 mL/hr at 09/15/22 1632, New Bag at 09/15/22 1632    acetaminophen tablet 650 mg, 650 mg, Oral, Q6H PRN, STEVE Prado    aluminum-magnesium hydroxide-simethicone 200-200-20 mg/5 mL suspension 30 mL, 30 mL, Oral, QID PRN, JO-ANN Prado-BC    heparin (porcine) injection, , , PRN, Juancarlos Victoria MD, 3,000 Units at 09/14/22  1939    HYDROmorphone (PF) injection 0.5 mg, 0.5 mg, Intravenous, Q6H PRN, Dexter Treadwell MD, 0.5 mg at 09/15/22 0911    iopamidoL (ISOVUE-370) injection, , , PRN, Juancarlos Victoria MD, 60 mL at 09/14/22 2030    LIDOcaine (PF) 20 mg/ml (2%) injection, , , PRN, Juancarlos Victoria MD, 10 mL at 09/14/22 1843    melatonin tablet 6 mg, 6 mg, Oral, Nightly PRN, STEVE Prado    midazolam (VERSED) 1 mg/mL injection, , , PRN, Juancarlos Victoria MD, 1 mg at 09/14/22 1851    naloxone 0.4 mg/mL injection 0.02 mg, 0.02 mg, Intravenous, PRN, STEVE Prado    ondansetron injection 4 mg, 4 mg, Intravenous, Q4H PRN, STEVE Prado, 4 mg at 09/16/22 0749    polyethylene glycol packet 17 g, 17 g, Oral, BID PRN, Dexter Treadwell MD, 17 g at 09/15/22 1344    prochlorperazine injection Soln 5 mg, 5 mg, Intravenous, Q6H PRN, JO-ANN Prado-BC    rivaroxaban tablet 15 mg, 15 mg, Oral, BID WM, Juancarlos Victoria MD, 15 mg at 09/16/22 0749    simethicone chewable tablet 80 mg, 1 tablet, Oral, QID PRN, JO-ANN Prado-BC    sodium chloride 0.9% flush 3 mL, 3 mL, Intravenous, PRN, STEVE Cifuentes        Assessment:     IMPRESSION:  Bilateral Pulmonary embolism/mechanical thrombectomy  RLE DVT  Right frontal lobe mass, likely hemangioma  Liver Nodule  Suspected cholecystitis  ANNEL (resolved)  Transaminitis  Gastroparesis  HTN  Anemia      Plan:     PLAN:  Continue Xarelto 15mg BID x 21 days then 20mg daiy  Left groin precautions/activity restrictions  Transfuse 2 units PRBC  IVC filter placement today  Risk, Benefits and Alternatives Reviewed and Discussed with the PT and their Family and they wish to proceed with above Procedure.  Patient now with IVC filter. Able to proceed with cholecystectomy without further work-up.  Patient is Low risk for MACE. RCRI 0.9%.    Patient seen with Dr. Victoria. He agrees with plan.      JO-ANN Cifuentes-BC  Cardiology  [facility] -  Oncology Acute  09/16/2022

## 2022-09-16 NOTE — PLAN OF CARE
YADI contacted Ritu Woodward ([phone]), financial counselor regarding patient's disclosure of current insurance status. Patient noted she does not have any health insurance despite chart displaying Medicare Part A & B. SW left a detail voice message for financial counselor.         YADI verified patient information listed on facesheet and obtained patient's SSN (). Following, Yadi spoke with Miss Fisha, financial counselor and she stated she will talk with patient and assist with the completion of Medicaid application.

## 2022-09-16 NOTE — PROGRESS NOTES
[facility] Hospital Medicine Progress Note        Chief Complaint: Inpatient Follow-up for Eduardo [place] and Right leg DVT    HPI:   Nahed Harvey is a 46 y.o. White female with a past medical history of hypertension, gastroparesis and kidney donor status post left nephrectomy. The patient presented to [facility] on 9/13/2022 with a primary complaint of epigastric abdominal pain with associated nausea, dry heaving and fatigue. She reports over the last week having right lower back pain with deep inspiration. On 9/11/2022 she was going to the restroom began experiencing tunnel vision and a near syncopal episode.  She reports sliding to the floor but was unable to get up due to weakness. Patient also complains of intermittent chest tightness, shortness of breath with exertion and pain behind the right knee which began today (09/14/2022) and chronic diarrhea. She is a former smoker who quit approximately 10 years ago. She is currently on birth control. She denies recent travel, history of prior blood clots/pulmonary embolism, history of cancer and history of clotting disorder in her family. Her mom who is in her 70s recently was diagnosed with a pulmonary embolism.  Patient reports experiencing migraine headaches approximately 25 years ago which an MRI of the brain was performed revealed a meningioma.  Repeat imaging was perform 5 years later and meningioma was stable.  Her nephrologist is Dr. Saba ( has unilateral kidney, she donated her kidney to a family member in 2008 )     Upon presentation to the ED, temperature 99.5° F, heart rate 123, blood pressure 150/160 and SpO2 97% on room air.  POC ABG with pH 7.44, pCO2 18.5, PO2 77, pHCO3 12.7. Labs with H&H 10.7/30, , CO2 17, BUN/creatinine 15.1/1.16 (9.4/0.95 on 06/09/2022), AST 79, , BNP 1419, initial troponin 0.285 with repeat decreased in the 0.210.  UDS positive for cannabis.  Influenza a and B and SARS-CoV-2 PCR negative.  UA  with trace bacteria, 6 WBC, 2+ bilirubin, 1+ occult blood, trace ketones and trace protein.  Initial EKG sinus tachycardia with a ventricular rate of 117 and nonspecific ST abnormalities.  Repeat EKG with sinus tachycardia, anterior infarct and T-wave abnormalities consider inferolateral ischemia.  Chest x-ray with blunting of the left costophrenic sulcus related to trace effusion or atelectasis. CT of the head with 2.8 cm dense lesion along the right frontal lobe without mass effect or surrounding edema, malignancy unable to be excluded however findings may represent meningioma further evaluation recommended with MRI. CT chest, abdomen and pelvis with contrast revealed large bilateral PE with evidence of right heart strain, heterogenicity throughout the liver with focal hepatic nodule for which malignancy is unable to be excluded, hepatitis may be present and gallbladder thickening related to hepatic dysfunction however acute cholecystitis unable to be excluded.  V/Q scan revealed normal ventilation and perfusion.  While in ED patient was started on heparin drip and Cardiology was consulted.  Patient admitted to hospital medicine services for further medical management.  Interval Hx:   Patient today awake and comfortable. Denies any SOB, chest pain fever or chills. Has mild nausea. Explained the next treatment plan in detail.   HR and O2 stats stable.       Objective/physical exam:  General: In no acute distress, afebrile  Chest: Clear to auscultation bilaterally  Heart: Mild tachycardia, sinus rhythm  +S1, S2, no appreciable murmur  Abdomen: Soft, Mild RUQ tenderness, BS +  MSK: Warm, no lower extremity edema, no clubbing or cyanosis  Neurologic: Alert and oriented x4, Cranial nerve II-XII intact, Strength 5/5 in all 4 extremities    VITAL SIGNS: 24 HRS MIN & MAX LAST   Temp  Min: 98.1 °F (36.7 °C)  Max: 99.5 °F (37.5 °C) 99.5 °F (37.5 °C)   BP  Min: 104/68  Max: 118/87 118/87   Pulse  Min: 84  Max: 124  84      Resp  Min: 18  Max: 19 19   SpO2  Min: 91 %  Max: 98 % 96 %       Recent Labs   Lab 09/15/22  0529 09/15/22  1408 09/16/22  0509 09/16/22  0904   WBC 10.7  --  8.2 8.2   RBC 2.04*  --  1.81* 1.88*   HGB 8.2* 8.5* 7.6* 7.8*   HCT 24.8* 25.9* 22.7* 23.5*   .6*  --  125.4* 125.0*   MCH 40.2*  --  42.0* 41.5*   MCHC 33.1  --  33.5 33.2   RDW 15.0  --  14.7 15.0     --  251 260   MPV 10.6*  --  10.5* 10.4       Recent Labs   Lab 09/13/22  1619 09/13/22  1717 09/14/22  0329 09/15/22  0529 09/16/22  0509     --  135* 134* 133*   K 4.6  --  4.4 4.3 3.9   CO2 17*  --  14* 16* 18*   BUN 15.1  --  14.0 12.3 10.1   CREATININE 1.16*  --  1.03* 0.93 0.83   CALCIUM 9.0  --  8.4 8.3* 8.3*   PH  --  7.444  --   --   --    MG 2.10  --  2.00  --   --    ALBUMIN 3.0*  --  3.0* 2.7* 2.5*   ALKPHOS 91  --  97 86 91   *  --  142* 151* 154*   AST 79*  --  125* 114* 95*   BILITOT 0.3  --  0.4 0.4 0.3          Microbiology Results (last 7 days)       Procedure Component Value Units Date/Time    Blood Culture #1 **CANNOT BE ORDERED STAT** [[id number]]  (Normal) Collected: 09/13/22 1715    Order Status: Completed Specimen: Blood from Arm, Right Updated: 09/16/22 1200     CULTURE, BLOOD ([facility]) No Growth At 48 Hours    Blood Culture #2 **CANNOT BE ORDERED STAT** [[id number]]  (Normal) Collected: 09/13/22 1723    Order Status: Completed Specimen: Blood from Forearm, Right Updated: 09/16/22 1200     CULTURE, BLOOD ([facility]) No Growth At 48 Hours             See below for Radiology    Scheduled Med:   rivaroxaban  15 mg Oral BID WM        Continuous Infusions:   sodium chloride 0.9% 50 mL/hr at 09/16/22 1104        PRN Meds:  sodium chloride, sodium chloride, acetaminophen, aluminum-magnesium hydroxide-simethicone, heparin (porcine), HYDROmorphone, iopamidoL, LIDOcaine (PF) 20 mg/ml (2%), melatonin, midazolam, naloxone, ondansetron, polyethylene glycol, prochlorperazine, simethicone, sodium chloride 0.9%, sodium chloride 0.9%        Assessment/Plan:  Bilateral pulmonary embolism with right heart strain s/p thrombectomy   Right leg acute DVT   Acute cholecystitis  Macrocytic anemia: worse   Acute kidney injury : resolved   Transaminitis  Essential hypertension  H/O Gastroparesis   h/o meningeoma   Hepatic nodule    Plan:  Patient feeling much hill today. Vitals stable  D/W Dr Victoria - intervention cardiologist. Recommended IVC filter placement today for right floating DVT.  Her H&H is low, will be transfused 1 unit of PRBC today   Dr Victoria is ok with patient getting a cholecystectomy after IVC filter placement   Will consult Surgery team.   Can switch to iv heparin or full dose lovenox before surgery    Patient had massive symptomatic PE and she underwent thrombectomy, now stable     Cont bedrest and tele monitoring   Cont supportive care   Has a liver nodule, will f/u on AFP and if negative will need out patient evaluation     Labs in am    Critical care note:  Critical care diagnosis: Severe anemia needing blood transfusion   Critical care interventions: Hands-on evaluation, review of labs/radiographs/records and discussion with patient and family if present  Critical care time spent: 45 minutes     VTE prophylaxis: Xarelto     Patient condition:  Guarded    Anticipated discharge and Disposition:         All diagnosis and differential diagnosis have been reviewed; assessment and plan has been documented; I have personally reviewed the labs and test results that are presently available; I have reviewed the patients medication list; I have reviewed the consulting providers response and recommendations. I have reviewed or attempted to review medical records based upon their availability    All of the patient's questions have been  addressed and answered. Patient's is agreeable to the above stated plan. I will continue to monitor closely and make adjustments to medical management as  needed.  _____________________________________________________________________    Nutrition Status:    Radiology:  MRI Brain W WO Contrast  Narrative: EXAMINATION:  MRI BRAIN W WO CONTRAST    CLINICAL HISTORY:  Right frontal lobe lesion;    TECHNIQUE:  Multiplanar, multisequence MR images of the brain were obtained with and without administration of intravenous contrast.    COMPARISON:  CT head dated 09/13/2022    FINDINGS:  Evaluation is limited with no intracranial contrast visible on postcontrast images.    There is no restricted diffusion, hemorrhage or edema.  A T1/T2 hypointense extra-axial mass along the right frontal convexity measures 2.1 x 2.8 cm in size.  There are minimal scattered periventricular white matter T2/FLAIR hyperintensities, nonspecific.    There is local mass effect in the right frontal lobe.  There is no midline shift or herniation.  The basal cisterns are patent.  There is mild diffuse parenchymal volume loss.  There is no hydrocephalus or abnormal extra-axial fluid collection.  The major intracranial flow voids are patent.  There is mild scattered paranasal sinus mucosal thickening.  Impression: 1. A 2.8 cm right frontal extra-axial mass is most suggestive of a meningioma.  2. Mild chronic microvascular ischemic changes.    Electronically signed by: Mariely Smart  Date:    09/15/2022  Time:    10:52      Dexter Treadwell MD   09/16/2022

## 2022-09-16 NOTE — PLAN OF CARE
Problem: Adult Inpatient Plan of Care  Goal: Plan of Care Review  Outcome: Ongoing, Progressing  Goal: Patient-Specific Goal (Individualized)  Outcome: Ongoing, Progressing  Goal: Absence of Hospital-Acquired Illness or Injury  Outcome: Ongoing, Progressing  Intervention: Identify and Manage Fall Risk  Flowsheets (Taken 9/16/2022 1536)  Safety Promotion/Fall Prevention:   assistive device/personal item within reach   commode/urinal/bedpan at bedside   side rails raised x 2   nonskid shoes/socks when out of bed  Intervention: Prevent Skin Injury  Flowsheets (Taken 9/16/2022 1536)  Body Position: position changed independently  Intervention: Prevent and Manage VTE (Venous Thromboembolism) Risk  Flowsheets (Taken 9/16/2022 1536)  Activity Management:   Up in chair - L3   Up to bedside commode - L3  Goal: Optimal Comfort and Wellbeing  Outcome: Ongoing, Progressing  Intervention: Provide Person-Centered Care  Flowsheets (Taken 9/16/2022 1536)  Trust Relationship/Rapport:   care explained   choices provided   emotional support provided   empathic listening provided   questions answered   questions encouraged   reassurance provided   thoughts/feelings acknowledged  Goal: Readiness for Transition of Care  Outcome: Ongoing, Progressing     Problem: Fall Injury Risk  Goal: Absence of Fall and Fall-Related Injury  Outcome: Ongoing, Progressing     Problem: Skin Injury Risk Increased  Goal: Skin Health and Integrity  Outcome: Ongoing, Progressing

## 2022-09-17 LAB
ALBUMIN SERPL-MCNC: 2.5 GM/DL (ref 3.5–5)
ALBUMIN/GLOB SERPL: 0.8 RATIO (ref 1.1–2)
ALP SERPL-CCNC: 92 UNIT/L (ref 40–150)
ALT SERPL-CCNC: 118 UNIT/L (ref 0–55)
AST SERPL-CCNC: 44 UNIT/L (ref 5–34)
BASOPHILS # BLD AUTO: 0.03 X10(3)/MCL (ref 0–0.2)
BASOPHILS NFR BLD AUTO: 0.4 %
BILIRUBIN DIRECT+TOT PNL SERPL-MCNC: 0.4 MG/DL
BUN SERPL-MCNC: 8.9 MG/DL (ref 7–18.7)
CALCIUM SERPL-MCNC: 8.2 MG/DL (ref 8.4–10.2)
CHLORIDE SERPL-SCNC: 108 MMOL/L (ref 98–107)
CO2 SERPL-SCNC: 16 MMOL/L (ref 22–29)
CREAT SERPL-MCNC: 0.85 MG/DL (ref 0.55–1.02)
EOSINOPHIL # BLD AUTO: 0.04 X10(3)/MCL (ref 0–0.9)
EOSINOPHIL NFR BLD AUTO: 0.5 %
ERYTHROCYTE [DISTWIDTH] IN BLOOD BY AUTOMATED COUNT: 21.2 % (ref 11.5–17)
GFR SERPLBLD CREATININE-BSD FMLA CKD-EPI: >60 MLS/MIN/1.73/M2
GLOBULIN SER-MCNC: 3.3 GM/DL (ref 2.4–3.5)
GLUCOSE SERPL-MCNC: 99 MG/DL (ref 74–100)
HCT VFR BLD AUTO: 28.4 % (ref 37–47)
HGB BLD-MCNC: 9.2 GM/DL (ref 12–16)
IMM GRANULOCYTES # BLD AUTO: 0.05 X10(3)/MCL (ref 0–0.04)
IMM GRANULOCYTES NFR BLD AUTO: 0.7 %
LYMPHOCYTES # BLD AUTO: 2.61 X10(3)/MCL (ref 0.6–4.6)
LYMPHOCYTES NFR BLD AUTO: 35.1 %
MCH RBC QN AUTO: 36.8 PG (ref 27–31)
MCHC RBC AUTO-ENTMCNC: 32.4 MG/DL (ref 33–36)
MCV RBC AUTO: 113.6 FL (ref 80–94)
MONOCYTES # BLD AUTO: 0.58 X10(3)/MCL (ref 0.1–1.3)
MONOCYTES NFR BLD AUTO: 7.8 %
NEUTROPHILS # BLD AUTO: 4.1 X10(3)/MCL (ref 2.1–9.2)
NEUTROPHILS NFR BLD AUTO: 55.5 %
NRBC BLD AUTO-RTO: 0 %
PLATELET # BLD AUTO: 198 X10(3)/MCL (ref 130–400)
PMV BLD AUTO: 10.6 FL (ref 7.4–10.4)
POTASSIUM SERPL-SCNC: 3.8 MMOL/L (ref 3.5–5.1)
PROT SERPL-MCNC: 5.8 GM/DL (ref 6.4–8.3)
RBC # BLD AUTO: 2.5 X10(6)/MCL (ref 4.2–5.4)
SODIUM SERPL-SCNC: 136 MMOL/L (ref 136–145)
WBC # SPEC AUTO: 7.4 X10(3)/MCL (ref 4.5–11.5)

## 2022-09-17 PROCEDURE — 85025 COMPLETE CBC W/AUTO DIFF WBC: CPT | Performed by: INTERNAL MEDICINE

## 2022-09-17 PROCEDURE — 80053 COMPREHEN METABOLIC PANEL: CPT | Performed by: INTERNAL MEDICINE

## 2022-09-17 PROCEDURE — 63600175 PHARM REV CODE 636 W HCPCS: Performed by: INTERNAL MEDICINE

## 2022-09-17 PROCEDURE — 36415 COLL VENOUS BLD VENIPUNCTURE: CPT | Performed by: INTERNAL MEDICINE

## 2022-09-17 PROCEDURE — 25000003 PHARM REV CODE 250: Performed by: INTERNAL MEDICINE

## 2022-09-17 PROCEDURE — C9113 INJ PANTOPRAZOLE SODIUM, VIA: HCPCS | Performed by: INTERNAL MEDICINE

## 2022-09-17 PROCEDURE — 25000003 PHARM REV CODE 250: Performed by: PHYSICIAN ASSISTANT

## 2022-09-17 PROCEDURE — 21400001 HC TELEMETRY ROOM

## 2022-09-17 PROCEDURE — 63600175 PHARM REV CODE 636 W HCPCS: Performed by: NURSE PRACTITIONER

## 2022-09-17 PROCEDURE — 11000001 HC ACUTE MED/SURG PRIVATE ROOM

## 2022-09-17 RX ORDER — CALCIUM CARBONATE 200(500)MG
500 TABLET,CHEWABLE ORAL 2 TIMES DAILY PRN
Status: DISCONTINUED | OUTPATIENT
Start: 2022-09-17 | End: 2022-09-21 | Stop reason: HOSPADM

## 2022-09-17 RX ORDER — SUCRALFATE 1 G/1
1 TABLET ORAL
Status: DISCONTINUED | OUTPATIENT
Start: 2022-09-17 | End: 2022-09-17

## 2022-09-17 RX ORDER — PANTOPRAZOLE SODIUM 40 MG/1
40 TABLET, DELAYED RELEASE ORAL DAILY
Status: DISCONTINUED | OUTPATIENT
Start: 2022-09-18 | End: 2022-09-21 | Stop reason: HOSPADM

## 2022-09-17 RX ORDER — PANTOPRAZOLE SODIUM 40 MG/10ML
40 INJECTION, POWDER, LYOPHILIZED, FOR SOLUTION INTRAVENOUS ONCE
Status: COMPLETED | OUTPATIENT
Start: 2022-09-17 | End: 2022-09-17

## 2022-09-17 RX ORDER — BUPROPION HYDROCHLORIDE 150 MG/1
300 TABLET ORAL DAILY
Status: DISCONTINUED | OUTPATIENT
Start: 2022-09-17 | End: 2022-09-21 | Stop reason: HOSPADM

## 2022-09-17 RX ADMIN — HYDROMORPHONE HYDROCHLORIDE 0.5 MG: 2 INJECTION INTRAMUSCULAR; INTRAVENOUS; SUBCUTANEOUS at 09:09

## 2022-09-17 RX ADMIN — PANTOPRAZOLE SODIUM 40 MG: 40 INJECTION, POWDER, FOR SOLUTION INTRAVENOUS at 06:09

## 2022-09-17 RX ADMIN — BUPROPION HYDROCHLORIDE 300 MG: 150 TABLET, FILM COATED, EXTENDED RELEASE ORAL at 06:09

## 2022-09-17 RX ADMIN — HYDROMORPHONE HYDROCHLORIDE 0.5 MG: 2 INJECTION INTRAMUSCULAR; INTRAVENOUS; SUBCUTANEOUS at 04:09

## 2022-09-17 RX ADMIN — ENOXAPARIN SODIUM 70 MG: 80 INJECTION SUBCUTANEOUS at 06:09

## 2022-09-17 RX ADMIN — HYDROMORPHONE HYDROCHLORIDE 0.5 MG: 2 INJECTION INTRAMUSCULAR; INTRAVENOUS; SUBCUTANEOUS at 08:09

## 2022-09-17 RX ADMIN — ONDANSETRON 4 MG: 2 INJECTION INTRAMUSCULAR; INTRAVENOUS at 08:09

## 2022-09-17 RX ADMIN — SODIUM CHLORIDE: 9 INJECTION, SOLUTION INTRAVENOUS at 05:09

## 2022-09-17 RX ADMIN — ENOXAPARIN SODIUM 70 MG: 80 INJECTION SUBCUTANEOUS at 09:09

## 2022-09-17 NOTE — PROGRESS NOTES
[facility]  Cardiology  Progress Note    Patient Name: Nahed Harvey  MRN: [id number]  Admission Date: 9/13/2022  Hospital Length of Stay: 3 days  Code Status: Full Code   Attending Physician: Nusrat Kirk MD   Primary Care Physician: Ford Saba MD  Expected Discharge Date:   Principal Problem:Bilateral pulmonary embolism    Subjective:     Brief HPI:   This is a 46-year-old female, who is unknown to [facility], with history of gastroparesis and hypertension.  She presented to [facility] on 09/13/2022 with complaints of weakness.  She reported feeling weak and having chest pain that started approximately 3 weeks prior to arrival with associated shortness of breath, nausea, sharp radiating pain in her back with deep breathing, and changes in her eating habits.  She reported that the symptoms subside when she lies flat on her back.  BNP was 1419.4.  Troponin was mildly elevated with a max of 0.291.  CTA of the chest revealed bilateral pulmonary embolism.  V/Q scan was normal.  [facility] has been consulted for bilateral PE.     Hospital Course:   9.15.22: NAD noted. Improved SOB, Denies CP/Palps. Underwent mechanical thrombectomy of bilateral pulmonary arteries. VSS. SR on tele.   9.16.22: NAD noted. Still with mild AYALA. Mild RLE pain. Denies CP/Palps. VSS. SR on tele. H&H trending down.  9.17.22: NAD noted. Underwent IVC filter via R IJ. Denies CP/Palps, continued improvement of SOB. H&H improved after transfusion.    PMH:  Gastroparesis, HTN  PSH:  Nephrectomy  Social History:  UDS positive for cannabis, denies EtOH and tobacco use  Family History:  Noncontributory     Previous Cardiac Diagnostics:   Pulmonary Angiogram 9.14.22  Acute pulmonary embolism  Bilateral lungs  Patient had successful removal of bilateral pulmonary emboli  Consider DVT thrombectomy in the future  Start Xarelto b.i.d.    Echo 9.14.22  The left ventricle is normal in size with concentric remodeling and normal systolic  function.  The estimated ejection fraction is 60%.  Grade I left ventricular diastolic dysfunction.  Severe right ventricular enlargement with severely reduced right ventricular systolic function.  Severe right atrial enlargement.  Elevated central venous pressure (15 mmHg).  There is abnormal septal wall motion. There is systolic flattening of the interventricular septum consistent with right ventricle pressure overload.    BLE Venous NIVA 9.14.22  Right lower extremity demonstrates a mobile thrombus of the common femoral vein. Right femoral vein, popliteal, posterior tibial and peroneal all demonstrate non compressibility and no flow.     - Nurse for patient room (Tariq) was verbally notified of positive findings at 11:58  Left lower extremity demonstrates complete collapse with probe compression and appropriate augment response.    Review of Systems   Cardiovascular:  Positive for leg swelling. Negative for chest pain and palpitations.   Respiratory:  Positive for shortness of breath.          Objective:     Vital Signs (Most Recent):  Temp: 97.7 °F (36.5 °C) (09/17/22 0320)  Pulse: 84 (09/17/22 0320)  Resp: 18 (09/17/22 0412)  BP: (!) 136/93 (09/17/22 0320)  SpO2: (!) 93 % (09/17/22 0320) Vital Signs (24h Range):  Temp:  [97.5 °F (36.4 °C)-99.5 °F (37.5 °C)] 97.7 °F (36.5 °C)  Pulse:  [83-95] 84  Resp:  [16-20] 18  SpO2:  [92 %-97 %] 93 %  BP: (118-136)/(84-96) 136/93     Weight: 74.8 kg (165 lb)  Body mass index is 28.32 kg/m².    SpO2: (!) 93 %  O2 Device (Oxygen Therapy): nasal cannula      Intake/Output Summary (Last 24 hours) at 9/17/2022 0836  Last data filed at 9/17/2022 0449  Gross per 24 hour   Intake 2037.91 ml   Output --   Net 2037.91 ml         Lines/Drains/Airways       Peripheral Intravenous Line  Duration                  Peripheral IV - Single Lumen 09/13/22 1536 20 G Posterior;Right Hand 3 days         Peripheral IV - Single Lumen 09/16/22 1609 18 G Anterior;Right Upper Arm <1 day                     Significant Labs: CMP   Recent Labs   Lab 09/16/22  0509 09/17/22  0359   * 136   K 3.9 3.8   CO2 18* 16*   BUN 10.1 8.9   CREATININE 0.83 0.85   CALCIUM 8.3* 8.2*   ALBUMIN 2.5* 2.5*   BILITOT 0.3 0.4   ALKPHOS 91 92   AST 95* 44*   * 118*     , CBC   Recent Labs   Lab 09/16/22  0509 09/16/22  0904 09/17/22  0359   WBC 8.2 8.2 7.4   HGB 7.6* 7.8* 9.2*   HCT 22.7* 23.5* 28.4*    260 198     , and Troponin   No results for input(s): TROPONINI in the last 48 hours.      Telemetry:  SR    Physical Exam:  Physical Exam  Constitutional:       Appearance: Normal appearance.   HENT:      Head: Atraumatic.   Eyes:      Extraocular Movements: Extraocular movements intact.      Conjunctiva/sclera: Conjunctivae normal.   Cardiovascular:      Rate and Rhythm: Normal rate and regular rhythm.      Pulses: Normal pulses.      Heart sounds: Normal heart sounds.      Comments: L Groin Soft/Flat, Non-Tender, No Sign of Bleed/Infection. +2 BLE Palpable Pedal Pulses  Right IJ access site flat. NO signs of infection, bleeding, or edema noted.  Pulmonary:      Effort: Pulmonary effort is normal.      Breath sounds: Normal breath sounds.   Abdominal:      Palpations: Abdomen is soft.   Musculoskeletal:         General: Normal range of motion.      Cervical back: Neck supple.      Right lower leg: Edema present.   Skin:     General: Skin is warm and dry.   Neurological:      Mental Status: She is alert and oriented to person, place, and time.   Psychiatric:         Mood and Affect: Mood normal.         Behavior: Behavior normal.       Current Inpatient Medications:    Current Facility-Administered Medications:     0.9%  NaCl infusion (for blood administration), , Intravenous, Q24H PRN, STEVE Cifuentes    0.9%  NaCl infusion (for blood administration), , Intravenous, Q24H PRN, Dexter Treadwell MD    0.9%  NaCl infusion, , Intravenous, Continuous, Hawa Post PA-C, Last Rate: 50 mL/hr at  09/16/22 1104, New Bag at 09/16/22 1104    acetaminophen tablet 650 mg, 650 mg, Oral, Q6H PRN, LUIS PradoP-BC    aluminum-magnesium hydroxide-simethicone 200-200-20 mg/5 mL suspension 30 mL, 30 mL, Oral, QID PRN, Corinne Nielsen AGACNP-BC    enoxaparin injection 70 mg, 70 mg, Subcutaneous, Q12H, Dexter Treadwell MD    fentaNYL 50 mcg/mL injection, , , PRN, Juancarlos Victoria MD, 50 mcg at 09/16/22 1327    heparin (porcine) injection, , , PRN, Juancarlos Victoria MD, 3,000 Units at 09/14/22 1939    HYDROmorphone (PF) injection 0.5 mg, 0.5 mg, Intravenous, Q6H PRN, Dexter Treadwell MD, 0.5 mg at 09/17/22 0412    iopamidoL (ISOVUE-370) injection, , , PRN, Juancarlos Victoria MD, 10 mL at 09/16/22 1331    LIDOcaine (PF) 20 mg/ml (2%) injection, , , PRN, Juancarlos Victoria MD, 5 mL at 09/16/22 1311    melatonin tablet 6 mg, 6 mg, Oral, Nightly PRN, LUIS PradoP-BC    midazolam (VERSED) 1 mg/mL injection, , , PRN, Juancarlos Victoria MD, 1 mg at 09/16/22 1327    naloxone 0.4 mg/mL injection 0.02 mg, 0.02 mg, Intravenous, PRN, LUIS PradoP-BC    ondansetron injection 4 mg, 4 mg, Intravenous, Q4H PRN, JO-ANN Prado-BC, 4 mg at 09/16/22 2150    polyethylene glycol packet 17 g, 17 g, Oral, BID PRN, Dexter Treadwell MD, 17 g at 09/15/22 1344    prochlorperazine injection Soln 5 mg, 5 mg, Intravenous, Q6H PRN, LUIS PradoP-BC    simethicone chewable tablet 80 mg, 1 tablet, Oral, QID PRN, Corinne Nielsen, AGACNP-BC    sodium chloride 0.9% flush 3 mL, 3 mL, Intravenous, PRN, Wilmar Diaz, AGACNP-BC    sodium chloride 0.9% flush 3 mL, 3 mL, Intravenous, PRN, Wilmar Diaz, ISABELLCNP-BC        Assessment:     IMPRESSION:  Bilateral Pulmonary embolism/mechanical thrombectomy  RLE DVT  Right frontal lobe mass, likely hemangioma  Liver Nodule  Suspected cholecystitis  ANNEL (resolved)  Transaminitis  Gastroparesis  HTN  Anemia      Plan:     PLAN:  Continue Xarelto 15mg BID x 21 days then 20mg  penny  Left groin precautions/activity restrictions  Patient now with IVC filter. Able to proceed with cholecystectomy without further work-up.  Patient is Low risk for MACE. RCRI 0.9%.  F/U with Dr. Victoria in 7-10 days after DC  Will sign off. Thank you for allowing us to participate in the care of this patient. Please call us with any questions.      Wilmar Diaz, [facility]-BC  Cardiology  [facility]  09/17/2022

## 2022-09-17 NOTE — PROGRESS NOTES
[facility] Hospital Medicine Progress Note        Chief Complaint: Inpatient Follow-up for PE, cholecystitis    HPI:   Nahed Harvey is a 46 y.o. White female with a past medical history of hypertension, gastroparesis and kidney donor status post left nephrectomy. The patient presented to [facility] on 9/13/2022 with a primary complaint of epigastric abdominal pain with associated nausea, dry heaving and fatigue. She reports over the last week having right lower back pain with deep inspiration. On 9/11/2022 she was going to the restroom began experiencing tunnel vision and a near syncopal episode.  She reports sliding to the floor but was unable to get up due to weakness. Patient also complains of intermittent chest tightness, shortness of breath with exertion and pain behind the right knee which began today (09/14/2022) and chronic diarrhea. She is a former smoker who quit approximately 10 years ago. She is currently on birth control. She denies recent travel, history of prior blood clots/pulmonary embolism, history of cancer and history of clotting disorder in her family. Her mom who is in her 70s recently was diagnosed with a pulmonary embolism.  Patient reports experiencing migraine headaches approximately 25 years ago which an MRI of the brain was performed revealed a meningioma.  Repeat imaging was perform 5 years later and meningioma was stable.  Her nephrologist is Dr. Saba ( has unilateral kidney, she donated her kidney to a family member in 2008 )     Upon presentation to the ED, temperature 99.5° F, heart rate 123, blood pressure 150/160 and SpO2 97% on room air.  POC ABG with pH 7.44, pCO2 18.5, PO2 77, pHCO3 12.7. Labs with H&H 10.7/30, , CO2 17, BUN/creatinine 15.1/1.16 (9.4/0.95 on 06/09/2022), AST 79, , BNP 1419, initial troponin 0.285 with repeat decreased in the 0.210.  UDS positive for cannabis.  Influenza a and B and SARS-CoV-2 PCR negative.  UA with  trace bacteria, 6 WBC, 2+ bilirubin, 1+ occult blood, trace ketones and trace protein.  Initial EKG sinus tachycardia with a ventricular rate of 117 and nonspecific ST abnormalities.  Repeat EKG with sinus tachycardia, anterior infarct and T-wave abnormalities consider inferolateral ischemia.  Chest x-ray with blunting of the left costophrenic sulcus related to trace effusion or atelectasis. CT of the head with 2.8 cm dense lesion along the right frontal lobe without mass effect or surrounding edema, malignancy unable to be excluded however findings may represent meningioma further evaluation recommended with MRI. CT chest, abdomen and pelvis with contrast revealed large bilateral PE with evidence of right heart strain, heterogenicity throughout the liver with focal hepatic nodule for which malignancy is unable to be excluded, hepatitis may be present and gallbladder thickening related to hepatic dysfunction however acute cholecystitis unable to be excluded.  V/Q scan revealed normal ventilation and perfusion.  While in ED patient was started on heparin drip and Cardiology was consulted.  Patient admitted to hospital medicine services for further medical management.    Patient was start continued on heparin drip and Cardiology recommended mechanical thrombectomy due to right heart strain.  Underwent pulmonary angiogram, thrombectomy on 09/14/2022 by Dr. Victoria.  Her respiratory status improved after thrombectomy.  Ultrasound of abdomen revealed severe gallbladder wall thickening, pericholecystic fluid and positive sonographic Ayala sign suggesting acute calculous cholecystitis.  There was a questionable nodular area of  3.3 cm inferior right hepatic lobe.  Cardiology suggested IVC filter placement due to  unstable thrombus in RLE.  Underwent IVC filter placement 09/16/2022.  She also developed mild anemia after thrombectomy and required 2 units of PRBC transfusion.  MRI brain showed 2.8cm right frontal extra-axial  mass is most suggestive of a meningioma along with chronic changes.  Patient reported following up for the known meningeal mass as outpatient and there was no concern for malignancy.  General surgery was consulted for cholecystectomy and Cardiology cleared for surgery.     Interval Hx:   She remained afebrile overnight.  Hemodynamically stable.  When Seen examined at bedside , she was alert, comfortably resting in the bed.  Denies any nausea or vomiting.  Remained in pea overnight for surgical evaluation.  Denies any chest pain.  Except for the ongoing right lower extremity pain she has no new complaints or concerns.  On further questioning she does mention pain at the site of right IJ.    She does mention getting EGD few years ago due to complaints of gastric discomfort and patient is not sure about diagnosis of gastroparesis but admits having chronic GI issues.    Objective/physical exam:  Vitals:    09/17/22 0026 09/17/22 0037 09/17/22 0320 09/17/22 0412   BP: 124/88 126/89 (!) 136/93    Pulse: 83 86 84    Resp: 16 18  18   Temp: 97.5 °F (36.4 °C) 98.4 °F (36.9 °C) 97.7 °F (36.5 °C)    TempSrc: Oral Oral Oral    SpO2: (!) 93% (!) 92% (!) 93%    Weight:       Height:         General: In no acute distress, afebrile  Respiratory: Clear to auscultation bilaterally  Cardiovascular: S1, S2, no appreciable murmur  Abdomen: Soft, minimal RUQ tenderness, murphys sign not tested, BS +  MSK: Warm, no lower extremity edema, no clubbing or cyanosis  Neurologic: Alert and oriented x4, moving all extremities with good strength     Lab Results   Component Value Date     09/17/2022    K 3.8 09/17/2022     03/12/2008    CO2 16 (L) 09/17/2022    BUN 8.9 09/17/2022    CREATININE 0.85 09/17/2022    CALCIUM 8.2 (L) 09/17/2022    EGFRNONAA >60 06/09/2022      Lab Results   Component Value Date     (H) 09/17/2022    AST 44 (H) 09/17/2022    ALKPHOS 92 09/17/2022    BILITOT 0.4 09/17/2022      Lab Results   Component  Value Date    WBC 7.4 09/17/2022    HGB 9.2 (L) 09/17/2022    HCT 28.4 (L) 09/17/2022    .6 (H) 09/17/2022     09/17/2022           Medications:   enoxaparin  70 mg Subcutaneous Q12H      sodium chloride, sodium chloride, acetaminophen, aluminum-magnesium hydroxide-simethicone, fentaNYL, heparin (porcine), HYDROmorphone, iopamidoL, LIDOcaine (PF) 20 mg/ml (2%), melatonin, midazolam, naloxone, ondansetron, polyethylene glycol, prochlorperazine, simethicone, sodium chloride 0.9%, sodium chloride 0.9%     Assessment/Plan:    Bilateral unprovoked pulmonary embolism with right heart strain s/p thrombectomy 9/14/22  Right leg acute mobile DVT s/p IVC filter 9/16/22  Acute calculous cholecystitis  2.3 cm Right liver nodule  Acute hypoxemic resp distress - improving  B/l pleural effusion  Macrocytic anemia  Acute on chronic anemia s/p blood transfusion  Transaminitis - improving  Acute kidney injury : resolved   Essential hypertension  H/O Gastroparesis   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  Awaiting General surgery evaluation for cholecystectomy .  Switched to Xarelto after surgery  -hemoglobin stable.  Monitor and transfuse when needed  -encourage incentive spirometer use, ambulation as tolerated. Will repeat 2 view xray later.  - will benefit from outpatient follow up with liver MRI for nodule.  AFP within normal limits  -continue current analgesic regimen   -educated patient regarding OCP side effects.  - labs for Am  - counseled to quit using marijuana    Full code    Javon Hernandez MD

## 2022-09-17 NOTE — PLAN OF CARE
Problem: Adult Inpatient Plan of Care  Goal: Plan of Care Review  Outcome: Ongoing, Progressing  Flowsheets (Taken 9/17/2022 0245)  Plan of Care Reviewed With: patient  Goal: Patient-Specific Goal (Individualized)  Outcome: Ongoing, Progressing  Goal: Absence of Hospital-Acquired Illness or Injury  Outcome: Ongoing, Progressing  Intervention: Identify and Manage Fall Risk  Flowsheets (Taken 9/17/2022 0245)  Safety Promotion/Fall Prevention:   assistive device/personal item within reach   nonskid shoes/socks when out of bed   side rails raised x 2  Intervention: Prevent Skin Injury  Flowsheets (Taken 9/17/2022 0245)  Body Position: position changed independently  Intervention: Prevent and Manage VTE (Venous Thromboembolism) Risk  Flowsheets (Taken 9/17/2022 0245)  VTE Prevention/Management:   bleeding risk assessed   bleeding precations maintained  Intervention: Prevent Infection  Flowsheets (Taken 9/17/2022 0245)  Infection Prevention:   hand hygiene promoted   single patient room provided   rest/sleep promoted  Goal: Optimal Comfort and Wellbeing  Outcome: Ongoing, Progressing  Intervention: Monitor Pain and Promote Comfort  Flowsheets (Taken 9/17/2022 0245)  Pain Management Interventions:   care clustered   pillow support provided   quiet environment facilitated  Goal: Readiness for Transition of Care  Outcome: Ongoing, Progressing     Problem: Fall Injury Risk  Goal: Absence of Fall and Fall-Related Injury  Outcome: Ongoing, Progressing  Intervention: Promote Injury-Free Environment  Flowsheets (Taken 9/17/2022 0245)  Safety Promotion/Fall Prevention:   assistive device/personal item within reach   nonskid shoes/socks when out of bed   side rails raised x 2     Problem: Skin Injury Risk Increased  Goal: Skin Health and Integrity  Outcome: Ongoing, Progressing

## 2022-09-18 LAB
ALBUMIN SERPL-MCNC: 2.2 GM/DL (ref 3.5–5)
ALBUMIN/GLOB SERPL: 0.7 RATIO (ref 1.1–2)
ALP SERPL-CCNC: 86 UNIT/L (ref 40–150)
ALT SERPL-CCNC: 72 UNIT/L (ref 0–55)
AST SERPL-CCNC: 23 UNIT/L (ref 5–34)
BASOPHILS # BLD AUTO: 0.04 X10(3)/MCL (ref 0–0.2)
BASOPHILS NFR BLD AUTO: 0.5 %
BILIRUBIN DIRECT+TOT PNL SERPL-MCNC: 0.4 MG/DL
BUN SERPL-MCNC: 6.4 MG/DL (ref 7–18.7)
CALCIUM SERPL-MCNC: 8.2 MG/DL (ref 8.4–10.2)
CHLORIDE SERPL-SCNC: 109 MMOL/L (ref 98–107)
CO2 SERPL-SCNC: 17 MMOL/L (ref 22–29)
CREAT SERPL-MCNC: 0.82 MG/DL (ref 0.55–1.02)
DEPRECATED CALCIDIOL+CALCIFEROL SERPL-MC: 34.8 NG/ML (ref 30–80)
EOSINOPHIL # BLD AUTO: 0.07 X10(3)/MCL (ref 0–0.9)
EOSINOPHIL NFR BLD AUTO: 0.9 %
ERYTHROCYTE [DISTWIDTH] IN BLOOD BY AUTOMATED COUNT: 20.7 % (ref 11.5–17)
FERRITIN SERPL-MCNC: 493.75 NG/ML (ref 4.63–204)
FOLATE SERPL-MCNC: <2.2 NG/ML (ref 7–31.4)
GFR SERPLBLD CREATININE-BSD FMLA CKD-EPI: >60 MLS/MIN/1.73/M2
GLOBULIN SER-MCNC: 3.1 GM/DL (ref 2.4–3.5)
GLUCOSE SERPL-MCNC: 94 MG/DL (ref 74–100)
HCT VFR BLD AUTO: 28.6 % (ref 37–47)
HGB BLD-MCNC: 9.8 GM/DL (ref 12–16)
IMM GRANULOCYTES # BLD AUTO: 0.03 X10(3)/MCL (ref 0–0.04)
IMM GRANULOCYTES NFR BLD AUTO: 0.4 %
LYMPHOCYTES # BLD AUTO: 2.65 X10(3)/MCL (ref 0.6–4.6)
LYMPHOCYTES NFR BLD AUTO: 35.1 %
MAGNESIUM SERPL-MCNC: 1.8 MG/DL (ref 1.6–2.6)
MCH RBC QN AUTO: 36.8 PG (ref 27–31)
MCHC RBC AUTO-ENTMCNC: 34.3 MG/DL (ref 33–36)
MCV RBC AUTO: 107.5 FL (ref 80–94)
MONOCYTES # BLD AUTO: 0.51 X10(3)/MCL (ref 0.1–1.3)
MONOCYTES NFR BLD AUTO: 6.8 %
NEUTROPHILS # BLD AUTO: 4.2 X10(3)/MCL (ref 2.1–9.2)
NEUTROPHILS NFR BLD AUTO: 56.3 %
NRBC BLD AUTO-RTO: 0 %
PLATELET # BLD AUTO: 221 X10(3)/MCL (ref 130–400)
PMV BLD AUTO: 10.5 FL (ref 7.4–10.4)
POTASSIUM SERPL-SCNC: 3.4 MMOL/L (ref 3.5–5.1)
PROT SERPL-MCNC: 5.3 GM/DL (ref 6.4–8.3)
RBC # BLD AUTO: 2.66 X10(6)/MCL (ref 4.2–5.4)
SODIUM SERPL-SCNC: 134 MMOL/L (ref 136–145)
TSH SERPL-ACNC: 2.58 UIU/ML (ref 0.35–4.94)
URATE SERPL-MCNC: 7.2 MG/DL (ref 2.6–6)
VIT B12 SERPL-MCNC: 298 PG/ML (ref 213–816)
WBC # SPEC AUTO: 7.5 X10(3)/MCL (ref 4.5–11.5)

## 2022-09-18 PROCEDURE — 83735 ASSAY OF MAGNESIUM: CPT | Performed by: INTERNAL MEDICINE

## 2022-09-18 PROCEDURE — 82728 ASSAY OF FERRITIN: CPT | Performed by: INTERNAL MEDICINE

## 2022-09-18 PROCEDURE — 21400001 HC TELEMETRY ROOM

## 2022-09-18 PROCEDURE — 25000003 PHARM REV CODE 250: Performed by: INTERNAL MEDICINE

## 2022-09-18 PROCEDURE — 11000001 HC ACUTE MED/SURG PRIVATE ROOM

## 2022-09-18 PROCEDURE — 63600175 PHARM REV CODE 636 W HCPCS: Performed by: INTERNAL MEDICINE

## 2022-09-18 PROCEDURE — 82306 VITAMIN D 25 HYDROXY: CPT | Performed by: INTERNAL MEDICINE

## 2022-09-18 PROCEDURE — 25000003 PHARM REV CODE 250: Performed by: PHYSICIAN ASSISTANT

## 2022-09-18 PROCEDURE — 82607 VITAMIN B-12: CPT | Performed by: INTERNAL MEDICINE

## 2022-09-18 PROCEDURE — 82746 ASSAY OF FOLIC ACID SERUM: CPT | Performed by: INTERNAL MEDICINE

## 2022-09-18 PROCEDURE — 84443 ASSAY THYROID STIM HORMONE: CPT | Performed by: INTERNAL MEDICINE

## 2022-09-18 PROCEDURE — 84550 ASSAY OF BLOOD/URIC ACID: CPT | Performed by: INTERNAL MEDICINE

## 2022-09-18 PROCEDURE — 63600175 PHARM REV CODE 636 W HCPCS: Performed by: NURSE PRACTITIONER

## 2022-09-18 PROCEDURE — 36415 COLL VENOUS BLD VENIPUNCTURE: CPT | Performed by: INTERNAL MEDICINE

## 2022-09-18 PROCEDURE — 80053 COMPREHEN METABOLIC PANEL: CPT | Performed by: INTERNAL MEDICINE

## 2022-09-18 PROCEDURE — 85025 COMPLETE CBC W/AUTO DIFF WBC: CPT | Performed by: INTERNAL MEDICINE

## 2022-09-18 RX ORDER — HYDRALAZINE HYDROCHLORIDE 25 MG/1
25 TABLET, FILM COATED ORAL EVERY 8 HOURS
Status: DISCONTINUED | OUTPATIENT
Start: 2022-09-18 | End: 2022-09-21 | Stop reason: HOSPADM

## 2022-09-18 RX ORDER — POTASSIUM CHLORIDE 20 MEQ/1
20 TABLET, EXTENDED RELEASE ORAL ONCE
Status: COMPLETED | OUTPATIENT
Start: 2022-09-18 | End: 2022-09-18

## 2022-09-18 RX ORDER — HYDROXYZINE HYDROCHLORIDE 25 MG/1
25 TABLET, FILM COATED ORAL 3 TIMES DAILY PRN
Status: DISCONTINUED | OUTPATIENT
Start: 2022-09-18 | End: 2022-09-21 | Stop reason: HOSPADM

## 2022-09-18 RX ORDER — FOLIC ACID 1 MG/1
1 TABLET ORAL DAILY
Status: DISCONTINUED | OUTPATIENT
Start: 2022-09-18 | End: 2022-09-21 | Stop reason: HOSPADM

## 2022-09-18 RX ORDER — METOPROLOL SUCCINATE 50 MG/1
50 TABLET, EXTENDED RELEASE ORAL DAILY
Status: DISCONTINUED | OUTPATIENT
Start: 2022-09-18 | End: 2022-09-21 | Stop reason: HOSPADM

## 2022-09-18 RX ADMIN — ENOXAPARIN SODIUM 70 MG: 80 INJECTION SUBCUTANEOUS at 08:09

## 2022-09-18 RX ADMIN — SODIUM CHLORIDE: 9 INJECTION, SOLUTION INTRAVENOUS at 01:09

## 2022-09-18 RX ADMIN — ENOXAPARIN SODIUM 70 MG: 80 INJECTION SUBCUTANEOUS at 07:09

## 2022-09-18 RX ADMIN — HYDRALAZINE HYDROCHLORIDE 25 MG: 25 TABLET, FILM COATED ORAL at 08:09

## 2022-09-18 RX ADMIN — BUPROPION HYDROCHLORIDE 300 MG: 150 TABLET, FILM COATED, EXTENDED RELEASE ORAL at 09:09

## 2022-09-18 RX ADMIN — HYDRALAZINE HYDROCHLORIDE 25 MG: 25 TABLET, FILM COATED ORAL at 01:09

## 2022-09-18 RX ADMIN — ONDANSETRON 4 MG: 2 INJECTION INTRAMUSCULAR; INTRAVENOUS at 11:09

## 2022-09-18 RX ADMIN — METOPROLOL SUCCINATE 50 MG: 50 TABLET, EXTENDED RELEASE ORAL at 08:09

## 2022-09-18 RX ADMIN — POTASSIUM CHLORIDE 20 MEQ: 1500 TABLET, EXTENDED RELEASE ORAL at 08:09

## 2022-09-18 RX ADMIN — PANTOPRAZOLE SODIUM 40 MG: 40 TABLET, DELAYED RELEASE ORAL at 08:09

## 2022-09-18 RX ADMIN — HYDROMORPHONE HYDROCHLORIDE 0.5 MG: 2 INJECTION INTRAMUSCULAR; INTRAVENOUS; SUBCUTANEOUS at 09:09

## 2022-09-18 RX ADMIN — FOLIC ACID 1 MG: 1 TABLET ORAL at 11:09

## 2022-09-18 RX ADMIN — HYDRALAZINE HYDROCHLORIDE 25 MG: 25 TABLET, FILM COATED ORAL at 09:09

## 2022-09-18 RX ADMIN — HYDROMORPHONE HYDROCHLORIDE 0.5 MG: 2 INJECTION INTRAMUSCULAR; INTRAVENOUS; SUBCUTANEOUS at 11:09

## 2022-09-18 RX ADMIN — HYDROMORPHONE HYDROCHLORIDE 0.5 MG: 2 INJECTION INTRAMUSCULAR; INTRAVENOUS; SUBCUTANEOUS at 04:09

## 2022-09-18 NOTE — PLAN OF CARE
Problem: Adult Inpatient Plan of Care  Goal: Plan of Care Review  Outcome: Ongoing, Progressing  Flowsheets (Taken 9/18/2022 0842)  Plan of Care Reviewed With: patient  Goal: Patient-Specific Goal (Individualized)  Outcome: Ongoing, Progressing  Goal: Absence of Hospital-Acquired Illness or Injury  Outcome: Ongoing, Progressing  Intervention: Identify and Manage Fall Risk  Flowsheets (Taken 9/18/2022 0842)  Safety Promotion/Fall Prevention: assistive device/personal item within reach  Intervention: Prevent Skin Injury  Flowsheets (Taken 9/18/2022 0842)  Body Position: position changed independently  Skin Protection: adhesive use limited  Intervention: Prevent and Manage VTE (Venous Thromboembolism) Risk  Flowsheets (Taken 9/18/2022 0842)  Activity Management:   Up to bedside commode - L3   Ambulated in room - L4  VTE Prevention/Management:   ROM (active) performed   ROM (passive) performed   intravenous hydration   fluids promoted  Range of Motion:   active ROM (range of motion) encouraged   ROM (range of motion) performed  Intervention: Prevent Infection  Flowsheets (Taken 9/18/2022 0842)  Infection Prevention:   single patient room provided   hand hygiene promoted  Goal: Optimal Comfort and Wellbeing  Outcome: Ongoing, Progressing  Intervention: Monitor Pain and Promote Comfort  Flowsheets (Taken 9/18/2022 0842)  Pain Management Interventions:   medication offered   pillow support provided  Intervention: Provide Person-Centered Care  Flowsheets (Taken 9/18/2022 0842)  Trust Relationship/Rapport:   care explained   questions encouraged   choices provided   reassurance provided   emotional support provided   thoughts/feelings acknowledged   empathic listening provided   questions answered  Goal: Readiness for Transition of Care  Outcome: Ongoing, Progressing     Problem: Fall Injury Risk  Goal: Absence of Fall and Fall-Related Injury  Outcome: Ongoing, Progressing     Problem: Skin Injury Risk Increased  Goal:  Skin Health and Integrity  Outcome: Ongoing, Progressing  Intervention: Optimize Skin Protection  Flowsheets (Taken 9/18/2022 4343)  Skin Protection: adhesive use limited  Head of Bed (HOB) Positioning: HOB at 30-45 degrees

## 2022-09-18 NOTE — PROGRESS NOTES
[facility] Hospital Medicine Progress Note        Chief Complaint: Inpatient Follow-up for PE, cholecystitis    HPI:   Nahed Harvey is a 46 y.o. White female with a past medical history of hypertension, gastroparesis and kidney donor status post left nephrectomy. The patient presented to [facility] on 9/13/2022 with a primary complaint of epigastric abdominal pain with associated nausea, dry heaving and fatigue. She reports over the last week having right lower back pain with deep inspiration. On 9/11/2022 she was going to the restroom began experiencing tunnel vision and a near syncopal episode.  She reports sliding to the floor but was unable to get up due to weakness. Patient also complains of intermittent chest tightness, shortness of breath with exertion and pain behind the right knee which began today (09/14/2022) and chronic diarrhea. She is a former smoker who quit approximately 10 years ago. She is currently on birth control. She denies recent travel, history of prior blood clots/pulmonary embolism, history of cancer and history of clotting disorder in her family. Her mom who is in her 70s recently was diagnosed with a pulmonary embolism.  Patient reports experiencing migraine headaches approximately 25 years ago which an MRI of the brain was performed revealed a meningioma.  Repeat imaging was perform 5 years later and meningioma was stable.  Her nephrologist is Dr. Saba ( has unilateral kidney, she donated her kidney to a family member in 2008 )     Upon presentation to the ED, temperature 99.5° F, heart rate 123, blood pressure 150/160 and SpO2 97% on room air.  POC ABG with pH 7.44, pCO2 18.5, PO2 77, pHCO3 12.7. Labs with H&H 10.7/30, , CO2 17, BUN/creatinine 15.1/1.16 (9.4/0.95 on 06/09/2022), AST 79, , BNP 1419, initial troponin 0.285 with repeat decreased in the 0.210.  UDS positive for cannabis.  Influenza a and B and SARS-CoV-2 PCR negative.  UA with  trace bacteria, 6 WBC, 2+ bilirubin, 1+ occult blood, trace ketones and trace protein.  Initial EKG sinus tachycardia with a ventricular rate of 117 and nonspecific ST abnormalities.  Repeat EKG with sinus tachycardia, anterior infarct and T-wave abnormalities consider inferolateral ischemia.  Chest x-ray with blunting of the left costophrenic sulcus related to trace effusion or atelectasis. CT of the head with 2.8 cm dense lesion along the right frontal lobe without mass effect or surrounding edema, malignancy unable to be excluded however findings may represent meningioma further evaluation recommended with MRI. CT chest, abdomen and pelvis with contrast revealed large bilateral PE with evidence of right heart strain, heterogenicity throughout the liver with focal hepatic nodule for which malignancy is unable to be excluded, hepatitis may be present and gallbladder thickening related to hepatic dysfunction however acute cholecystitis unable to be excluded.  V/Q scan revealed normal ventilation and perfusion.  While in ED patient was started on heparin drip and Cardiology was consulted.  Patient admitted to hospital medicine services for further medical management.    Patient was start continued on heparin drip and Cardiology recommended mechanical thrombectomy due to right heart strain.  Underwent pulmonary angiogram, thrombectomy on 09/14/2022 by Dr. Victoria.  Her respiratory status improved after thrombectomy.  Ultrasound of abdomen revealed severe gallbladder wall thickening, pericholecystic fluid and positive sonographic Ayala sign suggesting acute calculous cholecystitis.  There was a questionable nodular area of  3.3 cm inferior right hepatic lobe.  Cardiology suggested IVC filter placement due to  unstable thrombus in RLE.  Underwent IVC filter placement 09/16/2022.  She also developed mild anemia after thrombectomy and required 2 units of PRBC transfusion.  MRI brain showed 2.8cm right frontal extra-axial  mass is most suggestive of a meningioma along with chronic changes.  Patient reported following up for the known meningeal mass as outpatient and there was no concern for malignancy.  General surgery was consulted for cholecystectomy and Cardiology cleared for surgery.     Interval Hx:     Remained afebrile overnight.  Blood pressure accelerated this morning.  When seen examined at bedside she was alert, appeared anxious and reports minimal shortness of breath.  Continues to complain right lower extremity pain.  Not ambulating much.  Denies any nausea or vomiting.  Right upper abdominal soreness continues.    Labs revealing stable hemoglobin and platelets, folate deficiency normal B12.  Mild hyponatremia and mild hypokalemia with metabolic acidosis noted.    Objective/physical exam:  Vitals:    09/17/22 2003 09/17/22 2355 09/18/22 0252 09/18/22 0727   BP:  (!) 157/96 (!) 146/98 (!) 153/108   Pulse:  96 85 84   Resp: 18 18 18 18   Temp:  97.3 °F (36.3 °C) 97.4 °F (36.3 °C) 98.1 °F (36.7 °C)   TempSrc:  Oral Oral Oral   SpO2:  97% (!) 92% 97%   Weight:       Height:         General: In no acute distress, afebrile  Respiratory: Clear to auscultation bilaterally  Cardiovascular: S1, S2, no appreciable murmur  Abdomen: Soft, minimal RUQ tenderness, murphys sign not tested, BS +  MSK: Warm, no lower extremity edema, no clubbing or cyanosis  Neurologic: Alert and oriented x4, moving all extremities with good strength     Lab Results   Component Value Date     (L) 09/18/2022    K 3.4 (L) 09/18/2022     03/12/2008    CO2 17 (L) 09/18/2022    BUN 6.4 (L) 09/18/2022    CREATININE 0.82 09/18/2022    CALCIUM 8.2 (L) 09/18/2022    EGFRNONAA >60 06/09/2022      Lab Results   Component Value Date    ALT 72 (H) 09/18/2022    AST 23 09/18/2022    ALKPHOS 86 09/18/2022    BILITOT 0.4 09/18/2022      Lab Results   Component Value Date    WBC 7.5 09/18/2022    HGB 9.8 (L) 09/18/2022    HCT 28.6 (L) 09/18/2022    .5  (H) 09/18/2022     09/18/2022           Medications:   buPROPion  300 mg Oral Daily    enoxaparin  70 mg Subcutaneous Q12H    hydrALAZINE  25 mg Oral Q8H    metoprolol succinate  50 mg Oral Daily    pantoprazole  40 mg Oral Daily    potassium chloride  20 mEq Oral Once      sodium chloride, sodium chloride, acetaminophen, aluminum-magnesium hydroxide-simethicone, calcium carbonate, fentaNYL, heparin (porcine), HYDROmorphone, iopamidoL, LIDOcaine (PF) 20 mg/ml (2%), melatonin, midazolam, naloxone, ondansetron, polyethylene glycol, prochlorperazine, simethicone, sodium chloride 0.9%, sodium chloride 0.9%     Assessment/Plan:    Bilateral unprovoked pulmonary embolism with right heart strain s/p thrombectomy 9/14/22  Right leg acute mobile DVT s/p IVC filter 9/16/22  Acute calculous cholecystitis  2.3 cm Right liver nodule  Acute hypoxemic resp distress - improving  B/l pleural effusion  Macrocytic anemia  Acute on chronic anemia s/p blood transfusion  Transaminitis - improving  Acute kidney injury : resolved   Essential hypertension  H/O Gastroparesis   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  Awaiting General surgery evaluation for cholecystectomy .  Switched to Xarelto after surgery  -hemoglobin stable.  Monitor and transfuse when needed  -encourage incentive spirometer use, ambulation as tolerated. Will repeat 2 view xray later.  - will benefit from outpatient follow up with liver MRI for nodule.  AFP within normal limits  - resume hydralazine and Metoprolol at reduced doses. Add hydroxyzine to help with anxiety  -continue current analgesic regimen   -educated patient regarding OCP side effects.  - counseled to quit using marijuana     Full code  Labs    Javon Hernandez MD

## 2022-09-18 NOTE — PLAN OF CARE
Problem: Adult Inpatient Plan of Care  Goal: Plan of Care Review  Outcome: Ongoing, Progressing  Goal: Patient-Specific Goal (Individualized)  Outcome: Ongoing, Progressing  Goal: Absence of Hospital-Acquired Illness or Injury  Outcome: Ongoing, Progressing  Intervention: Identify and Manage Fall Risk  Flowsheets (Taken 9/18/2022 1229)  Safety Promotion/Fall Prevention:   assistive device/personal item within reach   commode/urinal/bedpan at bedside   nonskid shoes/socks when out of bed   side rails raised x 2  Intervention: Prevent Skin Injury  Flowsheets (Taken 9/18/2022 1229)  Body Position:   position changed independently   sitting up in bed  Intervention: Prevent and Manage VTE (Venous Thromboembolism) Risk  Flowsheets (Taken 9/18/2022 1229)  Activity Management:   Ambulated in room - L4   Up to bedside commode - L3  Goal: Optimal Comfort and Wellbeing  Outcome: Ongoing, Progressing  Intervention: Provide Person-Centered Care  Flowsheets (Taken 9/18/2022 1229)  Trust Relationship/Rapport:   emotional support provided   empathic listening provided   questions answered   thoughts/feelings acknowledged  Goal: Readiness for Transition of Care  Outcome: Ongoing, Progressing     Problem: Fall Injury Risk  Goal: Absence of Fall and Fall-Related Injury  Outcome: Ongoing, Progressing     Problem: Skin Injury Risk Increased  Goal: Skin Health and Integrity  Outcome: Ongoing, Progressing

## 2022-09-19 ENCOUNTER — APPOINTMENT (OUTPATIENT)
Dept: RADIOLOGY | Facility: HOSPITAL | Age: 47
End: 2022-09-19

## 2022-09-19 LAB
ALBUMIN SERPL-MCNC: 2.4 GM/DL (ref 3.5–5)
ALBUMIN/GLOB SERPL: 0.8 RATIO (ref 1.1–2)
ALP SERPL-CCNC: 83 UNIT/L (ref 40–150)
ALT SERPL-CCNC: 48 UNIT/L (ref 0–55)
AST SERPL-CCNC: 16 UNIT/L (ref 5–34)
BACTERIA BLD CULT: NORMAL
BACTERIA BLD CULT: NORMAL
BASOPHILS # BLD AUTO: 0.06 X10(3)/MCL (ref 0–0.2)
BASOPHILS NFR BLD AUTO: 0.7 %
BILIRUBIN DIRECT+TOT PNL SERPL-MCNC: 0.3 MG/DL
BUN SERPL-MCNC: 5.4 MG/DL (ref 7–18.7)
CALCIUM SERPL-MCNC: 8.6 MG/DL (ref 8.4–10.2)
CHLORIDE SERPL-SCNC: 111 MMOL/L (ref 98–107)
CO2 SERPL-SCNC: 16 MMOL/L (ref 22–29)
CREAT SERPL-MCNC: 0.81 MG/DL (ref 0.55–1.02)
EOSINOPHIL # BLD AUTO: 0.1 X10(3)/MCL (ref 0–0.9)
EOSINOPHIL NFR BLD AUTO: 1.2 %
ERYTHROCYTE [DISTWIDTH] IN BLOOD BY AUTOMATED COUNT: 21.1 % (ref 11.5–17)
GFR SERPLBLD CREATININE-BSD FMLA CKD-EPI: >60 MLS/MIN/1.73/M2
GLOBULIN SER-MCNC: 3 GM/DL (ref 2.4–3.5)
GLUCOSE SERPL-MCNC: 87 MG/DL (ref 74–100)
HCT VFR BLD AUTO: 28.3 % (ref 37–47)
HGB BLD-MCNC: 9.4 GM/DL (ref 12–16)
IMM GRANULOCYTES # BLD AUTO: 0.03 X10(3)/MCL (ref 0–0.04)
IMM GRANULOCYTES NFR BLD AUTO: 0.4 %
LYMPHOCYTES # BLD AUTO: 2.74 X10(3)/MCL (ref 0.6–4.6)
LYMPHOCYTES NFR BLD AUTO: 34 %
MAGNESIUM SERPL-MCNC: 1.7 MG/DL (ref 1.6–2.6)
MCH RBC QN AUTO: 36.9 PG (ref 27–31)
MCHC RBC AUTO-ENTMCNC: 33.2 MG/DL (ref 33–36)
MCV RBC AUTO: 111 FL (ref 80–94)
MONOCYTES # BLD AUTO: 0.6 X10(3)/MCL (ref 0.1–1.3)
MONOCYTES NFR BLD AUTO: 7.5 %
NEUTROPHILS # BLD AUTO: 4.5 X10(3)/MCL (ref 2.1–9.2)
NEUTROPHILS NFR BLD AUTO: 56.2 %
NRBC BLD AUTO-RTO: 0 %
PLATELET # BLD AUTO: 245 X10(3)/MCL (ref 130–400)
PMV BLD AUTO: 10.5 FL (ref 7.4–10.4)
POTASSIUM SERPL-SCNC: 3.9 MMOL/L (ref 3.5–5.1)
PROT SERPL-MCNC: 5.4 GM/DL (ref 6.4–8.3)
RBC # BLD AUTO: 2.55 X10(6)/MCL (ref 4.2–5.4)
SODIUM SERPL-SCNC: 136 MMOL/L (ref 136–145)
WBC # SPEC AUTO: 8.1 X10(3)/MCL (ref 4.5–11.5)

## 2022-09-19 PROCEDURE — 36415 COLL VENOUS BLD VENIPUNCTURE: CPT | Performed by: INTERNAL MEDICINE

## 2022-09-19 PROCEDURE — 83735 ASSAY OF MAGNESIUM: CPT | Performed by: INTERNAL MEDICINE

## 2022-09-19 PROCEDURE — 63600175 PHARM REV CODE 636 W HCPCS: Performed by: INTERNAL MEDICINE

## 2022-09-19 PROCEDURE — 25000003 PHARM REV CODE 250: Performed by: PHYSICIAN ASSISTANT

## 2022-09-19 PROCEDURE — 85025 COMPLETE CBC W/AUTO DIFF WBC: CPT | Performed by: INTERNAL MEDICINE

## 2022-09-19 PROCEDURE — 25000003 PHARM REV CODE 250: Performed by: INTERNAL MEDICINE

## 2022-09-19 PROCEDURE — 63600175 PHARM REV CODE 636 W HCPCS: Performed by: NURSE PRACTITIONER

## 2022-09-19 PROCEDURE — 21400001 HC TELEMETRY ROOM

## 2022-09-19 PROCEDURE — 78226 HEPATOBILIARY SYSTEM IMAGING: CPT | Mod: TC

## 2022-09-19 PROCEDURE — 80053 COMPREHEN METABOLIC PANEL: CPT | Performed by: INTERNAL MEDICINE

## 2022-09-19 PROCEDURE — A9537 TC99M MEBROFENIN: HCPCS

## 2022-09-19 RX ORDER — SODIUM BICARBONATE 650 MG/1
650 TABLET ORAL 2 TIMES DAILY
Status: DISCONTINUED | OUTPATIENT
Start: 2022-09-19 | End: 2022-09-21 | Stop reason: HOSPADM

## 2022-09-19 RX ADMIN — SODIUM BICARBONATE 650 MG TABLET 650 MG: at 11:09

## 2022-09-19 RX ADMIN — BUPROPION HYDROCHLORIDE 300 MG: 150 TABLET, FILM COATED, EXTENDED RELEASE ORAL at 11:09

## 2022-09-19 RX ADMIN — METOPROLOL SUCCINATE 50 MG: 50 TABLET, EXTENDED RELEASE ORAL at 11:09

## 2022-09-19 RX ADMIN — HYDROMORPHONE HYDROCHLORIDE 0.5 MG: 2 INJECTION INTRAMUSCULAR; INTRAVENOUS; SUBCUTANEOUS at 09:09

## 2022-09-19 RX ADMIN — ENOXAPARIN SODIUM 70 MG: 80 INJECTION SUBCUTANEOUS at 08:09

## 2022-09-19 RX ADMIN — HYDROMORPHONE HYDROCHLORIDE 0.5 MG: 2 INJECTION INTRAMUSCULAR; INTRAVENOUS; SUBCUTANEOUS at 03:09

## 2022-09-19 RX ADMIN — HYDRALAZINE HYDROCHLORIDE 25 MG: 25 TABLET, FILM COATED ORAL at 02:09

## 2022-09-19 RX ADMIN — SODIUM BICARBONATE 650 MG TABLET 650 MG: at 09:09

## 2022-09-19 RX ADMIN — SODIUM CHLORIDE: 9 INJECTION, SOLUTION INTRAVENOUS at 06:09

## 2022-09-19 RX ADMIN — ONDANSETRON 4 MG: 2 INJECTION INTRAMUSCULAR; INTRAVENOUS at 09:09

## 2022-09-19 RX ADMIN — FOLIC ACID 1 MG: 1 TABLET ORAL at 11:09

## 2022-09-19 RX ADMIN — RIVAROXABAN 15 MG: 15 TABLET, FILM COATED ORAL at 06:09

## 2022-09-19 RX ADMIN — HYDRALAZINE HYDROCHLORIDE 25 MG: 25 TABLET, FILM COATED ORAL at 09:09

## 2022-09-19 RX ADMIN — PANTOPRAZOLE SODIUM 40 MG: 40 TABLET, DELAYED RELEASE ORAL at 11:09

## 2022-09-19 NOTE — CONSULTS
Acute Care Surgery  Consult Note        Subjective:     HPI:   Ms. Harvey is a 45 yo woman with past medical history of HTN, gastroparesis, L nephrectomy (kidney donor) and stable meningioma that was diagnosed 25 years ago and has since been stable who presented on 9/13 with intermittent epigastric/RUQ pain that has been present for 5-6 years that acute worsened that morning. States she had severe SOB which she contributed to her abdominal symptoms; however, on further work-up in the ED, she was noted to have large bilateral pulmonary emboli with right heart strain. She was admitted to the medicine service for further management.  On 9/14, she underwent a pulmonary angiography with bilateral thrombectomy with Cardiology. A venous doppler ultrasound of BLE was performed on the same day and was significant for a thrombus in the right common femoral vein. She was started on Xarelto BID and on 9/16, underwent IVC filter placement. Following the procedure, Xarelto was stopped and she has since been transitioned to Lovenox 70 mg BID.     Of note, she has been afebrile since admission with a normal WBC. Total bilirubin is 0.8. Abdominal ultrasound demonstrates severe gallbladder wall thickening, pericholecystic fluid and positive Ayala sign, which could indicate acalculous cholecystitis.     Interval History:   NAERON. Afebrile overnight, vital signs stable, WBC and bilirubin wnl. Admits to RUQ abdominal pain, regular bowel movements, and 1x episode of nausea. Denies any vomiting, fevers, chills, SOB. HIDA scan to be done this AM.       PMH:  Past Medical History:   Diagnosis Date    Gastroparesis     Hypertension          PSH:  Past Surgical History:   Procedure Laterality Date    NEPHRECTOMY           Medications:  No current facility-administered medications on file prior to encounter.     Current Outpatient Medications on File Prior to Encounter   Medication Sig Dispense Refill    allopurinoL (ZYLOPRIM) 100 MG  tablet Take 100 mg by mouth once daily.      buPROPion (WELLBUTRIN XL) 300 MG 24 hr tablet Take 300 mg by mouth once daily.      furosemide (LASIX) 20 MG tablet Take 20 mg by mouth once daily.      hydrALAZINE (APRESOLINE) 50 MG tablet Take 50 mg by mouth 3 (three) times daily.      levonorgestrel-ethinyl estradiol (ENPRESSE) 50-30 (6)/75-40 (5)/125-30(10) per tablet Take 1 tablet by mouth once daily.      MAGNESIUM ORAL Take by mouth once.      metoprolol succinate (TOPROL-XL) 50 MG 24 hr tablet Take 75 mg by mouth once daily.          Allergies:  Review of patient's allergies indicates:  No Known Allergies      Social Hx:  Social History     Tobacco Use   Smoking Status Not on file   Smokeless Tobacco Not on file      Social History     Substance and Sexual Activity   Alcohol Use Not on file         Relevant Family Hx:  No family history on file.      Objective:     Vitals:  BP: 129-159/   Pulse: 76-86   Resp: 14-18   Temp: 97.4-99.1 F      Physical Exam:  Gen: NAD  Neuro: awake, alert, answering questions appropriately  CV: RRR  Resp: non-labored breathing, MONIQUE  Abd: soft, ND. Mild tenderness with deep palpation to RUQ. No rebound or guarding.   Ext: moves all 4 spontaneously and purposefully  Skin: warm, well perfused     Labs:  WBC 78.1  H/H 9.4/28.3  Plt 245  K 3.9  BUN/Cr 5.4/0.81  Alb 2.4  Total bilirubin 0.3  AST 16, ALT 48     Imagin/14, Abdominal ultrasound:  Severe gallbladder wall thickening, pericholecystic fluid, and positive sonographic Ayala sign.  Findings could indicate a calculus cholecystitis.  Correlation with clinical findings recommended.    22, HIDA scan   Negative hepatobiliary scintigraphy with normal gallbladder ejection fraction.     Assessment/Plan:  45 yo F with history of meningioma, left nephrectomy (kidney donor) who presented on  with chronic EG/RUQ pain that acutely worsened with SOB. Found to have bilateral pulmonary emboli as well as RLE DVT, now s/p pulm  angio with bilateral thrombectomy on 9/14 and IVC filter placement on 9/16. Surgery consulted for possible cholecystitis.    - Patient is afebrile, HDS; WBC and total bilirubin have been WNL throughout admission  - HIDA scan performed this morning - negative hepatobiliary scintigraphy with a normal EF   - No need for surgical intervention needed at this time  - Surgery is signing off; please reconsult if any changes occur.         Chico Willis MD   [facility]  09/19/2022 8:44 PM

## 2022-09-19 NOTE — PROGRESS NOTES
[facility] Hospital Medicine Progress Note        Chief Complaint: Inpatient Follow-up for PE, cholecystitis    HPI:   Nahed Harvey is a 46 y.o. White female with a past medical history of hypertension, gastroparesis and kidney donor status post left nephrectomy. The patient presented to [facility] on 9/13/2022 with a primary complaint of epigastric abdominal pain with associated nausea, dry heaving and fatigue. She reports over the last week having right lower back pain with deep inspiration. On 9/11/2022 she was going to the restroom began experiencing tunnel vision and a near syncopal episode.  She reports sliding to the floor but was unable to get up due to weakness. Patient also complains of intermittent chest tightness, shortness of breath with exertion and pain behind the right knee which began today (09/14/2022) and chronic diarrhea. She is a former smoker who quit approximately 10 years ago. She is currently on birth control. She denies recent travel, history of prior blood clots/pulmonary embolism, history of cancer and history of clotting disorder in her family. Her mom who is in her 70s recently was diagnosed with a pulmonary embolism.  Patient reports experiencing migraine headaches approximately 25 years ago which an MRI of the brain was performed revealed a meningioma.  Repeat imaging was perform 5 years later and meningioma was stable.  Her nephrologist is Dr. Saba ( has unilateral kidney, she donated her kidney to a family member in 2008 )     Upon presentation to the ED, temperature 99.5° F, heart rate 123, blood pressure 150/160 and SpO2 97% on room air.  POC ABG with pH 7.44, pCO2 18.5, PO2 77, pHCO3 12.7. Labs with H&H 10.7/30, , CO2 17, BUN/creatinine 15.1/1.16 (9.4/0.95 on 06/09/2022), AST 79, , BNP 1419, initial troponin 0.285 with repeat decreased in the 0.210.  UDS positive for cannabis.  Influenza a and B and SARS-CoV-2 PCR negative.  UA with  trace bacteria, 6 WBC, 2+ bilirubin, 1+ occult blood, trace ketones and trace protein.  Initial EKG sinus tachycardia with a ventricular rate of 117 and nonspecific ST abnormalities.  Repeat EKG with sinus tachycardia, anterior infarct and T-wave abnormalities consider inferolateral ischemia.  Chest x-ray with blunting of the left costophrenic sulcus related to trace effusion or atelectasis. CT of the head with 2.8 cm dense lesion along the right frontal lobe without mass effect or surrounding edema, malignancy unable to be excluded however findings may represent meningioma further evaluation recommended with MRI. CT chest, abdomen and pelvis with contrast revealed large bilateral PE with evidence of right heart strain, heterogenicity throughout the liver with focal hepatic nodule for which malignancy is unable to be excluded, hepatitis may be present and gallbladder thickening related to hepatic dysfunction however acute cholecystitis unable to be excluded.  V/Q scan revealed normal ventilation and perfusion.  While in ED patient was started on heparin drip and Cardiology was consulted.  Patient admitted to hospital medicine services for further medical management.    Patient was start continued on heparin drip and Cardiology recommended mechanical thrombectomy due to right heart strain.  Underwent pulmonary angiogram, thrombectomy on 09/14/2022 by Dr. Victoria.  Her respiratory status improved after thrombectomy.  Ultrasound of abdomen revealed severe gallbladder wall thickening, pericholecystic fluid and positive sonographic Ayala sign suggesting acute calculous cholecystitis.  There was a questionable nodular area of  3.3 cm inferior right hepatic lobe.  Cardiology suggested IVC filter placement due to  unstable thrombus in RLE.  Underwent IVC filter placement 09/16/2022.  She also developed mild anemia after thrombectomy and required 2 units of PRBC transfusion.  MRI brain showed 2.8cm right frontal extra-axial  mass is most suggestive of a meningioma along with chronic changes.  Patient reported following up for the known meningeal mass as outpatient and there was no concern for malignancy.  General surgery was consulted for cholecystectomy and Cardiology cleared for surgery.     Interval Hx:     Afebrile overnight.  Hemodynamically stable.  Blood pressure minimally accelerated.  She was alert, resting in the bed.  Complains of right upper quadrant discomfort.  Denies any nausea or vomiting.  Comfortably resting otherwise.  General surgery recommended HIDA scan.  Hemoglobin and platelets stable.  LFTs looking good today.      Objective/physical exam:  Vitals:    09/18/22 2000 09/18/22 2348 09/19/22 0009 09/19/22 0544   BP: (!) 142/94  (!) 129/90 (!) 143/92   Pulse: 78  76 74   Resp: 18 18 14 20   Temp: 99 °F (37.2 °C)  97.7 °F (36.5 °C) 98.6 °F (37 °C)   TempSrc: Oral   Oral   SpO2: 98%  (!) 94% 95%   Weight:       Height:         General: In no acute distress, afebrile  Respiratory: Clear to auscultation bilaterally  Cardiovascular: S1, S2, no appreciable murmur  Abdomen: Soft, minimal RUQ tenderness, murphys sign not tested, BS +  MSK: Warm, no lower extremity edema, no clubbing or cyanosis  Neurologic: Alert and oriented x4, moving all extremities with good strength     Lab Results   Component Value Date     09/19/2022    K 3.9 09/19/2022     03/12/2008    CO2 16 (L) 09/19/2022    BUN 5.4 (L) 09/19/2022    CREATININE 0.81 09/19/2022    CALCIUM 8.6 09/19/2022    EGFRNONAA >60 06/09/2022      Lab Results   Component Value Date    ALT 48 09/19/2022    AST 16 09/19/2022    ALKPHOS 83 09/19/2022    BILITOT 0.3 09/19/2022      Lab Results   Component Value Date    WBC 8.1 09/19/2022    HGB 9.4 (L) 09/19/2022    HCT 28.3 (L) 09/19/2022    .0 (H) 09/19/2022     09/19/2022           Medications:   buPROPion  300 mg Oral Daily    enoxaparin  70 mg Subcutaneous Q12H    folic acid  1 mg Oral Daily     hydrALAZINE  25 mg Oral Q8H    metoprolol succinate  50 mg Oral Daily    pantoprazole  40 mg Oral Daily    sodium bicarbonate  650 mg Oral BID      sodium chloride, sodium chloride, acetaminophen, aluminum-magnesium hydroxide-simethicone, calcium carbonate, fentaNYL, heparin (porcine), HYDROmorphone, hydrOXYzine HCL, iopamidoL, LIDOcaine (PF) 20 mg/ml (2%), melatonin, midazolam, naloxone, ondansetron, polyethylene glycol, prochlorperazine, simethicone, sodium chloride 0.9%, sodium chloride 0.9%     Assessment/Plan:    Bilateral unprovoked pulmonary embolism with right heart strain s/p thrombectomy 9/14/22  Right leg acute mobile DVT s/p IVC filter 9/16/22  Acute calculous cholecystitis  2.3 cm Right liver nodule  Acute hypoxemic resp distress - improving  B/l pleural effusion  Macrocytic anemia, folate deficiency  Acute on chronic anemia s/p blood transfusion  Transaminitis - improving  Acute kidney injury : resolved   Essential hypertension  H/O Gastroparesis   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  General surgery consulted for possible cholecystectomy.  Follow-up HIDA scan  -continue full-dose weight based Lovenox.   Switch to Xarelto after surgery  -hemoglobin stable.  Monitor and transfuse when needed  -encourage incentive spirometer use, ambulation as tolerated. Will repeat 2 view xray later.  - will benefit from outpatient follow up with liver MRI for nodule.  AFP within normal limits  - continue current antihypertensive regimen. Add hydroxyzine to help with anxiety  -continue current analgesic regimen   -educated patient regarding OCP side effects.  - counseled to quit using marijuana  - continue folic acid     Full code  Labs        Javon Hernandez MD

## 2022-09-19 NOTE — PLAN OF CARE
Problem: Adult Inpatient Plan of Care  Goal: Optimal Comfort and Wellbeing  Outcome: Ongoing, Progressing  Intervention: Monitor Pain and Promote Comfort  Flowsheets (Taken 9/18/2022 2317)  Pain Management Interventions:   care clustered   pillow support provided   quiet environment facilitated  Intervention: Provide Person-Centered Care  Flowsheets (Taken 9/18/2022 2317)  Trust Relationship/Rapport:   care explained   questions encouraged   choices provided   reassurance provided   emotional support provided   questions answered   thoughts/feelings acknowledged     Problem: Skin Injury Risk Increased  Goal: Skin Health and Integrity  Outcome: Ongoing, Progressing  Intervention: Optimize Skin Protection  Flowsheets (Taken 9/18/2022 2317)  Pressure Reduction Techniques:   frequent weight shift encouraged   heels elevated off bed  Skin Protection: incontinence pads utilized  Head of Bed (HOB) Positioning: HOB at 30-45 degrees  Intervention: Promote and Optimize Oral Intake  Flowsheets (Taken 9/18/2022 2317)  Oral Nutrition Promotion:   rest periods promoted   safe use of adaptive equipment encouraged     Problem: Adult Inpatient Plan of Care  Goal: Absence of Hospital-Acquired Illness or Injury  Outcome: Ongoing, Progressing

## 2022-09-19 NOTE — PLAN OF CARE
Problem: Adult Inpatient Plan of Care  Goal: Plan of Care Review  9/19/2022 0230 by Brenda Ramos RN  Outcome: Ongoing, Progressing  Flowsheets (Taken 9/19/2022 0230)  Plan of Care Reviewed With: patient  9/18/2022 2317 by Brenda Ramos RN  Outcome: Ongoing, Progressing     Problem: Adult Inpatient Plan of Care  Goal: Absence of Hospital-Acquired Illness or Injury  9/19/2022 0230 by Brenda Ramos RN  Outcome: Ongoing, Progressing  9/18/2022 2317 by Brenda Ramos RN  Outcome: Ongoing, Progressing  Intervention: Prevent and Manage VTE (Venous Thromboembolism) Risk  Flowsheets (Taken 9/19/2022 0230)  Activity Management: Ambulated -L4  Range of Motion: ROM (range of motion) performed  Intervention: Prevent Infection  Flowsheets (Taken 9/19/2022 0230)  Infection Prevention:   personal protective equipment utilized   equipment surfaces disinfected   hand hygiene promoted   single patient room provided     Problem: Adult Inpatient Plan of Care  Goal: Optimal Comfort and Wellbeing  9/19/2022 0230 by Brenda Ramos RN  Outcome: Ongoing, Progressing  9/18/2022 2317 by Brenda Ramos RN  Outcome: Ongoing, Progressing  Intervention: Monitor Pain and Promote Comfort  Flowsheets (Taken 9/18/2022 2317)  Pain Management Interventions:   care clustered   pillow support provided   quiet environment facilitated  Intervention: Provide Person-Centered Care  Flowsheets (Taken 9/18/2022 2317)  Trust Relationship/Rapport:   care explained   questions encouraged   choices provided   reassurance provided   emotional support provided   questions answered   thoughts/feelings acknowledged     Problem: Fall Injury Risk  Goal: Absence of Fall and Fall-Related Injury  Intervention: Promote Injury-Free Environment  Flowsheets (Taken 9/19/2022 0230)  Safety Promotion/Fall Prevention:   assistive device/personal item within reach   nonskid shoes/socks when out of bed     Problem: Skin Injury Risk Increased  Goal: Skin Health and  Integrity  Intervention: Promote and Optimize Oral Intake  Flowsheets (Taken 9/18/2022 2311)  Oral Nutrition Promotion:   rest periods promoted   safe use of adaptive equipment encouraged

## 2022-09-19 NOTE — CONSULTS
Acute Care Surgery  Consult Note        Subjective:     HPI:   Ms. Harvey is a 45 yo woman with past medical history of HTN, gastroparesis, L nephrectomy (kidney donor) and stable meningioma that was diagnosed 25 years ago and has since been stable who presented on 9/13 with intermittent epigastric/RUQ pain that has been present for 5-6 years that acute worsened that morning. States she had severe SOB which she contributed to her abdominal symptoms; however, on further work-up in the ED, she was noted to have large bilateral pulmonary emboli with right heart strain. She was admitted to the medicine service for further management.  On 9/14, she underwent a pulmonary angiography with bilateral thrombectomy with Cardiology. A venous doppler ultrasound of BLE was performed on the same day and was significant for a thrombus in the right common femoral vein. She was started on Xarelto BID and on 9/16, underwent IVC filter placement. Following the procedure, Xarelto was stopped and she has since been transitioned to Lovenox 70 mg BID.     Of note, she has been afebrile since admission with a normal WBC. Total bilirubin is 0.8. Abdominal ultrasound demonstrates severe gallbladder wall thickening, pericholecystic fluid and positive Ayala sign, which could indicate acalculous cholecystitis.      PMH:  Past Medical History:   Diagnosis Date    Gastroparesis     Hypertension          PSH:  Past Surgical History:   Procedure Laterality Date    NEPHRECTOMY           Medications:  No current facility-administered medications on file prior to encounter.     Current Outpatient Medications on File Prior to Encounter   Medication Sig Dispense Refill    allopurinoL (ZYLOPRIM) 100 MG tablet Take 100 mg by mouth once daily.      buPROPion (WELLBUTRIN XL) 300 MG 24 hr tablet Take 300 mg by mouth once daily.      furosemide (LASIX) 20 MG tablet Take 20 mg by mouth once daily.      hydrALAZINE (APRESOLINE) 50 MG tablet Take 50 mg by  mouth 3 (three) times daily.      levonorgestrel-ethinyl estradiol (ENPRESSE) 50-30 (6)/75-40 (5)/125-30(10) per tablet Take 1 tablet by mouth once daily.      MAGNESIUM ORAL Take by mouth once.      metoprolol succinate (TOPROL-XL) 50 MG 24 hr tablet Take 75 mg by mouth once daily.          Allergies:  Review of patient's allergies indicates:  No Known Allergies      Social Hx:  Social History     Tobacco Use   Smoking Status Not on file   Smokeless Tobacco Not on file      Social History     Substance and Sexual Activity   Alcohol Use Not on file         Relevant Family Hx:  No family history on file.      Objective:     Vitals:  BP: 129-159/   Pulse: 76-86   Resp: 14-18   Temp: 97.4-99.1 F      Physical Exam:  Gen: NAD  Neuro: awake, alert, answering questions appropriately  CV: RRR  Resp: non-labored breathing, MONIQUE  Abd: soft, ND. Mild tenderness with deep palpation to RUQ. No rebound or guarding.   Ext: moves all 4 spontaneously and purposefully  Skin: warm, well perfused     Labs:  WBC 7.5  H/H 9.8/28.6  Plt 221  K 3.4  BUN/Cr 6.4/0.82  Alb 2.2  Total bilirubin 0.4  AST 23, ALT 72     Imagin/14, Abdominal ultrasound:  Severe gallbladder wall thickening, pericholecystic fluid, and positive sonographic Ayala sign.  Findings could indicate a calculus cholecystitis.  Correlation with clinical findings recommended.     Assessment/Plan:  45 yo F with history of meningioma, left nephrectomy (kidney donor) who presented on  with chronic EG/RUQ pain that acutely worsened with SOB. Found to have bilateral pulmonary emboli as well as RLE DVT, now s/p pulm angio with bilateral thrombectomy on  and IVC filter placement on . Surgery consulted for possible cholecystitis.    - Patient is afebrile, HDS; WBC and total bilirubin have been WNL throughout admission  - Will obtain HIDA scan in the am for further evaluation  - Given patient's new onset pulmonary emboli and RLE DVT, patient will likely need a  cholecystostomy tube placed by IR with interval cholecystectomy planned at a later date   - Will follow up HIDA scan results      Pushpa Melo MD   [employer] General Surgery, PGY4  09/18/2022 8:44 PM

## 2022-09-20 ENCOUNTER — HOSPITAL ENCOUNTER (OUTPATIENT)
Dept: RADIOLOGY | Facility: HOSPITAL | Age: 47
Discharge: HOME | End: 2022-09-20
Attending: INTERNAL MEDICINE

## 2022-09-20 LAB
ALBUMIN SERPL-MCNC: 2.4 GM/DL (ref 3.5–5)
ALBUMIN/GLOB SERPL: 0.7 RATIO (ref 1.1–2)
ALP SERPL-CCNC: 89 UNIT/L (ref 40–150)
ALT SERPL-CCNC: 42 UNIT/L (ref 0–55)
AST SERPL-CCNC: 23 UNIT/L (ref 5–34)
BASOPHILS # BLD AUTO: 0.06 X10(3)/MCL (ref 0–0.2)
BASOPHILS NFR BLD AUTO: 0.8 %
BILIRUBIN DIRECT+TOT PNL SERPL-MCNC: 0.3 MG/DL
BUN SERPL-MCNC: 4.3 MG/DL (ref 7–18.7)
CALCIUM SERPL-MCNC: 8.6 MG/DL (ref 8.4–10.2)
CHLORIDE SERPL-SCNC: 107 MMOL/L (ref 98–107)
CO2 SERPL-SCNC: 18 MMOL/L (ref 22–29)
CREAT SERPL-MCNC: 0.9 MG/DL (ref 0.55–1.02)
EOSINOPHIL # BLD AUTO: 0.1 X10(3)/MCL (ref 0–0.9)
EOSINOPHIL NFR BLD AUTO: 1.3 %
ERYTHROCYTE [DISTWIDTH] IN BLOOD BY AUTOMATED COUNT: 20.9 % (ref 11.5–17)
GFR SERPLBLD CREATININE-BSD FMLA CKD-EPI: >60 MLS/MIN/1.73/M2
GLOBULIN SER-MCNC: 3.3 GM/DL (ref 2.4–3.5)
GLUCOSE SERPL-MCNC: 85 MG/DL (ref 74–100)
HCT VFR BLD AUTO: 31.4 % (ref 37–47)
HGB BLD-MCNC: 10.2 GM/DL (ref 12–16)
IMM GRANULOCYTES # BLD AUTO: 0.08 X10(3)/MCL (ref 0–0.04)
IMM GRANULOCYTES NFR BLD AUTO: 1 %
LYMPHOCYTES # BLD AUTO: 2.71 X10(3)/MCL (ref 0.6–4.6)
LYMPHOCYTES NFR BLD AUTO: 34.2 %
MAGNESIUM SERPL-MCNC: 1.6 MG/DL (ref 1.6–2.6)
MCH RBC QN AUTO: 36.2 PG (ref 27–31)
MCHC RBC AUTO-ENTMCNC: 32.5 MG/DL (ref 33–36)
MCV RBC AUTO: 111.3 FL (ref 80–94)
MONOCYTES # BLD AUTO: 0.59 X10(3)/MCL (ref 0.1–1.3)
MONOCYTES NFR BLD AUTO: 7.4 %
NEUTROPHILS # BLD AUTO: 4.4 X10(3)/MCL (ref 2.1–9.2)
NEUTROPHILS NFR BLD AUTO: 55.3 %
NRBC BLD AUTO-RTO: 0 %
PLATELET # BLD AUTO: 283 X10(3)/MCL (ref 130–400)
PMV BLD AUTO: 10.2 FL (ref 7.4–10.4)
POTASSIUM SERPL-SCNC: 3.7 MMOL/L (ref 3.5–5.1)
PROT SERPL-MCNC: 5.7 GM/DL (ref 6.4–8.3)
RBC # BLD AUTO: 2.82 X10(6)/MCL (ref 4.2–5.4)
RBCS: NORMAL
SODIUM SERPL-SCNC: 135 MMOL/L (ref 136–145)
WBC # SPEC AUTO: 7.9 X10(3)/MCL (ref 4.5–11.5)

## 2022-09-20 PROCEDURE — 63600175 PHARM REV CODE 636 W HCPCS: Performed by: INTERNAL MEDICINE

## 2022-09-20 PROCEDURE — 25000003 PHARM REV CODE 250: Performed by: INTERNAL MEDICINE

## 2022-09-20 PROCEDURE — 63600175 PHARM REV CODE 636 W HCPCS: Performed by: NURSE PRACTITIONER

## 2022-09-20 PROCEDURE — 83735 ASSAY OF MAGNESIUM: CPT | Performed by: INTERNAL MEDICINE

## 2022-09-20 PROCEDURE — 25000003 PHARM REV CODE 250: Performed by: NURSE PRACTITIONER

## 2022-09-20 PROCEDURE — 85025 COMPLETE CBC W/AUTO DIFF WBC: CPT | Performed by: INTERNAL MEDICINE

## 2022-09-20 PROCEDURE — 21400001 HC TELEMETRY ROOM

## 2022-09-20 PROCEDURE — 36415 COLL VENOUS BLD VENIPUNCTURE: CPT | Performed by: INTERNAL MEDICINE

## 2022-09-20 PROCEDURE — 80053 COMPREHEN METABOLIC PANEL: CPT | Performed by: INTERNAL MEDICINE

## 2022-09-20 PROCEDURE — 71046 X-RAY EXAM CHEST 2 VIEWS: CPT | Mod: TC

## 2022-09-20 RX ORDER — SUCRALFATE 1 G/1
1 TABLET ORAL
Status: DISCONTINUED | OUTPATIENT
Start: 2022-09-20 | End: 2022-09-21 | Stop reason: HOSPADM

## 2022-09-20 RX ORDER — HYDROCODONE BITARTRATE AND ACETAMINOPHEN 5; 325 MG/1; MG/1
1 TABLET ORAL EVERY 6 HOURS PRN
Status: DISCONTINUED | OUTPATIENT
Start: 2022-09-20 | End: 2022-09-21 | Stop reason: HOSPADM

## 2022-09-20 RX ADMIN — FOLIC ACID 1 MG: 1 TABLET ORAL at 08:09

## 2022-09-20 RX ADMIN — RIVAROXABAN 15 MG: 15 TABLET, FILM COATED ORAL at 07:09

## 2022-09-20 RX ADMIN — HYDROMORPHONE HYDROCHLORIDE 0.5 MG: 2 INJECTION INTRAMUSCULAR; INTRAVENOUS; SUBCUTANEOUS at 10:09

## 2022-09-20 RX ADMIN — RIVAROXABAN 15 MG: 15 TABLET, FILM COATED ORAL at 04:09

## 2022-09-20 RX ADMIN — HYDRALAZINE HYDROCHLORIDE 25 MG: 25 TABLET, FILM COATED ORAL at 09:09

## 2022-09-20 RX ADMIN — SODIUM BICARBONATE 650 MG TABLET 650 MG: at 08:09

## 2022-09-20 RX ADMIN — SODIUM BICARBONATE 650 MG TABLET 650 MG: at 09:09

## 2022-09-20 RX ADMIN — BUPROPION HYDROCHLORIDE 300 MG: 150 TABLET, FILM COATED, EXTENDED RELEASE ORAL at 08:09

## 2022-09-20 RX ADMIN — ONDANSETRON 4 MG: 2 INJECTION INTRAMUSCULAR; INTRAVENOUS at 10:09

## 2022-09-20 RX ADMIN — HYDROCODONE BITARTRATE AND ACETAMINOPHEN 1 TABLET: 5; 325 TABLET ORAL at 04:09

## 2022-09-20 RX ADMIN — PANTOPRAZOLE SODIUM 40 MG: 40 TABLET, DELAYED RELEASE ORAL at 08:09

## 2022-09-20 RX ADMIN — HYDROCODONE BITARTRATE AND ACETAMINOPHEN 1 TABLET: 5; 325 TABLET ORAL at 11:09

## 2022-09-20 RX ADMIN — HYDRALAZINE HYDROCHLORIDE 25 MG: 25 TABLET, FILM COATED ORAL at 02:09

## 2022-09-20 RX ADMIN — HYDROCODONE BITARTRATE AND ACETAMINOPHEN 1 TABLET: 5; 325 TABLET ORAL at 10:09

## 2022-09-20 RX ADMIN — ONDANSETRON 4 MG: 2 INJECTION INTRAMUSCULAR; INTRAVENOUS at 05:09

## 2022-09-20 RX ADMIN — HYDRALAZINE HYDROCHLORIDE 25 MG: 25 TABLET, FILM COATED ORAL at 05:09

## 2022-09-20 RX ADMIN — SUCRALFATE 1 G: 1 TABLET ORAL at 04:09

## 2022-09-20 RX ADMIN — METOPROLOL SUCCINATE 50 MG: 50 TABLET, EXTENDED RELEASE ORAL at 08:09

## 2022-09-20 RX ADMIN — SUCRALFATE 1 G: 1 TABLET ORAL at 11:09

## 2022-09-20 RX ADMIN — SUCRALFATE 1 G: 1 TABLET ORAL at 09:09

## 2022-09-20 RX ADMIN — MELATONIN TAB 3 MG 6 MG: 3 TAB at 10:09

## 2022-09-20 NOTE — PLAN OF CARE
Problem: Adult Inpatient Plan of Care  Goal: Plan of Care Review  Outcome: Ongoing, Progressing  Goal: Patient-Specific Goal (Individualized)  Outcome: Ongoing, Progressing  Goal: Absence of Hospital-Acquired Illness or Injury  Outcome: Ongoing, Progressing  Intervention: Identify and Manage Fall Risk  Flowsheets (Taken 9/19/2022 2000)  Safety Promotion/Fall Prevention:   assistive device/personal item within reach   side rails raised x 2   lighting adjusted   medications reviewed   nonskid shoes/socks when out of bed  Intervention: Prevent Skin Injury  Flowsheets (Taken 9/19/2022 2000)  Body Position: supine  Skin Protection:   adhesive use limited   protective footwear used   tubing/devices free from skin contact  Intervention: Prevent and Manage VTE (Venous Thromboembolism) Risk  Flowsheets (Taken 9/19/2022 2000)  Activity Management: Ambulated to bathroom - L4  VTE Prevention/Management: ambulation promoted  Intervention: Prevent Infection  Flowsheets (Taken 9/19/2022 2000)  Infection Prevention:   hand hygiene promoted   single patient room provided   equipment surfaces disinfected   rest/sleep promoted  Goal: Optimal Comfort and Wellbeing  Outcome: Ongoing, Progressing  Intervention: Monitor Pain and Promote Comfort  Flowsheets (Taken 9/19/2022 2000)  Pain Management Interventions:   medication offered   position adjusted   quiet environment facilitated  Intervention: Provide Person-Centered Care  Flowsheets (Taken 9/19/2022 2000)  Trust Relationship/Rapport:   care explained   choices provided   empathic listening provided   thoughts/feelings acknowledged   questions answered   emotional support provided  Goal: Readiness for Transition of Care  Outcome: Ongoing, Progressing     Problem: Fall Injury Risk  Goal: Absence of Fall and Fall-Related Injury  Outcome: Ongoing, Progressing  Intervention: Identify and Manage Contributors  Flowsheets (Taken 9/19/2022 2000)  Self-Care Promotion: independence  encouraged  Medication Review/Management: medications reviewed  Intervention: Promote Injury-Free Environment  Flowsheets (Taken 9/19/2022 2000)  Safety Promotion/Fall Prevention:   assistive device/personal item within reach   side rails raised x 2   lighting adjusted   medications reviewed   nonskid shoes/socks when out of bed     Problem: Skin Injury Risk Increased  Goal: Skin Health and Integrity  Outcome: Ongoing, Progressing

## 2022-09-20 NOTE — PROGRESS NOTES
Inpatient Nutrition Assessment    Admit Date: 9/13/2022   Total duration of encounter: 7 days     Nutrition Recommendation/Prescription     Continue Cardiac diet as tolerated  Recommend reglan to assist with nausea (+hx gastroparesis)    Communication of Recommendations: reviewed with nurse    Nutrition Assessment     Malnutrition Assessment/Nutrition-Focused Physical Exam    Malnutrition in the context of acute illness or injury  Degree of Malnutrition: non-severe (moderate) malnutrition  Energy Intake: </= 50% of estimated energy requirement for >/= 5 days  Interpretation of Weight Loss: 5% In 1 month  Body Fat:unable to obtain  Area of Body Fat Loss: unable to obtain  Muscle Mass Loss: unable to obtain  Area of Muscle Mass Loss: unable to obtain  Fluid Accumulation: does not meet criteria  Edema: does not meet criteria   Reduced  Strength: unable to obtain  A minimum of two characteristics is recommended for diagnosis of either severe or non-severe malnutrition.    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   Malnutrition, hypoalbuminemia    Relevant Medical History: HTN, gastroparesis, L nephrectomy (kidney donor) and stable meningioma that was diagnosed 25 years ago     Nutrition-Related Medications: folic acid, sucralfate  Calorie Containing IV Medications: no significant kcals from medications at this time    Nutrition-Related Labs:  9/20: H/H-10.2/31.4, Na-135, Bun-4.3    Diet/PN Order: Diet heart  "healthy  Oral Supplement Order: none at this time  Tube Feeding Order: none at this time  Appetite/Oral Intake: poor/0-25% of meals  Factors Affecting Nutritional Intake: nausea  Food/Episcopalian/Cultural Preferences: none reported    Skin Integrity: incision (neck and groin)  Wound(s):   no skin breakdown noted    Comments    : pt noted poor intake since admit. +nausea noted. Eating less than 50% of meals. Refusing oral supplement at this time. May need a low dose of reglan to help with nausea with history of gastroparesis.     Anthropometrics    Height: 5' 4.02" (162.6 cm) Height Method: Estimated  Last Weight: 74.8 kg (164 lb 14.5 oz) (22 1250) Weight Method: Estimated  BMI (Calculated): 28.3  BMI Classification: overweight (BMI 25-29.9)     Ideal Body Weight (IBW), Female: 120.1 lb     % Ideal Body Weight, Female (lb): 137.31 %                    Usual Body Weight (UBW), k.5 kg  % Usual Body Weight: 94.28  % Weight Change From Usual Weight: -5.91 %  Usual Weight Provided By: patient    Wt Readings from Last 5 Encounters:   22 74.8 kg (164 lb 14.5 oz)     Weight Change(s) Since Admission:  Admit Weight: 74.8 kg (165 lb) (22 1533)  : 5% wt loss in 3 weeks per patient    Estimated Needs    Weight Used For Calorie Calculations: 74.8 kg (164 lb 14.5 oz)  Energy Calorie Requirements (kcal): 1908 kcal (MSJ x SF 1.4)  Energy Need Method: [place]- Jeor  Weight Used For Protein Calculations: 74.8 kg (164 lb 14.5 oz)  Protein Requirements: 104 gm pro (kgx1.4)  Fluid Requirements (mL): 1908mL (1mL/kg)  Temp: 98 °F (36.7 °C)       Enteral Nutrition    Patient not receiving enteral nutrition at this time.    Parenteral Nutrition    Patient not receiving parenteral nutrition support at this time.    Evaluation of Received Nutrient Intake    Calories: not meeting estimated needs  Protein: not meeting estimated needs    Patient Education    Not applicable.    Nutrition Diagnosis     PES: " Malnutrition related to current illness as evidenced by 5% wt loss x3 weeks and poor intake > 5 days. (new)    Interventions/Goals     Intervention(s): general/healthful diet, prescription medication, and collaboration with other providers  Goal: Meet greater than 75% of nutritional needs by follow-up. (new)    Monitoring & Evaluation     Dietitian will monitor food and beverage intake.  Nutrition Risk/Follow-Up: moderate (follow-up in 3-5 days)

## 2022-09-20 NOTE — PROGRESS NOTES
[facility] Hospital Medicine Progress Note        Chief Complaint: Inpatient Follow-up for PE, cholecystitis    HPI:   Nahed Harvey is a 46 y.o. White female with a past medical history of hypertension, gastroparesis and kidney donor status post left nephrectomy. The patient presented to [facility] on 9/13/2022 with a primary complaint of epigastric abdominal pain with associated nausea, dry heaving and fatigue. She reports over the last week having right lower back pain with deep inspiration. On 9/11/2022 she was going to the restroom began experiencing tunnel vision and a near syncopal episode.  She reports sliding to the floor but was unable to get up due to weakness. Patient also complains of intermittent chest tightness, shortness of breath with exertion and pain behind the right knee which began today (09/14/2022) and chronic diarrhea. She is a former smoker who quit approximately 10 years ago. She is currently on birth control. She denies recent travel, history of prior blood clots/pulmonary embolism, history of cancer and history of clotting disorder in her family. Her mom who is in her 70s recently was diagnosed with a pulmonary embolism.  Patient reports experiencing migraine headaches approximately 25 years ago which an MRI of the brain was performed revealed a meningioma.  Repeat imaging was perform 5 years later and meningioma was stable.  Her nephrologist is Dr. Saba ( has unilateral kidney, she donated her kidney to a family member in 2008 )     Upon presentation to the ED, temperature 99.5° F, heart rate 123, blood pressure 150/160 and SpO2 97% on room air.  POC ABG with pH 7.44, pCO2 18.5, PO2 77, pHCO3 12.7. Labs with H&H 10.7/30, , CO2 17, BUN/creatinine 15.1/1.16 (9.4/0.95 on 06/09/2022), AST 79, , BNP 1419, initial troponin 0.285 with repeat decreased in the 0.210.  UDS positive for cannabis.  Influenza a and B and SARS-CoV-2 PCR negative.  UA with  trace bacteria, 6 WBC, 2+ bilirubin, 1+ occult blood, trace ketones and trace protein.  Initial EKG sinus tachycardia with a ventricular rate of 117 and nonspecific ST abnormalities.  Repeat EKG with sinus tachycardia, anterior infarct and T-wave abnormalities consider inferolateral ischemia.  Chest x-ray with blunting of the left costophrenic sulcus related to trace effusion or atelectasis. CT of the head with 2.8 cm dense lesion along the right frontal lobe without mass effect or surrounding edema, malignancy unable to be excluded however findings may represent meningioma further evaluation recommended with MRI. CT chest, abdomen and pelvis with contrast revealed large bilateral PE with evidence of right heart strain, heterogenicity throughout the liver with focal hepatic nodule for which malignancy is unable to be excluded, hepatitis may be present and gallbladder thickening related to hepatic dysfunction however acute cholecystitis unable to be excluded.  V/Q scan revealed normal ventilation and perfusion.  While in ED patient was started on heparin drip and Cardiology was consulted.  Patient admitted to hospital medicine services for further medical management.    Patient was start continued on heparin drip and Cardiology recommended mechanical thrombectomy due to right heart strain.  Underwent pulmonary angiogram, thrombectomy on 09/14/2022 by Dr. Victoria.  Her respiratory status improved after thrombectomy.  Ultrasound of abdomen revealed severe gallbladder wall thickening, pericholecystic fluid and positive sonographic Ayala sign suggesting acute calculous cholecystitis.  There was a questionable nodular area of  3.3 cm inferior right hepatic lobe.  Cardiology suggested IVC filter placement due to  unstable thrombus in RLE.  Underwent IVC filter placement 09/16/2022.  She also developed mild anemia after thrombectomy and required 2 units of PRBC transfusion.  MRI brain showed 2.8cm right frontal extra-axial  mass is most suggestive of a meningioma along with chronic changes.  Patient reported following up for the known meningeal mass as outpatient and there was no concern for malignancy.  General surgery was consulted for cholecystectomy and Cardiology cleared for surgery.  general surgery recommended HIDA scan . HIDA revealed normal gall bladder ejection fraction,  surgery recommended no intervention for now. Lovenox switched to xarelto    Interval Hx:     She remained afebrile overnight.  Blood pressure minimally accelerated.  Doing well on room air today.  Comfortably resting.  Continues to complain minimal soreness and tolerating p.o. diet.  Ambulating better.  Continues to report right lower extremity pain around the thrombus.  Otherwise she has no new complaints or concerns.    Labs this morning showing stable hemoglobin, macrocytosis, stable platelets.  Acidosis is improving.    She is worried about affording Xarelto due to lack of insurance      Objective/physical exam:  Vitals:    09/20/22 0523 09/20/22 0726 09/20/22 1003 09/20/22 1123   BP: (!) 148/93 (!) 150/95  (!) 154/103   BP Location:       Pulse: 70 78  74   Resp: 16 19 18 18   Temp: 98.7 °F (37.1 °C) 98.7 °F (37.1 °C)  98 °F (36.7 °C)   TempSrc: Oral Oral  Oral   SpO2: (!) 94% 96%  97%   Weight:       Height:         General: In no acute distress, afebrile  Respiratory: Clear to auscultation bilaterally  Cardiovascular: S1, S2, no appreciable murmur  Abdomen: Soft, minimal RUQ tenderness, murphys sign not tested, BS +  MSK: Warm, no lower extremity edema, no clubbing or cyanosis  Neurologic: Alert and oriented x4, moving all extremities with good strength     Lab Results   Component Value Date     (L) 09/20/2022    K 3.7 09/20/2022     03/12/2008    CO2 18 (L) 09/20/2022    BUN 4.3 (L) 09/20/2022    CREATININE 0.90 09/20/2022    CALCIUM 8.6 09/20/2022    EGFRNONAA >60 06/09/2022      Lab Results   Component Value Date    ALT 42 09/20/2022     AST 23 09/20/2022    ALKPHOS 89 09/20/2022    BILITOT 0.3 09/20/2022      Lab Results   Component Value Date    WBC 7.9 09/20/2022    HGB 10.2 (L) 09/20/2022    HCT 31.4 (L) 09/20/2022    .3 (H) 09/20/2022     09/20/2022           Medications:   buPROPion  300 mg Oral Daily    folic acid  1 mg Oral Daily    hydrALAZINE  25 mg Oral Q8H    metoprolol succinate  50 mg Oral Daily    pantoprazole  40 mg Oral Daily    rivaroxaban  15 mg Oral BID WM    sodium bicarbonate  650 mg Oral BID    sucralfate  1 g Oral QID (AC & HS)      sodium chloride, sodium chloride, acetaminophen, aluminum-magnesium hydroxide-simethicone, calcium carbonate, HYDROcodone-acetaminophen, hydrOXYzine HCL, melatonin, naloxone, ondansetron, polyethylene glycol, prochlorperazine, simethicone, sodium chloride 0.9%, sodium chloride 0.9%     Assessment/Plan:    Bilateral unprovoked pulmonary embolism with right heart strain s/p thrombectomy 9/14/22  Right leg acute mobile DVT s/p IVC filter 9/16/22  Acute calculous cholecystitis  2.3 cm Right liver nodule  Acute hypoxemic resp distress - improving  B/l pleural effusion  Macrocytic anemia, folate deficiency  Acute on chronic anemia s/p blood transfusion  Transaminitis - improving  Acute kidney injury : resolved   Essential hypertension  H/O Gastroparesis   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  No indication for surgical intervention.  -increase p.o. intake and advanced as tolerated   -switched to Xarelto.  Will inquire about insurance coverage prior to discharge . Otherwise we have to transition her to coumadin  -will repeat chest x-ray two view and encourage incentive spirometer  -hemoglobin stable.  Monitor and transfuse when needed  - will benefit from outpatient follow up with liver MRI for nodule.  AFP within normal limits  - continue current antihypertensive regimen.   - hydroxyzine to help  with anxiety  -BIANCA Ceja.  Hayes ledezma  -educated patient regarding OCP side effects.  - counseled to quit using marijuana  - continue folic acid     Full code  Labs  Possible dc tomorrow       Javon Hernandez MD

## 2022-09-21 VITALS
HEART RATE: 84 BPM | BODY MASS INDEX: 28.15 KG/M2 | HEIGHT: 64 IN | RESPIRATION RATE: 18 BRPM | SYSTOLIC BLOOD PRESSURE: 121 MMHG | DIASTOLIC BLOOD PRESSURE: 81 MMHG | WEIGHT: 164.88 LBS | OXYGEN SATURATION: 100 % | TEMPERATURE: 98 F

## 2022-09-21 PROCEDURE — 25000003 PHARM REV CODE 250: Performed by: INTERNAL MEDICINE

## 2022-09-21 PROCEDURE — 63600175 PHARM REV CODE 636 W HCPCS: Performed by: NURSE PRACTITIONER

## 2022-09-21 RX ORDER — HYDROXYZINE HYDROCHLORIDE 25 MG/1
25 TABLET, FILM COATED ORAL 3 TIMES DAILY PRN
Qty: 15 TABLET | Refills: 0 | Status: SHIPPED | OUTPATIENT
Start: 2022-09-21 | End: 2022-09-28

## 2022-09-21 RX ORDER — SIMETHICONE 80 MG
80 TABLET,CHEWABLE ORAL 4 TIMES DAILY PRN
Qty: 30 TABLET | Refills: 0 | Status: SHIPPED | OUTPATIENT
Start: 2022-09-21 | End: 2022-10-05

## 2022-09-21 RX ORDER — HYDRALAZINE HYDROCHLORIDE 25 MG/1
25 TABLET, FILM COATED ORAL EVERY 8 HOURS
Qty: 90 TABLET | Refills: 2 | Status: SHIPPED | OUTPATIENT
Start: 2022-09-21 | End: 2024-03-05

## 2022-09-21 RX ORDER — PANTOPRAZOLE SODIUM 40 MG/1
40 TABLET, DELAYED RELEASE ORAL DAILY
Qty: 30 TABLET | Refills: 2 | Status: SHIPPED | OUTPATIENT
Start: 2022-09-21 | End: 2024-03-05

## 2022-09-21 RX ORDER — POLYETHYLENE GLYCOL 3350 17 G/17G
17 POWDER, FOR SOLUTION ORAL DAILY PRN
Qty: 14 EACH | Refills: 0 | Status: SHIPPED | OUTPATIENT
Start: 2022-09-21 | End: 2022-10-01

## 2022-09-21 RX ORDER — HYDROCODONE BITARTRATE AND ACETAMINOPHEN 5; 325 MG/1; MG/1
1 TABLET ORAL EVERY 6 HOURS PRN
Qty: 15 TABLET | Refills: 0 | Status: SHIPPED | OUTPATIENT
Start: 2022-09-21 | End: 2022-09-28

## 2022-09-21 RX ORDER — SUCRALFATE 1 G/1
1 TABLET ORAL
Qty: 40 TABLET | Refills: 0 | Status: SHIPPED | OUTPATIENT
Start: 2022-09-21 | End: 2022-10-01

## 2022-09-21 RX ORDER — METOPROLOL SUCCINATE 50 MG/1
50 TABLET, EXTENDED RELEASE ORAL DAILY
Qty: 30 TABLET | Refills: 2 | Status: SHIPPED | OUTPATIENT
Start: 2022-09-21 | End: 2024-03-05 | Stop reason: ALTCHOICE

## 2022-09-21 RX ORDER — FOLIC ACID 1 MG/1
1 TABLET ORAL DAILY
Qty: 30 TABLET | Refills: 2 | Status: SHIPPED | OUTPATIENT
Start: 2022-09-21 | End: 2024-03-05

## 2022-09-21 RX ADMIN — SODIUM BICARBONATE 650 MG TABLET 650 MG: at 08:09

## 2022-09-21 RX ADMIN — BUPROPION HYDROCHLORIDE 300 MG: 150 TABLET, FILM COATED, EXTENDED RELEASE ORAL at 08:09

## 2022-09-21 RX ADMIN — HYDROCODONE BITARTRATE AND ACETAMINOPHEN 1 TABLET: 5; 325 TABLET ORAL at 06:09

## 2022-09-21 RX ADMIN — FOLIC ACID 1 MG: 1 TABLET ORAL at 08:09

## 2022-09-21 RX ADMIN — PANTOPRAZOLE SODIUM 40 MG: 40 TABLET, DELAYED RELEASE ORAL at 08:09

## 2022-09-21 RX ADMIN — ONDANSETRON 4 MG: 2 INJECTION INTRAMUSCULAR; INTRAVENOUS at 06:09

## 2022-09-21 RX ADMIN — HYDRALAZINE HYDROCHLORIDE 25 MG: 25 TABLET, FILM COATED ORAL at 05:09

## 2022-09-21 RX ADMIN — SUCRALFATE 1 G: 1 TABLET ORAL at 05:09

## 2022-09-21 RX ADMIN — RIVAROXABAN 15 MG: 15 TABLET, FILM COATED ORAL at 08:09

## 2022-09-21 RX ADMIN — METOPROLOL SUCCINATE 50 MG: 50 TABLET, EXTENDED RELEASE ORAL at 08:09

## 2022-09-21 NOTE — DISCHARGE SUMMARY
[facility]  [facility] Medicine  Discharge Summary      Patient Name: Nahed Harvey  MRN: [id number]  Patient Class: IP- Inpatient  Admission Date: 9/13/2022  Hospital Length of Stay: 7 days  Discharge Date and Time:  09/21/2022 8:34 AM  Attending Physician: Nusrat Kirk MD   Discharging Provider: Javon Hernandez MD  Primary Care Provider: Ford Saba MD    Nahed Harvey is a 46 y.o. White female with a past medical history of hypertension, gastroparesis and kidney donor status post left nephrectomy. The patient presented to [facility] on 9/13/2022 with a primary complaint of epigastric abdominal pain with associated nausea, dry heaving and fatigue. She reports over the last week having right lower back pain with deep inspiration. On 9/11/2022 she was going to the restroom began experiencing tunnel vision and a near syncopal episode.  She reports sliding to the floor but was unable to get up due to weakness. Patient also complains of intermittent chest tightness, shortness of breath with exertion and pain behind the right knee which began today (09/14/2022) and chronic diarrhea. She is a former smoker who quit approximately 10 years ago. She is currently on birth control. She denies recent travel, history of prior blood clots/pulmonary embolism, history of cancer and history of clotting disorder in her family. Her mom who is in her 70s recently was diagnosed with a pulmonary embolism.  Patient reports experiencing migraine headaches approximately 25 years ago which an MRI of the brain was performed revealed a meningioma.  Repeat imaging was perform 5 years later and meningioma was stable.  Her nephrologist is Dr. Saba ( has unilateral kidney, she donated her kidney to a family member in 2008 )     Upon presentation to the ED, temperature 99.5° F, heart rate 123, blood pressure 150/160 and SpO2 97% on room air.  POC ABG with pH 7.44, pCO2 18.5, PO2 77, pHCO3 12.7. Labs with H&H 10.7/30,  , CO2 17, BUN/creatinine 15.1/1.16 (9.4/0.95 on 06/09/2022), AST 79, , BNP 1419, initial troponin 0.285 with repeat decreased in the 0.210.  UDS positive for cannabis.  Influenza a and B and SARS-CoV-2 PCR negative.  UA with trace bacteria, 6 WBC, 2+ bilirubin, 1+ occult blood, trace ketones and trace protein.  Initial EKG sinus tachycardia with a ventricular rate of 117 and nonspecific ST abnormalities.  Repeat EKG with sinus tachycardia, anterior infarct and T-wave abnormalities consider inferolateral ischemia.  Chest x-ray with blunting of the left costophrenic sulcus related to trace effusion or atelectasis. CT of the head with 2.8 cm dense lesion along the right frontal lobe without mass effect or surrounding edema, malignancy unable to be excluded however findings may represent meningioma further evaluation recommended with MRI. CT chest, abdomen and pelvis with contrast revealed large bilateral PE with evidence of right heart strain, heterogenicity throughout the liver with focal hepatic nodule for which malignancy is unable to be excluded, hepatitis may be present and gallbladder thickening related to hepatic dysfunction however acute cholecystitis unable to be excluded.  V/Q scan revealed normal ventilation and perfusion.  While in ED patient was started on heparin drip and Cardiology was consulted.  Patient admitted to hospital medicine services for further medical management.     Patient was start continued on heparin drip and Cardiology recommended mechanical thrombectomy due to right heart strain.  Underwent pulmonary angiogram, thrombectomy on 09/14/2022 by Dr. Victoria.  Her respiratory status improved after thrombectomy.  Ultrasound of abdomen revealed severe gallbladder wall thickening, pericholecystic fluid and positive sonographic Ayala sign suggesting acute calculous cholecystitis.  There was a questionable nodular area of  3.3 cm inferior right hepatic lobe.  Cardiology suggested IVC  filter placement due to  unstable thrombus in RLE.  Underwent IVC filter placement 09/16/2022.  She also developed mild anemia after thrombectomy and required 2 units of PRBC transfusion.  MRI brain showed 2.8cm right frontal extra-axial mass is most suggestive of a meningioma along with chronic changes.  Patient reported following up for the known meningeal mass as outpatient and there was no concern for malignancy.  General surgery was consulted for cholecystectomy and Cardiology cleared for surgery.  general surgery recommended HIDA scan . HIDA revealed normal gall bladder ejection fraction,  and surgery recommended no intervention for now. Lovenox was switched to xarelto PE regimen. She remained stable since then except for pain in RLE.  Repeat chest x-ray showed pleural effusion for which incentive spirometer was encouraged.  She did well on room air at the time of discharge.  Prior to discharge all medications were reconciled, necessary prescriptions are provided.  Xarelto coupon was provided.    Encouraged her to follow-up with GI for liver nodule & possible MRI of the liver.  She also will follow-up with PCP.  Counseled on avoiding OCPs,  marijuana cessation, incentive spirometer use, ambulation, adverse effects of anticoagulation.    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   Malnutrition, hypoalbuminemia      Procedure(s) (LRB):  Insertion, Filter, Inferior Vena Cava (N/A)  VENOGRAM        Goals of Care Treatment Preferences:  Code Status: Full  Code      Consults:   Consults (From admission, onward)          Status Ordering Provider     Inpatient consult to General Surgery  Once        Provider:  Lyle Jefferson IV, MD    Completed MILO PHELAN     Inpatient consult to Cardiology  Once        Provider:  Harvey Moody MD    Completed LEONOR HARMON            No new Assessment & Plan notes have been filed under this hospital service since the last note was generated.  Service: Hospital Medicine    Final Active Diagnoses:    Diagnosis Date Noted POA    PRINCIPAL PROBLEM:  Bilateral pulmonary embolism [I26.99] 09/14/2022 Yes      Problems Resolved During this Admission:       Discharged Condition: good    Disposition: Home or Self Care    Follow Up:   Follow-up Information       Juancarlos Victoria MD Follow up in 1 week(s).    Specialty: Cardiology  Contact information:  [address] [place] [address]  [phone]               Ford Saba MD Follow up in 1 week(s).    Specialty: Nephrology  Contact information:  [address] [place] [address] [address] [place] [address]  [phone]                           Patient Instructions:   No discharge procedures on file.    Significant Diagnostic Studies: Labs: [facility]   Recent Labs   Lab 09/20/22  0405   *   K 3.7   CO2 18*   BUN 4.3*   CREATININE 0.90   CALCIUM 8.6   ALBUMIN 2.4*   BILITOT 0.3   ALKPHOS 89   AST 23   ALT 42       Pending Diagnostic Studies:       None           Medications:  Reconciled Home Medications:      Medication List        START taking these medications      folic acid 1 MG tablet  Commonly known as: FOLVITE  Take 1 tablet (1 mg total) by mouth once daily.     HYDROcodone-acetaminophen 5-325 mg per tablet  Commonly known as: NORCO  Take 1 tablet by mouth every 6 (six) hours as needed for Pain.     hydrOXYzine HCL 25 MG tablet  Commonly known as: ATARAX  Take 1 tablet (25 mg total) by mouth 3 (three) times daily as needed for Anxiety.     pantoprazole 40 MG tablet  Commonly  known as: PROTONIX  Take 1 tablet (40 mg total) by mouth once daily.     polyethylene glycol 17 gram Pwpk  Commonly known as: GLYCOLAX  Take 17 g by mouth daily as needed (constipation).     * rivaroxaban 15 mg Tab  Commonly known as: XARELTO  Take 1 tablet (15 mg total) by mouth 2 (two) times daily with meals. for 20 days     * rivaroxaban 10 mg Tab  Commonly known as: XARELTO  Take 2 tablets (20 mg total) by mouth daily with dinner or evening meal.  Start taking on: October 11, 2022     simethicone 80 MG chewable tablet  Commonly known as: MYLICON  Take 1 tablet (80 mg total) by mouth 4 (four) times daily as needed for Flatulence.     sucralfate 1 gram tablet  Commonly known as: CARAFATE  Take 1 tablet (1 g total) by mouth 4 (four) times daily before meals and nightly. for 10 days           * This list has 2 medication(s) that are the same as other medications prescribed for you. Read the directions carefully, and ask your doctor or other care provider to review them with you.                CHANGE how you take these medications      hydrALAZINE 25 MG tablet  Commonly known as: APRESOLINE  Take 1 tablet (25 mg total) by mouth every 8 (eight) hours.  What changed:   medication strength  how much to take  when to take this     metoprolol succinate 50 MG 24 hr tablet  Commonly known as: TOPROL-XL  Take 1 tablet (50 mg total) by mouth once daily.  What changed: how much to take            CONTINUE taking these medications      allopurinoL 100 MG tablet  Commonly known as: ZYLOPRIM  Take 100 mg by mouth once daily.     buPROPion 300 MG 24 hr tablet  Commonly known as: WELLBUTRIN XL  Take 300 mg by mouth once daily.     furosemide 20 MG tablet  Commonly known as: LASIX  Take 20 mg by mouth once daily.     MAGNESIUM ORAL  Take by mouth once.            STOP taking these medications      levonorgestrel-ethinyl estradiol 50-30 (6)/75-40 (5)/125-30(10) per tablet  Commonly known as: ENPRESSE              Indwelling  Lines/Drains at time of discharge:   Lines/Drains/Airways       None                   Time spent on the discharge of patient: 35 minutes         Javon Hernandez MD  Department of Hospital Medicine  [facility]

## 2022-09-21 NOTE — PLAN OF CARE
YADI was notified by  to follow up with patient regarding applying for Medicaid Insurance given that patient does not have health insurance at this time. Yadi met with patient at bedside and patient reports she was told by her CPA not to apply. Therefore, she would not like assistance with insurance at this time. Yadi provided patient with a Free 30-day supply coupon for Xarelto (hard copy).

## 2022-09-21 NOTE — PLAN OF CARE
Problem: Adult Inpatient Plan of Care  Goal: Plan of Care Review  Outcome: Ongoing, Progressing  Flowsheets (Taken 9/20/2022 2304)  Plan of Care Reviewed With: patient  Goal: Patient-Specific Goal (Individualized)  Outcome: Ongoing, Progressing  Intervention: Identify and Manage Fall Risk  Flowsheets (Taken 9/20/2022 2304)  Safety Promotion/Fall Prevention:   assistive device/personal item within reach   side rails raised x 3   lighting adjusted   medications reviewed  Intervention: Prevent Skin Injury  Flowsheets (Taken 9/20/2022 2304)  Body Position:   position maintained   position changed independently  Intervention: Prevent and Manage VTE (Venous Thromboembolism) Risk  Flowsheets (Taken 9/20/2022 2304)  Activity Management: Ambulated -L4  VTE Prevention/Management: fluids promoted  Range of Motion: active ROM (range of motion) encouraged  Intervention: Prevent Infection  Flowsheets (Taken 9/20/2022 2304)  Infection Prevention:   equipment surfaces disinfected   hand hygiene promoted   personal protective equipment utilized   rest/sleep promoted   single patient room provided  Goal: Optimal Comfort and Wellbeing  Outcome: Ongoing, Progressing  Intervention: Monitor Pain and Promote Comfort  Flowsheets (Taken 9/20/2022 2304)  Pain Management Interventions:   quiet environment facilitated   care clustered   pillow support provided  Intervention: Provide Person-Centered Care  Flowsheets (Taken 9/20/2022 2304)  Trust Relationship/Rapport:   care explained   choices provided   emotional support provided   empathic listening provided   questions answered   questions encouraged   thoughts/feelings acknowledged  Goal: Readiness for Transition of Care  Outcome: Ongoing, Progressing     Problem: Fall Injury Risk  Goal: Absence of Fall and Fall-Related Injury  Outcome: Ongoing, Progressing  Intervention: Identify and Manage Contributors  Flowsheets (Taken 9/20/2022 2304)  Self-Care Promotion:   independence encouraged    safe use of adaptive equipment encouraged  Medication Review/Management: medications reviewed  Intervention: Promote Injury-Free Environment  Flowsheets (Taken 9/20/2022 2304)  Safety Promotion/Fall Prevention:   assistive device/personal item within reach   side rails raised x 3   lighting adjusted   medications reviewed     Problem: Skin Injury Risk Increased  Goal: Skin Health and Integrity  Outcome: Ongoing, Progressing  Intervention: Promote and Optimize Oral Intake  Flowsheets (Taken 9/20/2022 2304)  Oral Nutrition Promotion: rest periods promoted

## 2022-09-22 ENCOUNTER — PATIENT OUTREACH (OUTPATIENT)
Dept: ADMINISTRATIVE | Facility: CLINIC | Age: 47
End: 2022-09-22

## 2022-09-22 NOTE — PROGRESS NOTES
C3 nurse attempted to contact Nahed Candice for a TCC post hospital discharge follow up call. No answer. Left voicemail with callback information. The patient has a scheduled HOSFU appointment with Pinky Pacheco NP, cardiology on 10/3/22 @ 3:00pm.

## 2022-11-03 ENCOUNTER — LAB VISIT (OUTPATIENT)
Dept: LAB | Facility: HOSPITAL | Age: 47
End: 2022-11-03
Attending: CLINICAL NURSE SPECIALIST

## 2022-11-03 DIAGNOSIS — I10 ESSENTIAL HYPERTENSION, MALIGNANT: Primary | ICD-10-CM

## 2022-11-03 DIAGNOSIS — Z86.718 PERSONAL HISTORY OF VENOUS THROMBOSIS AND EMBOLISM: Primary | ICD-10-CM

## 2022-11-03 LAB
ANION GAP SERPL CALC-SCNC: 15 MEQ/L
BUN SERPL-MCNC: 11.4 MG/DL (ref 7–18.7)
CALCIUM SERPL-MCNC: 10 MG/DL (ref 8.4–10.2)
CHLORIDE SERPL-SCNC: 98 MMOL/L (ref 98–107)
CO2 SERPL-SCNC: 23 MMOL/L (ref 22–29)
CREAT SERPL-MCNC: 1.44 MG/DL (ref 0.55–1.02)
CREAT/UREA NIT SERPL: 8
GFR SERPLBLD CREATININE-BSD FMLA CKD-EPI: 45 MLS/MIN/1.73/M2
GLUCOSE SERPL-MCNC: 108 MG/DL (ref 74–100)
POTASSIUM SERPL-SCNC: 4.3 MMOL/L (ref 3.5–5.1)
SODIUM SERPL-SCNC: 136 MMOL/L (ref 136–145)

## 2022-11-03 PROCEDURE — 36415 COLL VENOUS BLD VENIPUNCTURE: CPT

## 2022-11-03 PROCEDURE — 80048 BASIC METABOLIC PNL TOTAL CA: CPT

## 2022-11-04 ENCOUNTER — HOSPITAL ENCOUNTER (OUTPATIENT)
Dept: RADIOLOGY | Facility: HOSPITAL | Age: 47
Discharge: HOME | End: 2022-11-04
Attending: CLINICAL NURSE SPECIALIST

## 2022-11-04 DIAGNOSIS — Z86.718 PERSONAL HISTORY OF VENOUS THROMBOSIS AND EMBOLISM: ICD-10-CM

## 2022-11-04 PROCEDURE — 25500020 PHARM REV CODE 255

## 2022-11-04 PROCEDURE — 71275 CT ANGIOGRAPHY CHEST: CPT | Mod: TC

## 2022-11-04 RX ADMIN — IOPAMIDOL 100 ML: 755 INJECTION, SOLUTION INTRAVENOUS at 12:11

## 2022-12-26 PROBLEM — I26.99 BILATERAL PULMONARY EMBOLISM: Status: RESOLVED | Noted: 2022-09-14 | Resolved: 2022-12-26

## 2023-01-01 NOTE — Clinical Note
All wires were removed.  Mother here with infant for bili check. She states she is breastfeeding every 2-4hr for 45min and having wet and dirty diapers. She will see Dr. Vasquez tomorrow. TCB 10.6. Infant pink, no distress noted.

## 2023-01-12 DIAGNOSIS — K76.9 LESION OF LIVER: Primary | ICD-10-CM

## 2023-12-09 ENCOUNTER — HOSPITAL ENCOUNTER (EMERGENCY)
Facility: HOSPITAL | Age: 48
Discharge: HOME | End: 2023-12-09
Attending: STUDENT IN AN ORGANIZED HEALTH CARE EDUCATION/TRAINING PROGRAM
Payer: MEDICAID

## 2023-12-09 VITALS
SYSTOLIC BLOOD PRESSURE: 125 MMHG | DIASTOLIC BLOOD PRESSURE: 95 MMHG | RESPIRATION RATE: 18 BRPM | OXYGEN SATURATION: 99 % | HEART RATE: 90 BPM | TEMPERATURE: 97 F

## 2023-12-09 DIAGNOSIS — S22.069A CLOSED FRACTURE OF SEVENTH THORACIC VERTEBRA, UNSPECIFIED FRACTURE MORPHOLOGY, INITIAL ENCOUNTER: ICD-10-CM

## 2023-12-09 DIAGNOSIS — R11.2 NAUSEA AND VOMITING, UNSPECIFIED VOMITING TYPE: ICD-10-CM

## 2023-12-09 DIAGNOSIS — S22.059A CLOSED FRACTURE OF SIXTH THORACIC VERTEBRA, UNSPECIFIED FRACTURE MORPHOLOGY, INITIAL ENCOUNTER: ICD-10-CM

## 2023-12-09 DIAGNOSIS — R07.9 CHEST PAIN: Primary | ICD-10-CM

## 2023-12-09 DIAGNOSIS — K76.89 LIVER NODULE: ICD-10-CM

## 2023-12-09 LAB
ALBUMIN SERPL-MCNC: 3.4 G/DL (ref 3.5–5)
ALBUMIN/GLOB SERPL: 1.2 RATIO (ref 1.1–2)
ALP SERPL-CCNC: 95 UNIT/L (ref 40–150)
ALT SERPL-CCNC: 20 UNIT/L (ref 0–55)
APTT PPP: 31 SECONDS (ref 23.2–33.7)
AST SERPL-CCNC: 28 UNIT/L (ref 5–34)
B-HCG SERPL QL: NEGATIVE
BASOPHILS # BLD AUTO: 0.06 X10(3)/MCL
BASOPHILS NFR BLD AUTO: 0.7 %
BILIRUB SERPL-MCNC: 0.4 MG/DL
BNP BLD-MCNC: 17.3 PG/ML
BUN SERPL-MCNC: 11.9 MG/DL (ref 7–18.7)
CALCIUM SERPL-MCNC: 8.5 MG/DL (ref 8.4–10.2)
CHLORIDE SERPL-SCNC: 103 MMOL/L (ref 98–107)
CO2 SERPL-SCNC: 23 MMOL/L (ref 22–29)
CREAT SERPL-MCNC: 1.2 MG/DL (ref 0.55–1.02)
EOSINOPHIL # BLD AUTO: 0.06 X10(3)/MCL (ref 0–0.9)
EOSINOPHIL NFR BLD AUTO: 0.7 %
ERYTHROCYTE [DISTWIDTH] IN BLOOD BY AUTOMATED COUNT: 14.5 % (ref 11.5–17)
FLUAV AG UPPER RESP QL IA.RAPID: NOT DETECTED
FLUBV AG UPPER RESP QL IA.RAPID: NOT DETECTED
GFR SERPLBLD CREATININE-BSD FMLA CKD-EPI: 56 MLS/MIN/1.73/M2
GLOBULIN SER-MCNC: 2.8 GM/DL (ref 2.4–3.5)
GLUCOSE SERPL-MCNC: 85 MG/DL (ref 74–100)
HCT VFR BLD AUTO: 32.8 % (ref 37–47)
HGB BLD-MCNC: 11.7 G/DL (ref 12–16)
IMM GRANULOCYTES # BLD AUTO: 0.03 X10(3)/MCL (ref 0–0.04)
IMM GRANULOCYTES NFR BLD AUTO: 0.3 %
INR PPP: 1.2
LIPASE SERPL-CCNC: 15 U/L
LYMPHOCYTES # BLD AUTO: 2.27 X10(3)/MCL (ref 0.6–4.6)
LYMPHOCYTES NFR BLD AUTO: 25.7 %
MCH RBC QN AUTO: 44.5 PG (ref 27–31)
MCHC RBC AUTO-ENTMCNC: 35.7 G/DL (ref 33–36)
MCV RBC AUTO: 124.7 FL (ref 80–94)
MONOCYTES # BLD AUTO: 0.43 X10(3)/MCL (ref 0.1–1.3)
MONOCYTES NFR BLD AUTO: 4.9 %
NEUTROPHILS # BLD AUTO: 5.99 X10(3)/MCL (ref 2.1–9.2)
NEUTROPHILS NFR BLD AUTO: 67.7 %
NRBC BLD AUTO-RTO: 0 %
PLATELET # BLD AUTO: 160 X10(3)/MCL (ref 130–400)
PMV BLD AUTO: 11.9 FL (ref 7.4–10.4)
POTASSIUM SERPL-SCNC: 3.6 MMOL/L (ref 3.5–5.1)
PROT SERPL-MCNC: 6.2 GM/DL (ref 6.4–8.3)
PROTHROMBIN TIME: 14.8 SECONDS (ref 12.5–14.5)
RBC # BLD AUTO: 2.63 X10(6)/MCL (ref 4.2–5.4)
SARS-COV-2 RNA RESP QL NAA+PROBE: NOT DETECTED
SODIUM SERPL-SCNC: 141 MMOL/L (ref 136–145)
TROPONIN I SERPL-MCNC: <0.01 NG/ML (ref 0–0.04)
TROPONIN I SERPL-MCNC: <0.01 NG/ML (ref 0–0.04)
WBC # SPEC AUTO: 8.84 X10(3)/MCL (ref 4.5–11.5)

## 2023-12-09 PROCEDURE — 96374 THER/PROPH/DIAG INJ IV PUSH: CPT | Mod: 59

## 2023-12-09 PROCEDURE — 80053 COMPREHEN METABOLIC PANEL: CPT | Performed by: STUDENT IN AN ORGANIZED HEALTH CARE EDUCATION/TRAINING PROGRAM

## 2023-12-09 PROCEDURE — 85610 PROTHROMBIN TIME: CPT | Performed by: STUDENT IN AN ORGANIZED HEALTH CARE EDUCATION/TRAINING PROGRAM

## 2023-12-09 PROCEDURE — 0240U COVID/FLU A&B PCR: CPT | Performed by: STUDENT IN AN ORGANIZED HEALTH CARE EDUCATION/TRAINING PROGRAM

## 2023-12-09 PROCEDURE — 85025 COMPLETE CBC W/AUTO DIFF WBC: CPT | Performed by: STUDENT IN AN ORGANIZED HEALTH CARE EDUCATION/TRAINING PROGRAM

## 2023-12-09 PROCEDURE — 81025 URINE PREGNANCY TEST: CPT | Performed by: STUDENT IN AN ORGANIZED HEALTH CARE EDUCATION/TRAINING PROGRAM

## 2023-12-09 PROCEDURE — 83690 ASSAY OF LIPASE: CPT | Performed by: STUDENT IN AN ORGANIZED HEALTH CARE EDUCATION/TRAINING PROGRAM

## 2023-12-09 PROCEDURE — 93005 ELECTROCARDIOGRAM TRACING: CPT

## 2023-12-09 PROCEDURE — 25000003 PHARM REV CODE 250: Performed by: STUDENT IN AN ORGANIZED HEALTH CARE EDUCATION/TRAINING PROGRAM

## 2023-12-09 PROCEDURE — 83880 ASSAY OF NATRIURETIC PEPTIDE: CPT | Performed by: STUDENT IN AN ORGANIZED HEALTH CARE EDUCATION/TRAINING PROGRAM

## 2023-12-09 PROCEDURE — 84484 ASSAY OF TROPONIN QUANT: CPT | Performed by: STUDENT IN AN ORGANIZED HEALTH CARE EDUCATION/TRAINING PROGRAM

## 2023-12-09 PROCEDURE — 99285 EMERGENCY DEPT VISIT HI MDM: CPT | Mod: 25

## 2023-12-09 PROCEDURE — 25500020 PHARM REV CODE 255: Performed by: STUDENT IN AN ORGANIZED HEALTH CARE EDUCATION/TRAINING PROGRAM

## 2023-12-09 PROCEDURE — 96375 TX/PRO/DX INJ NEW DRUG ADDON: CPT | Mod: 59

## 2023-12-09 PROCEDURE — 63600175 PHARM REV CODE 636 W HCPCS: Performed by: STUDENT IN AN ORGANIZED HEALTH CARE EDUCATION/TRAINING PROGRAM

## 2023-12-09 PROCEDURE — 96372 THER/PROPH/DIAG INJ SC/IM: CPT | Performed by: STUDENT IN AN ORGANIZED HEALTH CARE EDUCATION/TRAINING PROGRAM

## 2023-12-09 PROCEDURE — 85730 THROMBOPLASTIN TIME PARTIAL: CPT | Performed by: STUDENT IN AN ORGANIZED HEALTH CARE EDUCATION/TRAINING PROGRAM

## 2023-12-09 RX ORDER — ONDANSETRON 4 MG/1
4 TABLET, ORALLY DISINTEGRATING ORAL EVERY 8 HOURS PRN
Qty: 15 TABLET | Refills: 0 | Status: SHIPPED | OUTPATIENT
Start: 2023-12-09 | End: 2023-12-14

## 2023-12-09 RX ORDER — HYDROCODONE BITARTRATE AND ACETAMINOPHEN 5; 325 MG/1; MG/1
1 TABLET ORAL EVERY 8 HOURS PRN
Qty: 15 TABLET | Refills: 0 | Status: SHIPPED | OUTPATIENT
Start: 2023-12-09 | End: 2023-12-14

## 2023-12-09 RX ORDER — ONDANSETRON 2 MG/ML
4 INJECTION INTRAMUSCULAR; INTRAVENOUS
Status: COMPLETED | OUTPATIENT
Start: 2023-12-09 | End: 2023-12-09

## 2023-12-09 RX ORDER — MAG HYDROX/ALUMINUM HYD/SIMETH 200-200-20
10 SUSPENSION, ORAL (FINAL DOSE FORM) ORAL
Status: COMPLETED | OUTPATIENT
Start: 2023-12-09 | End: 2023-12-09

## 2023-12-09 RX ORDER — PROMETHAZINE HYDROCHLORIDE 25 MG/ML
12.5 INJECTION, SOLUTION INTRAMUSCULAR; INTRAVENOUS
Status: COMPLETED | OUTPATIENT
Start: 2023-12-09 | End: 2023-12-09

## 2023-12-09 RX ORDER — HYDROMORPHONE HYDROCHLORIDE 2 MG/ML
1 INJECTION, SOLUTION INTRAMUSCULAR; INTRAVENOUS; SUBCUTANEOUS
Status: COMPLETED | OUTPATIENT
Start: 2023-12-09 | End: 2023-12-09

## 2023-12-09 RX ORDER — PROMETHAZINE HYDROCHLORIDE 12.5 MG/1
12.5 TABLET ORAL EVERY 6 HOURS PRN
Qty: 21 TABLET | Refills: 0 | Status: SHIPPED | OUTPATIENT
Start: 2023-12-09 | End: 2023-12-16

## 2023-12-09 RX ORDER — MORPHINE SULFATE 4 MG/ML
4 INJECTION, SOLUTION INTRAMUSCULAR; INTRAVENOUS
Status: COMPLETED | OUTPATIENT
Start: 2023-12-09 | End: 2023-12-09

## 2023-12-09 RX ORDER — PROMETHAZINE HYDROCHLORIDE 25 MG/ML
25 INJECTION, SOLUTION INTRAMUSCULAR; INTRAVENOUS
Status: COMPLETED | OUTPATIENT
Start: 2023-12-09 | End: 2023-12-09

## 2023-12-09 RX ADMIN — MORPHINE SULFATE 4 MG: 4 INJECTION, SOLUTION INTRAMUSCULAR; INTRAVENOUS at 10:12

## 2023-12-09 RX ADMIN — IOPAMIDOL 100 ML: 755 INJECTION, SOLUTION INTRAVENOUS at 09:12

## 2023-12-09 RX ADMIN — PROMETHAZINE HYDROCHLORIDE 25 MG: 25 INJECTION INTRAMUSCULAR; INTRAVENOUS at 02:12

## 2023-12-09 RX ADMIN — PROMETHAZINE HYDROCHLORIDE 12.5 MG: 25 INJECTION INTRAMUSCULAR; INTRAVENOUS at 05:12

## 2023-12-09 RX ADMIN — HYDROMORPHONE HYDROCHLORIDE 1 MG: 2 INJECTION INTRAMUSCULAR; INTRAVENOUS; SUBCUTANEOUS at 01:12

## 2023-12-09 RX ADMIN — ALUMINUM HYDROXIDE, MAGNESIUM HYDROXIDE, AND SIMETHICONE 10 ML: 200; 200; 20 SUSPENSION ORAL at 01:12

## 2023-12-09 RX ADMIN — ONDANSETRON 4 MG: 2 INJECTION INTRAMUSCULAR; INTRAVENOUS at 10:12

## 2023-12-09 NOTE — DISCHARGE INSTRUCTIONS
Follow-up with the primary care physician.      If you do not have a primary care physician please call [phone] for an appointment.    Follow-up with Gastroenterology.  You have liver nodule which will need further workup and evaluation.      You may take nausea medication as prescribed.      Return to the emergency department if you have any new or worsening symptoms, difficulty breathing, chest pain, shortness of breath, fever, or any other concerns.

## 2023-12-09 NOTE — ED PROVIDER NOTES
Encounter Date: 12/9/2023    SCRIBE #1 NOTE: I, Nora Winters, am scribing for, and in the presence of,  Josiah Mcknight MD. I have scribed the following portions of the note - Other sections scribed: HPI, ROS, PE, EKG.       History   No chief complaint on file.  Abdominal pain      Patient is a 47 year old female with a past medical history of gastroparesis, pulmonary embolism on Xarelto, and hypertension presents to the ED for chest pain with associated shortness of breath onset at approximately 05:00 this morning. Patient's back pain and chest tightness is exacerbated by deep breaths and speaking. Patient reports being hospitalized last year for back pain and abdominal pain that was related to a pulmonary embolism. Patient notes that the back pain feels similar to her previous episode, but the chest discomfort is a new symptom. Per chart review, she was seen in the ED 9/13/22 where she was diagnosed with gastroparesis.  She reports a nausea and vomiting episode earlier this morning, but notes that it has since resolved. She denies leg swelling.     Cardiologist: Juancarlos Victoria MD      The history is provided by the patient. No  was used.   Chest Pain  The current episode started 2 to 3 hours ago. The quality of the pain is described as tightness. Chest pain is worsened by deep breathing. Primary symptoms include shortness of breath, nausea and vomiting. Pertinent negatives for primary symptoms include no fever and no abdominal pain.   The shortness of breath began today.   Nausea began today.   The vomiting began today. Vomiting occurred once.   Pertinent negatives for associated symptoms include no weakness.   Her past medical history is significant for hypertension and PE.     Review of patient's allergies indicates:  No Known Allergies  Past Medical History:   Diagnosis Date    Gastroparesis     Hypertension      Past Surgical History:   Procedure Laterality Date    INSERTION OF INFERIOR  VENA CAVAL FILTER N/A 9/16/2022    Procedure: Insertion, Filter, Inferior Vena Cava;  Surgeon: Juancarlos Victoria MD;  Location: [facility] CATH LAB;  Service: Cardiology;  Laterality: N/A;    NEPHRECTOMY      PERCUTANEOUS MECHANICAL THROMBECTOMY OF VASCULAR GRAFT OF UPPER EXTREMITY  9/14/2022    Procedure: THROMBECTOMY, MECHANICAL, VASCULAR GRAFT, UPPER EXTREMITY, PERCUTANEOUS;  Surgeon: Juancarlos Victoria MD;  Location: [facility] CATH LAB;  Service: Cardiology;;    PHLEBOGRAPHY  9/16/2022    Procedure: VENOGRAM;  Surgeon: Juancarlos Victoria MD;  Location: [facility] CATH LAB;  Service: Cardiology;;    THROMBECTOMY  9/14/2022    Procedure: THROMBECTOMY;  Surgeon: Juancarlos Victoria MD;  Location: [facility] CATH LAB;  Service: Cardiology;;     No family history on file.     Review of Systems   Constitutional:  Negative for fever.   HENT:  Negative for sore throat.    Eyes:  Negative for visual disturbance.   Respiratory:  Positive for chest tightness and shortness of breath.    Cardiovascular:  Positive for chest pain. Negative for leg swelling.   Gastrointestinal:  Positive for nausea and vomiting. Negative for abdominal pain.   Genitourinary:  Negative for dysuria.   Musculoskeletal:  Positive for back pain. Negative for joint swelling.   Skin:  Negative for rash.   Neurological:  Negative for weakness.   Psychiatric/Behavioral:  Negative for confusion.        Physical Exam     Initial Vitals   BP Pulse Resp Temp SpO2   12/09/23 0859 12/09/23 0859 12/09/23 0859 12/09/23 1157 12/09/23 0859   122/83 92 13 97.2 °F (36.2 °C) 97 %      MAP       --                Physical Exam    Nursing note and vitals reviewed.  Constitutional: She appears well-developed and well-nourished. She is not diaphoretic. No distress.   HENT:   Head: Normocephalic and atraumatic.   Eyes: Conjunctivae and EOM are normal. Pupils are equal, round, and reactive to light.   Neck:   Normal range of motion.  Cardiovascular:  Normal rate, regular rhythm, normal heart sounds and intact distal  pulses.           No murmur heard.  Pulmonary/Chest: Breath sounds normal. No respiratory distress. She has no wheezes. She has no rales.   Abdominal: Abdomen is soft. She exhibits no distension. There is no abdominal tenderness.   Musculoskeletal:         General: No tenderness or edema. Normal range of motion.      Cervical back: Normal range of motion.     Neurological: She is alert and oriented to person, place, and time. No cranial nerve deficit.   Skin: Skin is warm. No rash noted. No erythema.         ED Course   Procedures  Labs Reviewed   COMPREHENSIVE METABOLIC PANEL - Abnormal; Notable for the following components:       Result Value    Creatinine 1.20 (*)     Protein Total 6.2 (*)     Albumin Level 3.4 (*)     All other components within normal limits   PROTIME-INR - Abnormal; Notable for the following components:    PT 14.8 (*)     All other components within normal limits   CBC WITH DIFFERENTIAL - Abnormal; Notable for the following components:    RBC 2.63 (*)     Hgb 11.7 (*)     Hct 32.8 (*)     .7 (*)     MCH 44.5 (*)     MPV 11.9 (*)     All other components within normal limits   B-TYPE NATRIURETIC PEPTIDE - Normal   APTT - Normal   TROPONIN I - Normal   COVID/FLU A&B PCR - Normal    Narrative:     The Xpert Xpress SARS-CoV-2/FLU/RSV plus is a rapid, multiplexed real-time PCR test intended for the simultaneous qualitative detection and differentiation of SARS-CoV-2, Influenza A, Influenza B, and respiratory syncytial virus (RSV) viral RNA in either nasopharyngeal swab or nasal swab specimens.         PREGNANCY TEST, URINE RAPID - Normal   TROPONIN I - Normal   LIPASE - Normal   CBC W/ AUTO DIFFERENTIAL    Narrative:     The following orders were created for panel order CBC auto differential.  Procedure                               Abnormality         Status                     ---------                               -----------         ------                     CBC with  Differential[[id number]]        Abnormal            Final result                 Please view results for these tests on the individual orders.     EKG Readings: (Independently Interpreted)   Initial Reading: No STEMI. Rhythm: Normal Sinus Rhythm. Heart Rate: 82. Ectopy: No Ectopy. Conduction: Normal. ST Segments: Normal ST Segments. T Waves: Normal. Clinical Impression: Normal Sinus Rhythm   Performed at 06:25  Prolonged QTC interval of 467     ECG Results              EKG 12-lead (Final result)  Result time 12/09/23 12:06:19      Final result by Bronwyn, [facility] (12/09/23 12:06:19)                   Narrative:    Test Reason : R07.9,    Vent. Rate : 082 BPM     Atrial Rate : 082 BPM     P-R Int : 146 ms          QRS Dur : 074 ms      QT Int : 400 ms       P-R-T Axes : 063 039 055 degrees     QTc Int : 467 ms    Normal sinus rhythm  Nonspecific T wave abnormality  Prolonged QT  Abnormal ECG    Confirmed by Irvin Berrios MD ([id number]) on 12/9/2023 12:06:08 PM    Referred By:             Confirmed By:Irvin Berrios MD                                  Imaging Results              US Abdomen Limited (Final result)  Result time 12/09/23 16:52:49      Final result by Andriy Bond MD (12/09/23 16:52:49)                   Impression:      No acute sonographic abnormality identified.    18 mm slightly echogenic right hepatic lesion likely corresponds with 1 of the lesions seen on the earlier chest CT.  These lesions are nonspecific, but appear stable since November 2022.  Given the stability, these are almost certainly benign, but if further characterization of these lesions is needed due to patient risk factors, abdominal MRI can be pursued.      Electronically signed by: Andriy Bond  Date:    12/09/2023  Time:    16:52               Narrative:    EXAMINATION:  US ABDOMEN LIMITED    CLINICAL HISTORY:  RUQ pain;    TECHNIQUE:  Gray-scale and color Doppler ultrasound images of the  abdomen.    COMPARISON:  Ultrasound 09/14/2022    Chest CT 12/09/2023    FINDINGS:  Pancreas largely obscured.  Patent superior IVC.  Imaged segments of the aorta normal in caliber.    Increased and mildly heterogeneous overall hepatic echogenicity.  Slightly echogenic lesion in the right liver measuring 18 x 11 x 17 mm.  Main portal vein patent with normal flow direction.    No shadowing gallstone or gallbladder wall thickening.  Normal CBD caliber.  No ascites.    No hydronephrosis or defined calcification in the right kidney.    Measurements:    - Liver: 15.3 cm    - CBD diameter: 3 mm    - Right kidney: 12.1 cm in length                                       CTA Chest Non-Coronary (PE Studies) (Final result)  Result time 12/09/23 09:44:27      Final result by Andriy Bond MD (12/09/23 09:44:27)                   Impression:      No pulmonary embolism identified.    Mild central compression deformities of the T5 and T6 vertebral endplates are new since 2022, but favored nonacute.    Several indeterminate liver lesions are stable since November 2022.      Electronically signed by: Andriy Bond  Date:    12/09/2023  Time:    09:44               Narrative:    EXAMINATION:  CTA CHEST NON CORONARY (PE STUDIES)    CLINICAL HISTORY:  Pulmonary embolism (PE) suspected, high prob;Hx of PE, states feels similiar;    TECHNIQUE:  CTA imaging of the chest after IV contrast. Axial, coronal and sagittal reconstructions, including MIP images, are reviewed. Dose length product 236 mGycm. Automatic exposure control, adjustment of mA/kV or iterative reconstruction technique used to limit radiation dose.    COMPARISON:  CT 11/04/2022    FINDINGS:  Diagnostic quality: Adequate    Pulmonary embolism: None identified.    Lung parenchyma: Minimal dependent atelectasis bilaterally.    Pleural effusion: None.    Mediastinum/becky: Normal heart size and thoracic aortic caliber.  No pathologically enlarged lymph node.    Chest  wall/axilla: No significant findings.    Upper abdomen: Several small enhancing and hypodense liver lesions have similar appearance since prior CT.    Bones: Mild central compression deformities involving the superior endplates of T5 and T6.  No retropulsion.                                       X-Ray Chest AP Portable (Final result)  Result time 12/09/23 08:58:11      Final result by Andriy Bond MD (12/09/23 08:58:11)                   Impression:      No acute pulmonary process identified.      Electronically signed by: Andriy Bond  Date:    12/09/2023  Time:    08:58               Narrative:    EXAMINATION:  XR CHEST AP PORTABLE    CLINICAL HISTORY:  Chest pain, unspecified    TECHNIQUE:  Frontal view(s) of the chest.    COMPARISON:  Radiography 09/20/2022    FINDINGS:  Normal cardiac silhouette.  The lungs are well-inflated.  No consolidation identified.  No significant pleural effusion or discernible pneumothorax.                                       Medications   iopamidoL (ISOVUE-370) injection 100 mL (100 mLs Intravenous Given 12/9/23 0923)   morphine injection 4 mg (4 mg Intravenous Given 12/9/23 1032)   ondansetron injection 4 mg (4 mg Intravenous Given 12/9/23 1033)   aluminum-magnesium hydroxide-simethicone 200-200-20 mg/5 mL suspension 10 mL (10 mLs Oral Given 12/9/23 1309)   HYDROmorphone (PF) injection 1 mg (1 mg Intravenous Given 12/9/23 1308)   promethazine injection 25 mg (25 mg Intramuscular Given 12/9/23 1456)   promethazine injection 12.5 mg (12.5 mg Intramuscular Given 12/9/23 1709)     Medical Decision Making  Problems Addressed:  Chest pain: acute illness or injury that poses a threat to life or bodily functions  Closed fracture of seventh thoracic vertebra, unspecified fracture morphology, initial encounter: acute illness or injury that poses a threat to life or bodily functions  Closed fracture of sixth thoracic vertebra, unspecified fracture morphology, initial encounter: acute  "illness or injury that poses a threat to life or bodily functions  Liver nodule: acute illness or injury that poses a threat to life or bodily functions  Nausea and vomiting, unspecified vomiting type: acute illness or injury that poses a threat to life or bodily functions    Amount and/or Complexity of Data Reviewed  External Data Reviewed: notes.     Details:  Per chart review, she was seen in the ED 9/13/22 for "Abdominal pain, nausea, and vomiting over the past several weeks. Patient reports feeling weak and being unable to keep food down. She notes these symptoms are chronic in nature due to gastroparesis."  Labs: ordered.  Radiology: ordered and independent interpretation performed.  ECG/medicine tests: ordered and independent interpretation performed.    Risk  OTC drugs.  Prescription drug management.  Parenteral controlled substances.  Decision regarding hospitalization.            Scribe Attestation:   Scribe #1: I performed the above scribed service and the documentation accurately describes the services I performed. I attest to the accuracy of the note.    Attending Attestation:           Physician Attestation for Scribe:  Physician Attestation Statement for Scribe #1: I, Josiah Mcknight MD, reviewed documentation, as scribed by Nora Winters in my presence, and it is both accurate and complete.             ED Course as of 12/15/23 0804   Sat Dec 09, 2023   1029 X-Ray Chest AP Portable [RP]   1314 X-Ray Chest AP Portable [RP]      ED Course User Index  [RP] Josiah Mcknight MD               Medical Decision Making:   History:   Old Medical Records: I decided to obtain old medical records.  Old Records Summarized: records from clinic visits, records from previous admission(s) and records from another hospital.       <> Summary of Records: Reviewed old records, review previous discharge summary from September of 2022.  Initial Assessment:   Chest pain  Differential Diagnosis:   Judging by the patient's " chief complaint and pertinent history, the patient has the following possible differential diagnoses, including but not limited to the following.  Some of these are deemed to be lower likelihood and some more likely based on my physical exam and history combined with possible lab work and/or imaging studies.   Please see the pertinent studies, and refer to the HPI.  Some of these diagnoses will take further evaluation to fully rule out, perhaps as an outpatient and the patient was encouraged to follow up when discharged for more comprehensive evaluation.    Chest pain emergent diagnoses: ACS, PE, dissection, cardiac tamponade, tension pneumothorax.    Chest pain non-emergent diagnoses: Musculoskeletal, trauma, pleurisy, pneumonia, pleural effusion, GERD, other GI, neurologic, psychiatric and other non emergent diagnoses considered.        Independently Interpreted Test(s):   I have ordered and independently interpreted X-rays - see prior notes.  I have ordered and independently interpreted EKG Reading(s) - see prior notes  Clinical Tests:   Lab Tests: Ordered and Reviewed  Radiological Study: Ordered and Reviewed  Medical Tests: Ordered and Reviewed  ED Management:  Patient is a 47-year-old female with past medical history hypertension, gastroparesis, kidney donor status post left nephrectomy, presents emergency department for chest pain.  She has a history of pulmonary embolism states pain feels similar.  As a result labs and imaging obtained.  EKG without evident ST elevation.  CTA as noted, no evidence of pulmonary embolism.  Incidental findings include thoracic fracture which may be contributing to the patient's pain the patient denies any recent falls or injuries.  Additionally patient found to have liver nodule.  Right upper quadrant ultrasound obtained without evidence of cholecystitis or gallbladder dysfunction.  On reassessment patient resting comfortably.  No acute distress.  Shared decision making used to  determine disposition.  Patient states she would like to go home.  Reassessed patient.  Patient is resting comfortably.  Discussed all results.  Discussed need for follow-up cardiology and primary care for incidental findings.  Discussed return precautions.  Answered all questions at this time.  Hemodynamically stable for continued outpatient management with strict return precautions.  Patient verbalized understanding agreed to plan.               Clinical Impression:  Final diagnoses:  [R07.9] Chest pain (Primary)  [K76.89] Liver nodule  [S22.059A] Closed fracture of sixth thoracic vertebra, unspecified fracture morphology, initial encounter  [S22.069A] Closed fracture of seventh thoracic vertebra, unspecified fracture morphology, initial encounter  [R11.2] Nausea and vomiting, unspecified vomiting type          ED Disposition Condition    Discharge Stable          ED Prescriptions       Medication Sig Dispense Start Date End Date Auth. Provider    HYDROcodone-acetaminophen (NORCO) 5-325 mg per tablet () Take 1 tablet by mouth every 8 (eight) hours as needed for Pain. 15 tablet 2023 Josiah Mcknight MD    ondansetron (ZOFRAN-ODT) 4 MG TbDL () Take 1 tablet (4 mg total) by mouth every 8 (eight) hours as needed. 15 tablet 2023 Josiah Mcknight MD    promethazine (PHENERGAN) 12.5 MG Tab Take 1 tablet (12.5 mg total) by mouth every 6 (six) hours as needed. 21 tablet 2023 Josiah Mcknight MD          Follow-up Information       Follow up With Specialties Details Why Contact Info    Juancarlos Victoria MD Cardiology   [address]  [place] [address]  [phone]      Primary Care  Call in 1 day  Please call [phone] for a primary care provider.    Yuval Soler MD Gastroenterology   [address]  [place] [address]  [phone]               Josiah Mcknight MD  12/15/23 4809

## 2024-02-06 ENCOUNTER — PATIENT MESSAGE (OUTPATIENT)
Dept: ADMINISTRATIVE | Facility: OTHER | Age: 49
End: 2024-02-06
Payer: MEDICAID

## 2024-02-07 ENCOUNTER — HOSPITAL ENCOUNTER (OUTPATIENT)
Facility: HOSPITAL | Age: 49
Discharge: HOME | End: 2024-02-07
Attending: INTERNAL MEDICINE | Admitting: INTERNAL MEDICINE
Payer: MEDICAID

## 2024-02-07 VITALS
SYSTOLIC BLOOD PRESSURE: 117 MMHG | BODY MASS INDEX: 27.18 KG/M2 | HEART RATE: 71 BPM | RESPIRATION RATE: 19 BRPM | DIASTOLIC BLOOD PRESSURE: 85 MMHG | TEMPERATURE: 98 F | OXYGEN SATURATION: 96 % | WEIGHT: 159.19 LBS | HEIGHT: 64 IN

## 2024-02-07 DIAGNOSIS — R07.9 CHEST PAIN: ICD-10-CM

## 2024-02-07 DIAGNOSIS — Z86.718 PERSONAL HISTORY OF DVT (DEEP VEIN THROMBOSIS): ICD-10-CM

## 2024-02-07 DIAGNOSIS — I10 HTN (HYPERTENSION): ICD-10-CM

## 2024-02-07 LAB
B-HCG UR QL: NEGATIVE
CTP QC/QA: YES

## 2024-02-07 PROCEDURE — 93005 ELECTROCARDIOGRAM TRACING: CPT | Mod: 59

## 2024-02-07 PROCEDURE — 93010 ELECTROCARDIOGRAM REPORT: CPT | Mod: ,,, | Performed by: INTERNAL MEDICINE

## 2024-02-07 PROCEDURE — 63600175 PHARM REV CODE 636 W HCPCS: Performed by: INTERNAL MEDICINE

## 2024-02-07 PROCEDURE — C1769 GUIDE WIRE: HCPCS | Performed by: INTERNAL MEDICINE

## 2024-02-07 PROCEDURE — 99152 MOD SED SAME PHYS/QHP 5/>YRS: CPT | Performed by: INTERNAL MEDICINE

## 2024-02-07 PROCEDURE — 25000003 PHARM REV CODE 250: Performed by: INTERNAL MEDICINE

## 2024-02-07 PROCEDURE — 37193 REM ENDOVAS VENA CAVA FILTER: CPT | Performed by: INTERNAL MEDICINE

## 2024-02-07 PROCEDURE — C1773 RET DEV, INSERTABLE: HCPCS | Performed by: INTERNAL MEDICINE

## 2024-02-07 PROCEDURE — 81025 URINE PREGNANCY TEST: CPT | Performed by: INTERNAL MEDICINE

## 2024-02-07 RX ORDER — FENTANYL CITRATE 50 UG/ML
INJECTION, SOLUTION INTRAMUSCULAR; INTRAVENOUS
Status: DISCONTINUED | OUTPATIENT
Start: 2024-02-07 | End: 2024-02-07 | Stop reason: HOSPADM

## 2024-02-07 RX ORDER — METOPROLOL TARTRATE 50 MG/1
50 TABLET ORAL 2 TIMES DAILY
COMMUNITY
Start: 2023-11-02

## 2024-02-07 RX ORDER — MIDAZOLAM HYDROCHLORIDE 1 MG/ML
INJECTION INTRAMUSCULAR; INTRAVENOUS
Status: DISCONTINUED | OUTPATIENT
Start: 2024-02-07 | End: 2024-02-07 | Stop reason: HOSPADM

## 2024-02-07 RX ORDER — ONDANSETRON 4 MG/1
8 TABLET, ORALLY DISINTEGRATING ORAL EVERY 8 HOURS PRN
Status: DISCONTINUED | OUTPATIENT
Start: 2024-02-07 | End: 2024-02-07 | Stop reason: HOSPADM

## 2024-02-07 RX ORDER — SODIUM CHLORIDE 9 MG/ML
INJECTION, SOLUTION INTRAVENOUS ONCE
Status: COMPLETED | OUTPATIENT
Start: 2024-02-07 | End: 2024-02-07

## 2024-02-07 RX ORDER — SODIUM CHLORIDE 0.9 % (FLUSH) 0.9 %
10 SYRINGE (ML) INJECTION
Status: ACTIVE | OUTPATIENT
Start: 2024-02-07

## 2024-02-07 RX ORDER — HYDROCODONE BITARTRATE AND ACETAMINOPHEN 5; 325 MG/1; MG/1
1 TABLET ORAL EVERY 4 HOURS PRN
Status: DISCONTINUED | OUTPATIENT
Start: 2024-02-07 | End: 2024-02-07 | Stop reason: HOSPADM

## 2024-02-07 RX ORDER — ACETAMINOPHEN 325 MG/1
650 TABLET ORAL EVERY 4 HOURS PRN
Status: DISCONTINUED | OUTPATIENT
Start: 2024-02-07 | End: 2024-02-07 | Stop reason: HOSPADM

## 2024-02-07 RX ORDER — MORPHINE SULFATE 4 MG/ML
2 INJECTION, SOLUTION INTRAMUSCULAR; INTRAVENOUS EVERY 4 HOURS PRN
Status: DISCONTINUED | OUTPATIENT
Start: 2024-02-07 | End: 2024-02-07 | Stop reason: HOSPADM

## 2024-02-07 RX ORDER — DIAZEPAM 5 MG/1
10 TABLET ORAL ONCE
Status: COMPLETED | OUTPATIENT
Start: 2024-02-07 | End: 2024-02-07

## 2024-02-07 RX ORDER — DIPHENHYDRAMINE HCL 50 MG
50 CAPSULE ORAL NIGHTLY PRN
COMMUNITY

## 2024-02-07 RX ORDER — LIDOCAINE HYDROCHLORIDE 10 MG/ML
INJECTION INFILTRATION; PERINEURAL
Status: DISCONTINUED | OUTPATIENT
Start: 2024-02-07 | End: 2024-02-07 | Stop reason: HOSPADM

## 2024-02-07 RX ORDER — HYDRALAZINE HYDROCHLORIDE 20 MG/ML
10 INJECTION INTRAMUSCULAR; INTRAVENOUS EVERY 4 HOURS PRN
Status: DISCONTINUED | OUTPATIENT
Start: 2024-02-07 | End: 2024-02-07 | Stop reason: HOSPADM

## 2024-02-07 RX ORDER — DIPHENHYDRAMINE HCL 25 MG
50 CAPSULE ORAL
Status: DISPENSED | OUTPATIENT
Start: 2024-02-07

## 2024-02-07 RX ORDER — PROGESTERONE 200 MG/1
200 CAPSULE ORAL NIGHTLY
COMMUNITY
Start: 2023-12-04

## 2024-02-07 RX ORDER — FAMOTIDINE 20 MG/1
20 TABLET, FILM COATED ORAL DAILY PRN
COMMUNITY

## 2024-02-07 RX ORDER — SODIUM CHLORIDE 9 MG/ML
INJECTION, SOLUTION INTRAVENOUS CONTINUOUS
Status: DISCONTINUED | OUTPATIENT
Start: 2024-02-07 | End: 2024-02-07 | Stop reason: HOSPADM

## 2024-02-07 RX ORDER — VALACYCLOVIR HYDROCHLORIDE 1 G/1
1000 TABLET, FILM COATED ORAL DAILY PRN
COMMUNITY

## 2024-02-07 RX ADMIN — DIAZEPAM 10 MG: 5 TABLET ORAL at 11:02

## 2024-02-07 RX ADMIN — SODIUM CHLORIDE: 9 INJECTION, SOLUTION INTRAVENOUS at 11:02

## 2024-02-07 RX ADMIN — DIPHENHYDRAMINE HYDROCHLORIDE 50 MG: 25 CAPSULE ORAL at 11:02

## 2024-02-07 NOTE — PLAN OF CARE
Ambulated to BR; tolerated well; DC instructions reviewed. Questions answered and encouraged.  Dressing for pending DC - call to  to .

## 2024-02-07 NOTE — DISCHARGE INSTRUCTIONS
-Remove dressing in 24hrs  -No driving for two Days  -Do not lift anything heavier than a gallon of milk for 5 days  -No tub bathing for 5 days (if you have a groin site).   -No lotions, powders, creams around site for 5 days  - Return to the nearest emergency room if you start running a fever; have any kind of discharge coming from the site, the site looks red or swollen.  - If site starts to bleed, lay flat on the ground, apply pressure to the site and call 911.   Resume Xarelto tomorrow

## 2024-02-08 ENCOUNTER — PATIENT MESSAGE (OUTPATIENT)
Dept: ADMINISTRATIVE | Facility: OTHER | Age: 49
End: 2024-02-08
Payer: MEDICAID

## 2024-02-08 LAB
OHS QRS DURATION: 74 MS
OHS QTC CALCULATION: 444 MS

## 2024-02-09 ENCOUNTER — PATIENT MESSAGE (OUTPATIENT)
Dept: ADMINISTRATIVE | Facility: OTHER | Age: 49
End: 2024-02-09
Payer: MEDICAID

## 2024-03-01 NOTE — DISCHARGE SUMMARY
[facility] - Cath Lab Services  Discharge Note  Short Stay    Procedure(s) (LRB):  REMOVAL, FILTER, INFERIOR VENA CAVA (N/A)      OUTCOME: Patient tolerated treatment/procedure well without complication and is now ready for discharge.    DISPOSITION: Home or Self Care    FINAL DIAGNOSIS:  IVC filter removal  FOLLOWUP: In clinic    DISCHARGE INSTRUCTIONS:    Discharge Procedure Orders   POCT urine pregnancy        TIME SPENT ON DISCHARGE: 30 minutes

## 2024-03-01 NOTE — DISCHARGE SUMMARY
[facility] - Cath Lab Services  Discharge Note  Short Stay    Procedure(s) (LRB):  REMOVAL, FILTER, INFERIOR VENA CAVA (N/A)      OUTCOME: Patient tolerated treatment/procedure well without complication and is now ready for discharge.    DISPOSITION: Home or Self Care    FINAL DIAGNOSIS:  IVC removal    FOLLOWUP: In clinic    DISCHARGE INSTRUCTIONS:    Discharge Procedure Orders   POCT urine pregnancy        TIME SPENT ON DISCHARGE: 30 minutes

## 2024-03-05 ENCOUNTER — OFFICE VISIT (OUTPATIENT)
Dept: INTERNAL MEDICINE | Facility: CLINIC | Age: 49
End: 2024-03-05
Payer: MEDICAID

## 2024-03-05 ENCOUNTER — TELEPHONE (OUTPATIENT)
Dept: INTERNAL MEDICINE | Facility: CLINIC | Age: 49
End: 2024-03-05
Payer: MEDICAID

## 2024-03-05 VITALS
OXYGEN SATURATION: 96 % | WEIGHT: 159.38 LBS | RESPIRATION RATE: 18 BRPM | DIASTOLIC BLOOD PRESSURE: 86 MMHG | TEMPERATURE: 98 F | SYSTOLIC BLOOD PRESSURE: 118 MMHG | HEIGHT: 64 IN | BODY MASS INDEX: 27.21 KG/M2 | HEART RATE: 93 BPM

## 2024-03-05 DIAGNOSIS — Z86.711 H/O PULMONARY EMBOLUS DURING PREGNANCY: ICD-10-CM

## 2024-03-05 DIAGNOSIS — G47.10 HYPERSOMNOLENCE DISORDER, PERSISTENT: ICD-10-CM

## 2024-03-05 DIAGNOSIS — Z23 NEED FOR VACCINATION: ICD-10-CM

## 2024-03-05 DIAGNOSIS — I51.7 RVH (RIGHT VENTRICULAR HYPERTROPHY): ICD-10-CM

## 2024-03-05 DIAGNOSIS — R20.0 NUMBNESS AND TINGLING OF BOTH FEET: ICD-10-CM

## 2024-03-05 DIAGNOSIS — Z00.00 ROUTINE GENERAL MEDICAL EXAMINATION AT HEALTH CARE FACILITY: Primary | ICD-10-CM

## 2024-03-05 DIAGNOSIS — R20.2 NUMBNESS AND TINGLING OF BOTH FEET: ICD-10-CM

## 2024-03-05 DIAGNOSIS — Z87.59 H/O PULMONARY EMBOLUS DURING PREGNANCY: ICD-10-CM

## 2024-03-05 DIAGNOSIS — I26.99 PULMONARY EMBOLISM, UNSPECIFIED CHRONICITY, UNSPECIFIED PULMONARY EMBOLISM TYPE, UNSPECIFIED WHETHER ACUTE COR PULMONALE PRESENT: ICD-10-CM

## 2024-03-05 DIAGNOSIS — H65.93 BILATERAL NON-SUPPURATIVE OTITIS MEDIA: ICD-10-CM

## 2024-03-05 DIAGNOSIS — S22.000A COMPRESSION FRACTURE OF BODY OF THORACIC VERTEBRA: ICD-10-CM

## 2024-03-05 PROCEDURE — 90677 PCV20 VACCINE IM: CPT | Mod: PBBFAC

## 2024-03-05 PROCEDURE — 99215 OFFICE O/P EST HI 40 MIN: CPT | Mod: PBBFAC | Performed by: INTERNAL MEDICINE

## 2024-03-05 RX ORDER — PHENIRAMINE MALEATE, PHENYLEPHRINE HCL 17.5; 1 MG/1; MG/1
1 TABLET ORAL EVERY 4 HOURS
COMMUNITY
Start: 2024-02-27

## 2024-03-05 RX ORDER — LANOLIN ALCOHOL/MO/W.PET/CERES
100 CREAM (GRAM) TOPICAL DAILY
COMMUNITY

## 2024-03-05 RX ORDER — PROMETHAZINE HYDROCHLORIDE 12.5 MG/1
12.5 TABLET ORAL EVERY 6 HOURS PRN
COMMUNITY

## 2024-03-05 RX ORDER — FLUTICASONE PROPIONATE 50 MCG
1 SPRAY, SUSPENSION (ML) NASAL DAILY
COMMUNITY
Start: 2024-02-27

## 2024-03-05 RX ORDER — CETIRIZINE HYDROCHLORIDE 5 MG/1
5 TABLET ORAL DAILY
COMMUNITY

## 2024-03-05 RX ORDER — OMEPRAZOLE 20 MG/1
20 CAPSULE, DELAYED RELEASE ORAL DAILY
COMMUNITY

## 2024-03-05 RX ORDER — AZITHROMYCIN 250 MG/1
TABLET, FILM COATED ORAL
Qty: 6 TABLET | Refills: 0 | Status: SHIPPED | OUTPATIENT
Start: 2024-03-05 | End: 2024-03-10

## 2024-03-05 RX ORDER — CIPROFLOXACIN AND DEXAMETHASONE 3; 1 MG/ML; MG/ML
4 SUSPENSION/ DROPS AURICULAR (OTIC) 2 TIMES DAILY
COMMUNITY
Start: 2024-02-27

## 2024-03-05 NOTE — PROGRESS NOTES
Subjective     Patient ID: Nahed Harvey is a 48 y.o. female.    Chief Complaint: Establish Care    48-year-old white female with complicated history.  Extremely nice lady who in 2022 had massive pulmonary embolus with right heart failure requiring percutaneous thrombectomy per Dr. Victoria, IVC filter placement, and also had right DVT.  She was on oral contraception at that time but is no longer on such.  Other diagnoses include hypertension gastroparesis which comes and goes history of nephrectomy for kidney donation to her father liver lesion 1 cm right lobe of her liver which is felt to be benign but will need follow-up compression deformity of T5-T6 lumbar spine meningioma diagnosed frontal lobe area September of 2022-actually diagnosed prior but follow-up September of 2022 was stable from 5 years prior, chronic kidney disease G3 a with a GFR of 57 abnormal echo with her pulmonary embolus it has not been follow-up upon, history of being an ex-smoker quitting 10 years ago in 2012 which still smokes some at a low level especially since her mother passed away or was diagnosed with Alzheimer's disease some neuropathic pain or neuropathy of her lower of her feet bilaterally with numbness and tingling.  She is still on Xarelto    She was diagnosed with fullness in her ears and on physical exam now has tympanic membrane with myringitis right greater than left but both abnormal still has fullness and some sinus congestion it is remarkable that she has hypersomnolence and snoring daytime hypersomnolence especially when reading being quiet watching television in is not rested when she awakens from sleep.    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 is in need of colonoscopy screening which she prefers more than Cologuard is in need of a mammogram    Our plan is to get lumbar spine x-rays DEXA scan because of her compression fraction and a PTH mammogram refer for colonoscopy per   Phil in [place] we will follow up with MRI of the brain in the future as well as get follow-up for this liver lesion in the future repeat echo now to assess her heart I have seen her EKG she is being referred for Ophthalmology routine labs to include uric acid because of her history of gout A1c Pneumovax today Z-Immanuel today for her ears she will call us in follow-up if they do not clear up order a sleep study get ABIs could not feel pulses in her feet very well follow-up in 3 months  Review of Systems   All other systems reviewed and are negative.         Objective     Physical Exam  Vitals and nursing note reviewed.   Constitutional:       Appearance: Normal appearance.   HENT:      Head: Normocephalic and atraumatic.      Right Ear: Tympanic membrane, ear canal and external ear normal.      Left Ear: Tympanic membrane, ear canal and external ear normal.      Nose: Nose normal.      Mouth/Throat:      Mouth: Mucous membranes are dry.      Pharynx: Oropharynx is clear.   Eyes:      Extraocular Movements: Extraocular movements intact.      Conjunctiva/sclera: Conjunctivae normal.      Pupils: Pupils are equal, round, and reactive to light.   Cardiovascular:      Rate and Rhythm: Normal rate and regular rhythm.      Pulses: Normal pulses.      Heart sounds: Normal heart sounds.   Pulmonary:      Effort: Pulmonary effort is normal.      Breath sounds: Normal breath sounds.   Abdominal:      General: Bowel sounds are normal.      Palpations: Abdomen is soft.   Musculoskeletal:      Cervical back: Normal range of motion and neck supple.      Comments: Foot diabetic foot exam was normal good sensation but I could not feel her dorsal pedal posterior tibial very well so we will get ABIs   Skin:     General: Skin is warm and dry.   Neurological:      General: No focal deficit present.      Mental Status: She is alert and oriented to person, place, and time. Mental status is at baseline.   Psychiatric:         Mood and  Affect: Mood normal.         Behavior: Behavior normal.         Thought Content: Thought content normal.         Judgment: Judgment normal.            Assessment and Plan     1. Routine general medical examination at health care facility  -     pneumoc 20-dell conj-dip cr(PF) (PREVNAR-20 (PF)) injection Syrg 0.5 mL  -     Mammo Digital Screening Bilat w/ Stalin; Future; Expected date: 03/05/2024  -     Ambulatory referral/consult to Gastroenterology; Future; Expected date: 04/05/2024  -     Ambulatory referral/consult to Ophthalmology; Future; Expected date: 04/05/2024  -     Hemoglobin A1C; Future; Expected date: 03/05/2024  -     Lipid Panel; Future; Expected date: 03/05/2024  -     Uric Acid; Future; Expected date: 03/05/2024  -     Cancel: Urinalysis  -     Cancel: Urinalysis; Future; Expected date: 03/05/2024  -     Urinalysis; Future; Expected date: 03/05/2024    2. Need for vaccination  -     pneumoc 20-dell conj-dip cr(PF) (PREVNAR-20 (PF)) injection Syrg 0.5 mL    3. Bilateral non-suppurative otitis media  -     azithromycin (Z-TERE) 250 MG tablet; Take 2 tablets by mouth on day 1; Take 1 tablet by mouth on days 2-5  Dispense: 6 tablet; Refill: 0    4. Compression fracture of body of thoracic vertebra  -     Cancel: X-Ray Lumbar Spine 2 Or 3 Views; Future; Expected date: 03/05/2024  -     X-Ray Thoracic Spine AP Lateral; Future; Expected date: 03/05/2024  -     DXA Bone Density Axial Skeleton 1 or more sites; Future; Expected date: 03/05/2024  -     PTH, Intact; Future; Expected date: 03/05/2024  -     X-Ray Lumbar Spine 2 Or 3 Views; Future; Expected date: 03/05/2024    5. RVH (right ventricular hypertrophy)  -     Echo Saline Bubble? No; Future; Expected date: 03/19/2024    6. H/O pulmonary embolus during pregnancy  -     Comprehensive Metabolic Panel; Future; Expected date: 03/05/2024  -     CBC Auto Differential; Future; Expected date: 03/05/2024    7. Pulmonary embolism, unspecified chronicity, unspecified  pulmonary embolism type, unspecified whether acute cor pulmonale present  -     D-Dimer, Quantitative; Future; Expected date: 03/05/2024    8. Hypersomnolence disorder, persistent  -     Polysomnography 4 or more parameters; Future; Expected date: 04/05/2024    9. Numbness and tingling of both feet  -     Ankle Brachial Indices (JAYESH); Future; Expected date: 03/19/2024  -     CV Ultrasound doppler arterial legs bilat; Future; Expected date: 03/19/2024    Other orders  -     (In Office Administered) Pneumococcal Conjugate Vaccine (20 Valent) (IM) (Preferred)        Problems include history of pulmonary embolus DVT IVC filter that has been removed chronic kidney disease G3 a nephrectomy donated kidney to her father gastroparesis hypertension liver lesion 1 cm compression deformity in T-spine T5 and T6 meningioma abnormal echo at the time of her pulmonary embolus cigarette smoker otitis media bilaterally can not feel pulses in her feet numbness in her feet diagnosis neuropathy of unclear type abnormal oropharynx on physical exam with history of hypersomnolence and snoring need for mammogram ophthalmology exam sleep study other labs as above thank you    Follow up in 3 months         Follow up in about 3 months (around 6/5/2024).

## 2024-03-05 NOTE — TELEPHONE ENCOUNTER
----- Message from Liliam Mcqueen sent at 3/5/2024  2:41 PM CST -----  Regarding: New order  Good afternoon,     We are unable to schedule this test, Ankle Brachial Indices (JAYESH). This test is ordered as CV US Doppler Arterial Leg Left, CV US Doppler Arterial Leg Right, or CV US Doppler Arterial Legs Bilateral. Please send new orders at your earliest convenience.     Thank you,   Liliam Mcqueen

## 2024-03-18 ENCOUNTER — PROCEDURE VISIT (OUTPATIENT)
Dept: SLEEP MEDICINE | Facility: HOSPITAL | Age: 49
End: 2024-03-18
Attending: INTERNAL MEDICINE
Payer: MEDICAID

## 2024-03-18 DIAGNOSIS — G47.33 OSA (OBSTRUCTIVE SLEEP APNEA): Primary | ICD-10-CM

## 2024-03-18 DIAGNOSIS — G47.33 OSA (OBSTRUCTIVE SLEEP APNEA): ICD-10-CM

## 2024-03-18 PROCEDURE — 95810 POLYSOM 6/> YRS 4/> PARAM: CPT

## 2024-03-19 RX ORDER — FUROSEMIDE 20 MG/1
20 TABLET ORAL DAILY
Qty: 30 TABLET | Refills: 0 | Status: SHIPPED | OUTPATIENT
Start: 2024-03-19 | End: 2024-04-10 | Stop reason: SDUPTHER

## 2024-03-19 NOTE — TELEPHONE ENCOUNTER
TELEPHONE MESSAGE:      DR. PEREA,     MS. ELIAS IS ASKING THAT SHE BE CALLED WITH HER XRAY RESULTS.

## 2024-03-27 ENCOUNTER — HOSPITAL ENCOUNTER (OUTPATIENT)
Dept: RADIOLOGY | Facility: HOSPITAL | Age: 49
Discharge: HOME | End: 2024-03-27
Attending: INTERNAL MEDICINE
Payer: MEDICAID

## 2024-03-27 DIAGNOSIS — Z00.00 ROUTINE GENERAL MEDICAL EXAMINATION AT HEALTH CARE FACILITY: ICD-10-CM

## 2024-03-27 DIAGNOSIS — S22.000A COMPRESSION FRACTURE OF BODY OF THORACIC VERTEBRA: ICD-10-CM

## 2024-03-27 PROCEDURE — 77063 BREAST TOMOSYNTHESIS BI: CPT | Mod: 26,,, | Performed by: RADIOLOGY

## 2024-03-27 PROCEDURE — 77067 SCR MAMMO BI INCL CAD: CPT | Mod: TC

## 2024-03-27 PROCEDURE — 77080 DXA BONE DENSITY AXIAL: CPT | Mod: TC

## 2024-03-27 PROCEDURE — 72100 X-RAY EXAM L-S SPINE 2/3 VWS: CPT | Mod: TC

## 2024-03-27 PROCEDURE — 77067 SCR MAMMO BI INCL CAD: CPT | Mod: 26,,, | Performed by: RADIOLOGY

## 2024-03-27 PROCEDURE — 72070 X-RAY EXAM THORAC SPINE 2VWS: CPT | Mod: TC

## 2024-04-09 ENCOUNTER — DOCUMENTATION ONLY (OUTPATIENT)
Dept: INTERNAL MEDICINE | Facility: CLINIC | Age: 49
End: 2024-04-09
Payer: MEDICAID

## 2024-04-11 RX ORDER — FUROSEMIDE 20 MG/1
20 TABLET ORAL DAILY
Qty: 30 TABLET | Refills: 0 | Status: SHIPPED | OUTPATIENT
Start: 2024-04-11 | End: 2024-06-03

## 2024-05-14 ENCOUNTER — HOSPITAL ENCOUNTER (OUTPATIENT)
Dept: RADIOLOGY | Facility: HOSPITAL | Age: 49
Discharge: HOME | End: 2024-05-14
Attending: INTERNAL MEDICINE
Payer: MEDICAID

## 2024-05-14 ENCOUNTER — HOSPITAL ENCOUNTER (OUTPATIENT)
Dept: CARDIOLOGY | Facility: HOSPITAL | Age: 49
Discharge: HOME | End: 2024-05-14
Attending: INTERNAL MEDICINE
Payer: MEDICAID

## 2024-05-14 VITALS
WEIGHT: 159 LBS | HEIGHT: 64 IN | DIASTOLIC BLOOD PRESSURE: 87 MMHG | BODY MASS INDEX: 27.14 KG/M2 | SYSTOLIC BLOOD PRESSURE: 129 MMHG

## 2024-05-14 DIAGNOSIS — R20.0 NUMBNESS AND TINGLING OF BOTH FEET: ICD-10-CM

## 2024-05-14 DIAGNOSIS — R20.2 NUMBNESS AND TINGLING OF BOTH FEET: ICD-10-CM

## 2024-05-14 DIAGNOSIS — I51.7 RVH (RIGHT VENTRICULAR HYPERTROPHY): ICD-10-CM

## 2024-05-14 LAB
AV INDEX (PROSTH): 0.82
AV MEAN GRADIENT: 6 MMHG
AV PEAK GRADIENT: 10 MMHG
AV VALVE AREA BY VELOCITY RATIO: 2.47 CM²
AV VALVE AREA: 2.44 CM²
AV VELOCITY RATIO: 0.83
BSA FOR ECHO PROCEDURE: 1.8 M2
CV ECHO LV RWT: 0.46 CM
DOP CALC AO PEAK VEL: 1.58 M/S
DOP CALC AO VTI: 31.3 CM
DOP CALC LVOT AREA: 3 CM2
DOP CALC LVOT DIAMETER: 1.95 CM
DOP CALC LVOT PEAK VEL: 1.31 M/S
DOP CALC LVOT STROKE VOLUME: 76.42 CM3
DOP CALC MV VTI: 27.6 CM
DOP CALCLVOT PEAK VEL VTI: 25.6 CM
E WAVE DECELERATION TIME: 148.14 MSEC
E/A RATIO: 1.51
ECHO LV POSTERIOR WALL: 1.02 CM (ref 0.6–1.1)
FRACTIONAL SHORTENING: 44 % (ref 28–44)
HR MV ECHO: 68 BPM
IMMEDIATE ARM BP: 115 MMHG
IMMEDIATE LEFT ABI: 1.06
IMMEDIATE LEFT TIBIAL: 122 MMHG
IMMEDIATE RIGHT ABI: 1.05
IMMEDIATE RIGHT TIBIAL: 121 MMHG
INTERVENTRICULAR SEPTUM: 1.05 CM (ref 0.6–1.1)
IVC DIAMETER: 2.2 CM
LEFT ABI: 1.25
LEFT ARM BP: 111 MMHG
LEFT ATRIUM SIZE: 3.18 CM
LEFT ATRIUM VOLUME INDEX MOD: 22.6 ML/M2
LEFT ATRIUM VOLUME MOD: 40.09 CM3
LEFT DORSALIS PEDIS: 133 MMHG
LEFT INTERNAL DIMENSION IN SYSTOLE: 2.48 CM (ref 2.1–4)
LEFT POSTERIOR TIBIAL: 139 MMHG
LEFT VENTRICLE DIASTOLIC VOLUME INDEX: 49.54 ML/M2
LEFT VENTRICLE DIASTOLIC VOLUME: 87.69 ML
LEFT VENTRICLE MASS INDEX: 88 G/M2
LEFT VENTRICLE SYSTOLIC VOLUME INDEX: 12.4 ML/M2
LEFT VENTRICLE SYSTOLIC VOLUME: 21.95 ML
LEFT VENTRICULAR INTERNAL DIMENSION IN DIASTOLE: 4.4 CM (ref 3.5–6)
LEFT VENTRICULAR MASS: 155.07 G
LV LATERAL E/E' RATIO: 7.55 M/S
LVOT MG: 3.24 MMHG
LVOT MV: 0.83 CM/S
MV MEAN GRADIENT: 2 MMHG
MV PEAK A VEL: 0.55 M/S
MV PEAK E VEL: 0.83 M/S
MV PEAK GRADIENT: 4 MMHG
MV VALVE AREA BY CONTINUITY EQUATION: 2.77 CM2
OHS LV EJECTION FRACTION SIMPSONS BIPLANE MOD: 69 %
PISA MRMAX VEL: 4.43 M/S
PISA TR MAX VEL: 2.97 M/S
RA MAJOR: 5.03 CM
RA PRESSURE ESTIMATED: 8 MMHG
RIGHT ABI: 1.22
RIGHT ARM BP: 104 MMHG
RIGHT DORSALIS PEDIS: 132 MMHG
RIGHT POSTERIOR TIBIAL: 135 MMHG
RV TB RVSP: 11 MMHG
TDI LATERAL: 0.11 M/S
TOE RAISES: 40
TR MAX PG: 35 MMHG
TRICUSPID ANNULAR PLANE SYSTOLIC EXCURSION: 2.05 CM
TV REST PULMONARY ARTERY PRESSURE: 43 MMHG
Z-SCORE OF LEFT VENTRICULAR DIMENSION IN END DIASTOLE: -1.06
Z-SCORE OF LEFT VENTRICULAR DIMENSION IN END SYSTOLE: -1.57

## 2024-05-14 PROCEDURE — 93306 TTE W/DOPPLER COMPLETE: CPT

## 2024-05-14 PROCEDURE — 93924 LWR XTR VASC STDY BILAT: CPT

## 2024-06-03 RX ORDER — FUROSEMIDE 20 MG/1
20 TABLET ORAL
Qty: 30 TABLET | Refills: 3 | Status: SHIPPED | OUTPATIENT
Start: 2024-06-03

## 2024-06-10 PROBLEM — Z00.00 ROUTINE GENERAL MEDICAL EXAMINATION AT HEALTH CARE FACILITY: Status: RESOLVED | Noted: 2024-03-05 | Resolved: 2024-06-10

## 2024-07-24 ENCOUNTER — DOCUMENTATION ONLY (OUTPATIENT)
Dept: INTERNAL MEDICINE | Facility: CLINIC | Age: 49
End: 2024-07-24
Payer: MEDICAID

## 2024-07-24 LAB — EGD FOLLOW UP EXTERNAL: NORMAL

## 2024-08-22 ENCOUNTER — TELEPHONE (OUTPATIENT)
Dept: INTERNAL MEDICINE | Facility: CLINIC | Age: 49
End: 2024-08-22
Payer: MEDICAID

## 2024-08-22 ENCOUNTER — HOSPITAL ENCOUNTER (EMERGENCY)
Facility: HOSPITAL | Age: 49
Discharge: HOME | End: 2024-08-22
Attending: STUDENT IN AN ORGANIZED HEALTH CARE EDUCATION/TRAINING PROGRAM
Payer: MEDICAID

## 2024-08-22 VITALS
RESPIRATION RATE: 18 BRPM | BODY MASS INDEX: 28.24 KG/M2 | DIASTOLIC BLOOD PRESSURE: 92 MMHG | TEMPERATURE: 98 F | HEART RATE: 67 BPM | WEIGHT: 165.38 LBS | HEIGHT: 64 IN | OXYGEN SATURATION: 99 % | SYSTOLIC BLOOD PRESSURE: 153 MMHG

## 2024-08-22 DIAGNOSIS — R53.1 GENERALIZED WEAKNESS: ICD-10-CM

## 2024-08-22 DIAGNOSIS — R05.9 COUGH: Primary | ICD-10-CM

## 2024-08-22 DIAGNOSIS — R53.83 FATIGUE, UNSPECIFIED TYPE: ICD-10-CM

## 2024-08-22 LAB
ALBUMIN SERPL-MCNC: 3.5 G/DL (ref 3.5–5)
ALBUMIN/GLOB SERPL: 0.9 RATIO (ref 1.1–2)
ALP SERPL-CCNC: 88 UNIT/L (ref 40–150)
ALT SERPL-CCNC: 26 UNIT/L (ref 0–55)
ANION GAP SERPL CALC-SCNC: 12 MEQ/L
AST SERPL-CCNC: 36 UNIT/L (ref 5–34)
BASOPHILS # BLD AUTO: 0.1 X10(3)/MCL
BASOPHILS NFR BLD AUTO: 1.4 %
BILIRUB SERPL-MCNC: 0.3 MG/DL
BUN SERPL-MCNC: 8.4 MG/DL (ref 9.8–20.1)
CALCIUM SERPL-MCNC: 9.2 MG/DL (ref 8.4–10.2)
CHLORIDE SERPL-SCNC: 99 MMOL/L (ref 98–107)
CO2 SERPL-SCNC: 24 MMOL/L (ref 22–29)
CREAT SERPL-MCNC: 0.86 MG/DL (ref 0.55–1.02)
CREAT/UREA NIT SERPL: 10
EOSINOPHIL # BLD AUTO: 0.19 X10(3)/MCL (ref 0–0.9)
EOSINOPHIL NFR BLD AUTO: 2.7 %
ERYTHROCYTE [DISTWIDTH] IN BLOOD BY AUTOMATED COUNT: 11.9 % (ref 11.5–17)
GFR SERPLBLD CREATININE-BSD FMLA CKD-EPI: >60 ML/MIN/1.73/M2
GLOBULIN SER-MCNC: 3.7 GM/DL (ref 2.4–3.5)
GLUCOSE SERPL-MCNC: 90 MG/DL (ref 74–100)
HCT VFR BLD AUTO: 40.8 % (ref 37–47)
HGB BLD-MCNC: 14.2 G/DL (ref 12–16)
HOLD SPECIMEN: NORMAL
IMM GRANULOCYTES # BLD AUTO: 0.03 X10(3)/MCL (ref 0–0.04)
IMM GRANULOCYTES NFR BLD AUTO: 0.4 %
LIPASE SERPL-CCNC: 23 U/L
LYMPHOCYTES # BLD AUTO: 3.16 X10(3)/MCL (ref 0.6–4.6)
LYMPHOCYTES NFR BLD AUTO: 45.3 %
MCH RBC QN AUTO: 35.5 PG (ref 27–31)
MCHC RBC AUTO-ENTMCNC: 34.8 G/DL (ref 33–36)
MCV RBC AUTO: 102 FL (ref 80–94)
MONOCYTES # BLD AUTO: 0.44 X10(3)/MCL (ref 0.1–1.3)
MONOCYTES NFR BLD AUTO: 6.3 %
NEUTROPHILS # BLD AUTO: 3.05 X10(3)/MCL (ref 2.1–9.2)
NEUTROPHILS NFR BLD AUTO: 43.9 %
NRBC BLD AUTO-RTO: 0 %
PLATELET # BLD AUTO: 238 X10(3)/MCL (ref 130–400)
PMV BLD AUTO: 10.5 FL (ref 7.4–10.4)
POTASSIUM SERPL-SCNC: 4.3 MMOL/L (ref 3.5–5.1)
PROT SERPL-MCNC: 7.2 GM/DL (ref 6.4–8.3)
RBC # BLD AUTO: 4 X10(6)/MCL (ref 4.2–5.4)
SODIUM SERPL-SCNC: 135 MMOL/L (ref 136–145)
WBC # BLD AUTO: 6.97 X10(3)/MCL (ref 4.5–11.5)

## 2024-08-22 PROCEDURE — 85025 COMPLETE CBC W/AUTO DIFF WBC: CPT | Performed by: PHYSICIAN ASSISTANT

## 2024-08-22 PROCEDURE — 99284 EMERGENCY DEPT VISIT MOD MDM: CPT | Mod: 25

## 2024-08-22 PROCEDURE — 63600175 PHARM REV CODE 636 W HCPCS: Performed by: PHYSICIAN ASSISTANT

## 2024-08-22 PROCEDURE — 96361 HYDRATE IV INFUSION ADD-ON: CPT

## 2024-08-22 PROCEDURE — 25000003 PHARM REV CODE 250: Performed by: PHYSICIAN ASSISTANT

## 2024-08-22 PROCEDURE — 80053 COMPREHEN METABOLIC PANEL: CPT | Performed by: PHYSICIAN ASSISTANT

## 2024-08-22 PROCEDURE — 96374 THER/PROPH/DIAG INJ IV PUSH: CPT

## 2024-08-22 PROCEDURE — 83690 ASSAY OF LIPASE: CPT | Performed by: PHYSICIAN ASSISTANT

## 2024-08-22 RX ORDER — BUTALBITAL, ACETAMINOPHEN AND CAFFEINE 50; 325; 40 MG/1; MG/1; MG/1
1 TABLET ORAL
Status: COMPLETED | OUTPATIENT
Start: 2024-08-22 | End: 2024-08-22

## 2024-08-22 RX ORDER — ONDANSETRON 4 MG/1
4 TABLET, ORALLY DISINTEGRATING ORAL EVERY 8 HOURS PRN
Qty: 20 TABLET | Refills: 0 | Status: SHIPPED | OUTPATIENT
Start: 2024-08-22

## 2024-08-22 RX ORDER — ONDANSETRON HYDROCHLORIDE 2 MG/ML
4 INJECTION, SOLUTION INTRAVENOUS
Status: COMPLETED | OUTPATIENT
Start: 2024-08-22 | End: 2024-08-22

## 2024-08-22 RX ADMIN — BUTALBITAL, ACETAMINOPHEN, AND CAFFEINE 1 TABLET: 50; 325; 40 TABLET ORAL at 03:08

## 2024-08-22 RX ADMIN — SODIUM CHLORIDE 1000 ML: 9 INJECTION, SOLUTION INTRAVENOUS at 03:08

## 2024-08-22 RX ADMIN — ONDANSETRON 4 MG: 2 INJECTION INTRAMUSCULAR; INTRAVENOUS at 03:08

## 2024-08-22 NOTE — ED PROVIDER NOTES
"Encounter Date: 8/22/2024       History     Chief Complaint   Patient presents with    Fatigue     "I had covid last week and I just can't recover". Reports generalized fatigue, no energy, headache, cough, and generalized weakness. Denies fever currently.      Nahed Harvey is a 48 y.o. female with a history of HTN who presents to the ED complaining of generalized fatigue, headache, cough, and nausea x 1 week. Reports being diagnosed with COVID over 8 days ago and has not felt like herself since. She was having a lot of nausea and vomiting that have now resolved. She was not eating or drinking much but has been over the last few days. She denies fevers, chills, chest pain, SOB, N/V.    The history is provided by the patient.     Review of patient's allergies indicates:  No Known Allergies  Past Medical History:   Diagnosis Date    Gastroparesis     Hypertension      Past Surgical History:   Procedure Laterality Date    INSERTION OF INFERIOR VENA CAVAL FILTER N/A 9/16/2022    Procedure: Insertion, Filter, Inferior Vena Cava;  Surgeon: Juancarlos Victoria MD;  Location: [facility] CATH [facility];  Service: Cardiology;  Laterality: N/A;    IVC FILTER RETRIEVAL N/A 2/7/2024    Procedure: REMOVAL, FILTER, INFERIOR VENA CAVA;  Surgeon: Juancarlos Victoria MD;  Location: [facility] CATH [facility];  Service: Cardiology;  Laterality: N/A;  IVC FILTER REMOVAL    NEPHRECTOMY      PERCUTANEOUS MECHANICAL THROMBECTOMY OF VASCULAR GRAFT OF UPPER EXTREMITY  9/14/2022    Procedure: THROMBECTOMY, MECHANICAL, VASCULAR GRAFT, UPPER EXTREMITY, PERCUTANEOUS;  Surgeon: Juancarlos Victoria MD;  Location: [facility] CATH [facility];  Service: Cardiology;;    PHLEBOGRAPHY  9/16/2022    Procedure: VENOGRAM;  Surgeon: Juancarlos Victoria MD;  Location: [facility] CATH [facility];  Service: Cardiology;;    THROMBECTOMY  9/14/2022    Procedure: THROMBECTOMY;  Surgeon: Juancarlos Victoria MD;  Location: [facility] CATH [facility];  Service: Cardiology;;     No family history on file.  Social History     Tobacco Use    Smoking status: Some " Days     Types: Cigarettes   Substance Use Topics    Alcohol use: Yes     Comment: RARELY    Drug use: Never     Review of Systems   Constitutional:  Positive for fatigue. Negative for fever.   HENT:  Negative for sore throat.    Respiratory:  Positive for cough. Negative for shortness of breath.    Cardiovascular:  Negative for chest pain.   Gastrointestinal:  Positive for nausea. Negative for abdominal pain.   Genitourinary:  Negative for dysuria.   Musculoskeletal:  Negative for back pain.   Skin:  Negative for rash.   Neurological:  Positive for headaches. Negative for weakness.   Hematological:  Does not bruise/bleed easily.       Physical Exam     Initial Vitals [08/22/24 1355]   BP Pulse Resp Temp SpO2   122/86 89 20 97.7 °F (36.5 °C) 97 %      MAP       --         Physical Exam    Nursing note and vitals reviewed.  Constitutional: She appears well-developed and well-nourished. No distress.   HENT:   Head: Normocephalic and atraumatic.   Mouth/Throat: No oropharyngeal exudate.   Eyes: EOM are normal. No scleral icterus.   Neck: Neck supple.   Normal range of motion.  Cardiovascular:  Normal rate and regular rhythm.           No murmur heard.  Pulmonary/Chest: Breath sounds normal. No respiratory distress. She has no wheezes.   Abdominal: Abdomen is soft. Bowel sounds are normal. She exhibits no distension. There is no abdominal tenderness.   Musculoskeletal:         General: No tenderness. Normal range of motion.      Cervical back: Normal range of motion and neck supple.     Neurological: She is alert and oriented to person, place, and time. No cranial nerve deficit.   Skin: Skin is warm and dry. Capillary refill takes less than 2 seconds. No erythema.   Psychiatric: She has a normal mood and affect. Her behavior is normal. Judgment and thought content normal.         ED Course   Procedures  Labs Reviewed   COMPREHENSIVE METABOLIC PANEL - Abnormal       Result Value    Sodium 135 (*)     Potassium 4.3       Chloride 99      CO2 24      Glucose 90      Blood Urea Nitrogen 8.4 (*)     Creatinine 0.86      Calcium 9.2      Protein Total 7.2      Albumin 3.5      Globulin 3.7 (*)     Albumin/Globulin Ratio 0.9 (*)     Bilirubin Total 0.3      ALP 88      ALT 26      AST 36 (*)     eGFR >60      Anion Gap 12.0      BUN/Creatinine Ratio 10     CBC WITH DIFFERENTIAL - Abnormal    WBC 6.97      RBC 4.00 (*)     Hgb 14.2      Hct 40.8      .0 (*)     MCH 35.5 (*)     MCHC 34.8      RDW 11.9      Platelet 238      MPV 10.5 (*)     Neut % 43.9      Lymph % 45.3      Mono % 6.3      Eos % 2.7      Basophil % 1.4      Lymph # 3.16      Neut # 3.05      Mono # 0.44      Eos # 0.19      Baso # 0.10      IG# 0.03      IG% 0.4      NRBC% 0.0     LIPASE - Normal    Lipase Level 23     CBC W/ AUTO DIFFERENTIAL    Narrative:     The following orders were created for panel order CBC auto differential.  Procedure                               Abnormality         Status                     ---------                               -----------         ------                     CBC with Differential[[id number]]       Abnormal            Final result                 Please view results for these tests on the individual orders.   EXTRA TUBES    Narrative:     The following orders were created for panel order EXTRA TUBES.  Procedure                               Abnormality         Status                     ---------                               -----------         ------                     Light Blue Top Hold[[id number]]                             In process                 Light Green Top Hold[[id number]]                            In process                 Lavender Top Hold[[id number]]                               In process                 Gold Top Hold[[id number]]                                   In process                 Pink Top Hold[[id number]]                                   In process                   Please view  results for these tests on the individual orders.   LIGHT BLUE TOP HOLD   LIGHT GREEN TOP HOLD   LAVENDER TOP HOLD   GOLD TOP HOLD   PINK TOP HOLD          Imaging Results              X-Ray Chest AP Portable (Final result)  Result time 08/22/24 15:37:13      Final result by Yaron Ventura MD (08/22/24 15:37:13)                   Impression:      No acute chest disease is identified.    Mild cardiomegaly      Electronically signed by: Yaorn Ventura  Date:    08/22/2024  Time:    15:37               Narrative:    EXAMINATION:  XR CHEST AP PORTABLE    CLINICAL HISTORY:  , Cough, unspecified.    FINDINGS:  No alveolar consolidation, effusion, or pneumothorax is seen.   The thoracic aorta is normal  cardiac silhouette is enlarged, central pulmonary vessels and mediastinum are normal in size and are grossly unremarkable.   visualized osseous structures are grossly unremarkable.                                       Medications   sodium chloride 0.9% bolus 1,000 mL 1,000 mL (0 mLs Intravenous Stopped 8/22/24 1626)   butalbital-acetaminophen-caffeine -40 mg per tablet 1 tablet (1 tablet Oral Given 8/22/24 1509)   ondansetron injection 4 mg (4 mg Intravenous Given 8/22/24 1514)     Medical Decision Making  Differential: viral syndrome, dehydration, among others    ED management: HDS and afebrile. Lungs CTA bilaterally. Suspect pt dehydrated due to nausea/vomiting and poor hydration. Given IVF and zofran with improvement in symptoms. Stable for discharge home. Instructed to follow up with PCP in 3 days. ED return precautions given. She verbalized understanding. All questions answered.     Amount and/or Complexity of Data Reviewed  Labs: ordered.  Radiology: ordered. Decision-making details documented in ED Course.    Risk  Prescription drug management.               ED Course as of 08/22/24 1645   Thu Aug 22, 2024   1547 X-Ray Chest AP Portable  No acute chest disease is identified.     Mild cardiomegaly [KD]       ED Course User Index  [KD] Juliana Harvey PA-C                           Clinical Impression:  Final diagnoses:  [R05.9] Cough (Primary)  [R53.83] Fatigue, unspecified type  [R53.1] Generalized weakness          ED Disposition Condition    Discharge Stable          ED Prescriptions       Medication Sig Dispense Start Date End Date Auth. Provider    ondansetron (ZOFRAN-ODT) 4 MG TbDL Take 1 tablet (4 mg total) by mouth every 8 (eight) hours as needed (nausea/vomiting). 20 tablet 8/22/2024 -- Juliana Harvey PA-C          Follow-up Information       Follow up With Specialties Details Why Contact Info    [facility] Emergency Medicine  If symptoms worsen [address] [facility] [place] [id number] [phone]    Brandon Harrison MD Pulmonary Disease, Internal Medicine In 3 days Hospital follow up [address] [facility] [address]  [phone]               Juliana Harvey PA-C  08/22/24 8089

## 2024-09-03 RX ORDER — FUROSEMIDE 20 MG/1
20 TABLET ORAL
Qty: 30 TABLET | Refills: 0 | Status: SHIPPED | OUTPATIENT
Start: 2024-09-03

## 2024-09-16 ENCOUNTER — HOSPITAL ENCOUNTER (INPATIENT)
Facility: HOSPITAL | Age: 49
LOS: 4 days | Discharge: HOME | DRG: 872 | End: 2024-09-20
Attending: INTERNAL MEDICINE | Admitting: STUDENT IN AN ORGANIZED HEALTH CARE EDUCATION/TRAINING PROGRAM
Payer: MEDICAID

## 2024-09-16 DIAGNOSIS — R00.0 TACHYCARDIA: ICD-10-CM

## 2024-09-16 DIAGNOSIS — R79.89 ELEVATED TROPONIN: ICD-10-CM

## 2024-09-16 DIAGNOSIS — E87.20 LACTIC ACIDOSIS: Primary | ICD-10-CM

## 2024-09-16 DIAGNOSIS — N12 PYELONEPHRITIS: ICD-10-CM

## 2024-09-16 LAB
ALBUMIN SERPL-MCNC: 3.8 G/DL (ref 3.5–5)
ALBUMIN/GLOB SERPL: 1 RATIO (ref 1.1–2)
ALP SERPL-CCNC: 120 UNIT/L (ref 40–150)
ALT SERPL-CCNC: 103 UNIT/L (ref 0–55)
ANION GAP SERPL CALC-SCNC: 40 MEQ/L
AST SERPL-CCNC: 163 UNIT/L (ref 5–34)
BASOPHILS # BLD AUTO: 0.07 X10(3)/MCL
BASOPHILS NFR BLD AUTO: 0.4 %
BILIRUB SERPL-MCNC: 0.5 MG/DL
BUN SERPL-MCNC: 14.8 MG/DL (ref 9.8–20.1)
CALCIUM SERPL-MCNC: 9.2 MG/DL (ref 8.4–10.2)
CHLORIDE SERPL-SCNC: 98 MMOL/L (ref 98–107)
CO2 SERPL-SCNC: 6 MMOL/L (ref 22–29)
CREAT SERPL-MCNC: 1.66 MG/DL (ref 0.55–1.02)
CREAT/UREA NIT SERPL: 9
EOSINOPHIL # BLD AUTO: 0 X10(3)/MCL (ref 0–0.9)
EOSINOPHIL NFR BLD AUTO: 0 %
ERYTHROCYTE [DISTWIDTH] IN BLOOD BY AUTOMATED COUNT: 14.1 % (ref 11.5–17)
GFR SERPLBLD CREATININE-BSD FMLA CKD-EPI: 37 ML/MIN/1.73/M2
GLOBULIN SER-MCNC: 3.7 GM/DL (ref 2.4–3.5)
GLUCOSE SERPL-MCNC: 63 MG/DL (ref 74–100)
HCT VFR BLD AUTO: 41.5 % (ref 37–47)
HGB BLD-MCNC: 13.4 G/DL (ref 12–16)
IMM GRANULOCYTES # BLD AUTO: 0.13 X10(3)/MCL (ref 0–0.04)
IMM GRANULOCYTES NFR BLD AUTO: 0.7 %
LACTATE SERPL-SCNC: 21.1 MMOL/L (ref 0.5–2.2)
LYMPHOCYTES # BLD AUTO: 1.35 X10(3)/MCL (ref 0.6–4.6)
LYMPHOCYTES NFR BLD AUTO: 7.4 %
MCH RBC QN AUTO: 36.3 PG (ref 27–31)
MCHC RBC AUTO-ENTMCNC: 32.3 G/DL (ref 33–36)
MCV RBC AUTO: 112.5 FL (ref 80–94)
MONOCYTES # BLD AUTO: 0.73 X10(3)/MCL (ref 0.1–1.3)
MONOCYTES NFR BLD AUTO: 4 %
NEUTROPHILS # BLD AUTO: 15.88 X10(3)/MCL (ref 2.1–9.2)
NEUTROPHILS NFR BLD AUTO: 87.5 %
NRBC BLD AUTO-RTO: 0 %
PLATELET # BLD AUTO: 152 X10(3)/MCL (ref 130–400)
PMV BLD AUTO: 10.5 FL (ref 7.4–10.4)
POTASSIUM SERPL-SCNC: 5.3 MMOL/L (ref 3.5–5.1)
PROT SERPL-MCNC: 7.5 GM/DL (ref 6.4–8.3)
RBC # BLD AUTO: 3.69 X10(6)/MCL (ref 4.2–5.4)
SODIUM SERPL-SCNC: 144 MMOL/L (ref 136–145)
WBC # BLD AUTO: 18.16 X10(3)/MCL (ref 4.5–11.5)

## 2024-09-16 PROCEDURE — 87040 BLOOD CULTURE FOR BACTERIA: CPT | Performed by: INTERNAL MEDICINE

## 2024-09-16 PROCEDURE — 63600175 PHARM REV CODE 636 W HCPCS: Performed by: INTERNAL MEDICINE

## 2024-09-16 PROCEDURE — 80053 COMPREHEN METABOLIC PANEL: CPT | Performed by: INTERNAL MEDICINE

## 2024-09-16 PROCEDURE — 96372 THER/PROPH/DIAG INJ SC/IM: CPT | Performed by: INTERNAL MEDICINE

## 2024-09-16 PROCEDURE — 85025 COMPLETE CBC W/AUTO DIFF WBC: CPT | Performed by: INTERNAL MEDICINE

## 2024-09-16 PROCEDURE — 96361 HYDRATE IV INFUSION ADD-ON: CPT

## 2024-09-16 PROCEDURE — 25000003 PHARM REV CODE 250: Performed by: INTERNAL MEDICINE

## 2024-09-16 PROCEDURE — 84100 ASSAY OF PHOSPHORUS: CPT | Performed by: INTERNAL MEDICINE

## 2024-09-16 PROCEDURE — 93005 ELECTROCARDIOGRAM TRACING: CPT

## 2024-09-16 PROCEDURE — 96365 THER/PROPH/DIAG IV INF INIT: CPT

## 2024-09-16 PROCEDURE — 83605 ASSAY OF LACTIC ACID: CPT | Performed by: INTERNAL MEDICINE

## 2024-09-16 PROCEDURE — 83735 ASSAY OF MAGNESIUM: CPT | Performed by: INTERNAL MEDICINE

## 2024-09-16 PROCEDURE — 96375 TX/PRO/DX INJ NEW DRUG ADDON: CPT

## 2024-09-16 PROCEDURE — 99285 EMERGENCY DEPT VISIT HI MDM: CPT | Mod: 25

## 2024-09-16 PROCEDURE — 94761 N-INVAS EAR/PLS OXIMETRY MLT: CPT

## 2024-09-16 PROCEDURE — 21400001 HC TELEMETRY ROOM

## 2024-09-16 RX ORDER — DICYCLOMINE HYDROCHLORIDE 10 MG/ML
20 INJECTION INTRAMUSCULAR
Status: COMPLETED | OUTPATIENT
Start: 2024-09-16 | End: 2024-09-16

## 2024-09-16 RX ORDER — SODIUM CHLORIDE, SODIUM LACTATE, POTASSIUM CHLORIDE, CALCIUM CHLORIDE 600; 310; 30; 20 MG/100ML; MG/100ML; MG/100ML; MG/100ML
1000 INJECTION, SOLUTION INTRAVENOUS
Status: COMPLETED | OUTPATIENT
Start: 2024-09-17 | End: 2024-09-17

## 2024-09-16 RX ORDER — MORPHINE SULFATE 2 MG/ML
4 INJECTION, SOLUTION INTRAMUSCULAR; INTRAVENOUS
Status: COMPLETED | OUTPATIENT
Start: 2024-09-16 | End: 2024-09-16

## 2024-09-16 RX ORDER — ONDANSETRON HYDROCHLORIDE 2 MG/ML
4 INJECTION, SOLUTION INTRAVENOUS
Status: COMPLETED | OUTPATIENT
Start: 2024-09-16 | End: 2024-09-16

## 2024-09-16 RX ADMIN — SODIUM CHLORIDE, POTASSIUM CHLORIDE, SODIUM LACTATE AND CALCIUM CHLORIDE 1000 ML: 600; 310; 30; 20 INJECTION, SOLUTION INTRAVENOUS at 11:09

## 2024-09-16 RX ADMIN — MORPHINE SULFATE 4 MG: 2 INJECTION, SOLUTION INTRAMUSCULAR; INTRAVENOUS at 11:09

## 2024-09-16 RX ADMIN — PIPERACILLIN AND TAZOBACTAM 4.5 G: 4; .5 INJECTION, POWDER, LYOPHILIZED, FOR SOLUTION INTRAVENOUS; PARENTERAL at 11:09

## 2024-09-16 RX ADMIN — SODIUM CHLORIDE, POTASSIUM CHLORIDE, SODIUM LACTATE AND CALCIUM CHLORIDE 500 ML: 600; 310; 30; 20 INJECTION, SOLUTION INTRAVENOUS at 10:09

## 2024-09-16 RX ADMIN — ONDANSETRON 4 MG: 2 INJECTION INTRAMUSCULAR; INTRAVENOUS at 11:09

## 2024-09-16 RX ADMIN — DICYCLOMINE HYDROCHLORIDE 20 MG: 10 INJECTION, SOLUTION INTRAMUSCULAR at 10:09

## 2024-09-17 PROBLEM — N12 PYELONEPHRITIS: Status: ACTIVE | Noted: 2024-09-17

## 2024-09-17 PROBLEM — E87.20 LACTIC ACIDOSIS: Status: ACTIVE | Noted: 2024-09-17

## 2024-09-17 PROBLEM — A41.9 SEPSIS: Status: ACTIVE | Noted: 2024-09-17

## 2024-09-17 LAB
ALBUMIN SERPL-MCNC: 3.3 G/DL (ref 3.5–5)
ALBUMIN/GLOB SERPL: 1 RATIO (ref 1.1–2)
ALLENS TEST BLOOD GAS (OHS): YES
ALP SERPL-CCNC: 105 UNIT/L (ref 40–150)
ALT SERPL-CCNC: 91 UNIT/L (ref 0–55)
AMPHET UR QL SCN: NEGATIVE
ANION GAP SERPL CALC-SCNC: 29 MEQ/L
ANION GAP SERPL CALC-SCNC: ABNORMAL MMOL/L
APAP SERPL-MCNC: <3 UG/ML (ref 10–30)
APTT PPP: 37.2 SECONDS (ref 23.2–33.7)
AST SERPL-CCNC: 144 UNIT/L (ref 5–34)
BACTERIA #/AREA URNS AUTO: ABNORMAL /HPF
BARBITURATE SCN PRESENT UR: NEGATIVE
BASOPHILS # BLD AUTO: 0.03 X10(3)/MCL
BASOPHILS NFR BLD AUTO: 0.3 %
BENZODIAZ UR QL SCN: NEGATIVE
BILIRUB SERPL-MCNC: 0.7 MG/DL
BILIRUB UR QL STRIP.AUTO: NEGATIVE
BLOOD GAS SAMPLE TYPE (OHS): ABNORMAL
BUN SERPL-MCNC: 13.1 MG/DL (ref 9.8–20.1)
BUN SERPL-MCNC: 13.3 MG/DL (ref 9.8–20.1)
CALCIUM SERPL-MCNC: 8.4 MG/DL (ref 8.4–10.2)
CALCIUM SERPL-MCNC: 8.6 MG/DL (ref 8.4–10.2)
CANNABINOIDS UR QL SCN: POSITIVE
CHLORIDE SERPL-SCNC: 99 MMOL/L (ref 98–107)
CHLORIDE SERPL-SCNC: 99 MMOL/L (ref 98–107)
CLARITY UR: CLEAR
CO2 SERPL-SCNC: 11 MMOL/L (ref 22–29)
CO2 SERPL-SCNC: <5 MMOL/L (ref 22–29)
COCAINE UR QL SCN: NEGATIVE
COHGB MFR BLDA: 1.2 % (ref 0.5–1.5)
COLOR UR AUTO: ABNORMAL
CREAT SERPL-MCNC: 1.44 MG/DL (ref 0.55–1.02)
CREAT SERPL-MCNC: 1.58 MG/DL (ref 0.55–1.02)
CREAT/UREA NIT SERPL: 8
CREAT/UREA NIT SERPL: 9
CRP SERPL-MCNC: 3.7 MG/L
D DIMER PPP IA.FEU-MCNC: 3.53 UG/ML FEU (ref 0–0.5)
DRAWN BY BLOOD GAS (OHS): ABNORMAL
EOSINOPHIL # BLD AUTO: 0 X10(3)/MCL (ref 0–0.9)
EOSINOPHIL NFR BLD AUTO: 0 %
ERYTHROCYTE [DISTWIDTH] IN BLOOD BY AUTOMATED COUNT: 13.9 % (ref 11.5–17)
EST. AVERAGE GLUCOSE BLD GHB EST-MCNC: 85.3 MG/DL
ETHANOL SERPL-MCNC: <10 MG/DL
FENTANYL UR QL SCN: NEGATIVE
FOLATE SERPL-MCNC: <2.2 NG/ML (ref 7–31.4)
GFR SERPLBLD CREATININE-BSD FMLA CKD-EPI: 40 ML/MIN/1.73/M2
GFR SERPLBLD CREATININE-BSD FMLA CKD-EPI: 44 ML/MIN/1.73/M2
GLOBULIN SER-MCNC: 3.4 GM/DL (ref 2.4–3.5)
GLUCOSE SERPL-MCNC: 112 MG/DL (ref 70–110)
GLUCOSE SERPL-MCNC: 181 MG/DL (ref 74–100)
GLUCOSE SERPL-MCNC: 64 MG/DL (ref 74–100)
GLUCOSE UR QL STRIP: NORMAL
HBA1C MFR BLD: 4.6 %
HCT VFR BLD AUTO: 36.8 % (ref 37–47)
HGB BLD-MCNC: 12.8 G/DL (ref 12–16)
HGB UR QL STRIP: ABNORMAL
HOLD SPECIMEN: NORMAL
HYALINE CASTS #/AREA URNS LPF: ABNORMAL /LPF
IMM GRANULOCYTES # BLD AUTO: 0.05 X10(3)/MCL (ref 0–0.04)
IMM GRANULOCYTES NFR BLD AUTO: 0.4 %
INHALED O2 CONCENTRATION: 21 %
INR PPP: 1.7
KETONES UR QL STRIP: ABNORMAL
LACTATE SERPL-SCNC: 1.7 MMOL/L (ref 0.5–2.2)
LACTATE SERPL-SCNC: 11.9 MMOL/L (ref 0.5–2.2)
LACTATE SERPL-SCNC: 16 MMOL/L (ref 0.5–2.2)
LACTATE SERPL-SCNC: 19.3 MMOL/L (ref 0.5–2.2)
LACTATE SERPL-SCNC: 3.4 MMOL/L (ref 0.5–2.2)
LACTATE SERPL-SCNC: 6.1 MMOL/L (ref 0.5–2.2)
LEUKOCYTE ESTERASE UR QL STRIP: NEGATIVE
LYMPHOCYTES # BLD AUTO: 0.96 X10(3)/MCL (ref 0.6–4.6)
LYMPHOCYTES NFR BLD AUTO: 8.1 %
MAGNESIUM SERPL-MCNC: 1.5 MG/DL (ref 1.6–2.6)
MAGNESIUM SERPL-MCNC: 1.8 MG/DL (ref 1.6–2.6)
MCH RBC QN AUTO: 37.5 PG (ref 27–31)
MCHC RBC AUTO-ENTMCNC: 34.8 G/DL (ref 33–36)
MCV RBC AUTO: 107.9 FL (ref 80–94)
MDMA UR QL SCN: NEGATIVE
METHGB MFR BLDA: 0.9 % (ref 0–1.5)
MONOCYTES # BLD AUTO: 0.25 X10(3)/MCL (ref 0.1–1.3)
MONOCYTES NFR BLD AUTO: 2.1 %
MUCOUS THREADS URNS QL MICRO: ABNORMAL /LPF
NEUTROPHILS # BLD AUTO: 10.57 X10(3)/MCL (ref 2.1–9.2)
NEUTROPHILS NFR BLD AUTO: 89.1 %
NITRITE UR QL STRIP: NEGATIVE
NRBC BLD AUTO-RTO: 0 %
O2 HB BLOOD GAS (OHS): 97.7 % (ref 94–100)
OHS QRS DURATION: 70 MS
OHS QRS DURATION: 72 MS
OHS QRS DURATION: 72 MS
OHS QTC CALCULATION: 434 MS
OHS QTC CALCULATION: 465 MS
OHS QTC CALCULATION: 543 MS
OPIATES UR QL SCN: POSITIVE
OXYHGB MFR BLDA: 12.5 G/DL (ref 12–18)
PCO2 BLDA: 19 MMHG (ref 35–45)
PCP UR QL: NEGATIVE
PH BLDA: 7.26 [PH] (ref 7.35–7.45)
PH UR STRIP: 5.5 [PH]
PH UR: 5.5 [PH] (ref 3–11)
PHOSPHATE SERPL-MCNC: 7.2 MG/DL (ref 2.3–4.7)
PHOSPHATE SERPL-MCNC: 9 MG/DL (ref 2.3–4.7)
PLATELET # BLD AUTO: 102 X10(3)/MCL (ref 130–400)
PMV BLD AUTO: 11.3 FL (ref 7.4–10.4)
PO2 BLDA: 143 MMHG (ref 75–100)
POCT GLUCOSE: 112 MG/DL (ref 70–110)
POCT GLUCOSE: 82 MG/DL (ref 70–110)
POTASSIUM SERPL-SCNC: 4.6 MMOL/L (ref 3.5–5.1)
POTASSIUM SERPL-SCNC: 5.4 MMOL/L (ref 3.5–5.1)
PROT SERPL-MCNC: 6.7 GM/DL (ref 6.4–8.3)
PROT UR QL STRIP: ABNORMAL
PROTHROMBIN TIME: 19.8 SECONDS (ref 11.4–14)
RBC # BLD AUTO: 3.41 X10(6)/MCL (ref 4.2–5.4)
RBC #/AREA URNS AUTO: ABNORMAL /HPF
SALICYLATES SERPL-MCNC: <5 MG/DL (ref 15–30)
SAMPLE SITE BLOOD GAS (OHS): ABNORMAL
SAO2 % BLDA: 99.8 %
SODIUM SERPL-SCNC: 138 MMOL/L (ref 136–145)
SODIUM SERPL-SCNC: 139 MMOL/L (ref 136–145)
SP GR UR STRIP.AUTO: 1.03 (ref 1–1.03)
SPECIFIC GRAVITY, URINE AUTO (.000) (OHS): 1.03 (ref 1–1.03)
SQUAMOUS #/AREA URNS LPF: ABNORMAL /HPF
TROPONIN I SERPL-MCNC: 0.11 NG/ML (ref 0–0.04)
TROPONIN I SERPL-MCNC: 0.14 NG/ML (ref 0–0.04)
TROPONIN I SERPL-MCNC: 0.22 NG/ML (ref 0–0.04)
TROPONIN I SERPL-MCNC: 0.3 NG/ML (ref 0–0.04)
TSH SERPL-ACNC: 0.9 UIU/ML (ref 0.35–4.94)
UROBILINOGEN UR STRIP-ACNC: NORMAL
VIT B12 SERPL-MCNC: 1726 PG/ML (ref 213–816)
WBC # BLD AUTO: 11.86 X10(3)/MCL (ref 4.5–11.5)
WBC #/AREA URNS AUTO: ABNORMAL /HPF

## 2024-09-17 PROCEDURE — 63600175 PHARM REV CODE 636 W HCPCS

## 2024-09-17 PROCEDURE — 86140 C-REACTIVE PROTEIN: CPT

## 2024-09-17 PROCEDURE — 86036 ANCA SCREEN EACH ANTIBODY: CPT

## 2024-09-17 PROCEDURE — 85610 PROTHROMBIN TIME: CPT

## 2024-09-17 PROCEDURE — 93005 ELECTROCARDIOGRAM TRACING: CPT

## 2024-09-17 PROCEDURE — 25000003 PHARM REV CODE 250: Performed by: INTERNAL MEDICINE

## 2024-09-17 PROCEDURE — 25000003 PHARM REV CODE 250

## 2024-09-17 PROCEDURE — 82300 ASSAY OF CADMIUM: CPT

## 2024-09-17 PROCEDURE — 84443 ASSAY THYROID STIM HORMONE: CPT

## 2024-09-17 PROCEDURE — 85025 COMPLETE CBC W/AUTO DIFF WBC: CPT

## 2024-09-17 PROCEDURE — 25000003 PHARM REV CODE 250: Performed by: STUDENT IN AN ORGANIZED HEALTH CARE EDUCATION/TRAINING PROGRAM

## 2024-09-17 PROCEDURE — 80179 DRUG ASSAY SALICYLATE: CPT

## 2024-09-17 PROCEDURE — 96367 TX/PROPH/DG ADDL SEQ IV INF: CPT

## 2024-09-17 PROCEDURE — 86039 ANTINUCLEAR ANTIBODIES (ANA): CPT

## 2024-09-17 PROCEDURE — 83605 ASSAY OF LACTIC ACID: CPT | Performed by: INTERNAL MEDICINE

## 2024-09-17 PROCEDURE — 84100 ASSAY OF PHOSPHORUS: CPT

## 2024-09-17 PROCEDURE — 85730 THROMBOPLASTIN TIME PARTIAL: CPT

## 2024-09-17 PROCEDURE — 84484 ASSAY OF TROPONIN QUANT: CPT

## 2024-09-17 PROCEDURE — 82607 VITAMIN B-12: CPT

## 2024-09-17 PROCEDURE — 63600175 PHARM REV CODE 636 W HCPCS: Performed by: INTERNAL MEDICINE

## 2024-09-17 PROCEDURE — 82803 BLOOD GASES ANY COMBINATION: CPT

## 2024-09-17 PROCEDURE — 25500020 PHARM REV CODE 255: Performed by: INTERNAL MEDICINE

## 2024-09-17 PROCEDURE — 81015 MICROSCOPIC EXAM OF URINE: CPT | Performed by: STUDENT IN AN ORGANIZED HEALTH CARE EDUCATION/TRAINING PROGRAM

## 2024-09-17 PROCEDURE — 80320 DRUG SCREEN QUANTALCOHOLS: CPT

## 2024-09-17 PROCEDURE — 82746 ASSAY OF FOLIC ACID SERUM: CPT

## 2024-09-17 PROCEDURE — 21400001 HC TELEMETRY ROOM

## 2024-09-17 PROCEDURE — 80307 DRUG TEST PRSMV CHEM ANLYZR: CPT | Performed by: INTERNAL MEDICINE

## 2024-09-17 PROCEDURE — 86780 TREPONEMA PALLIDUM: CPT

## 2024-09-17 PROCEDURE — 99900035 HC TECH TIME PER 15 MIN (STAT)

## 2024-09-17 PROCEDURE — 83605 ASSAY OF LACTIC ACID: CPT

## 2024-09-17 PROCEDURE — 85379 FIBRIN DEGRADATION QUANT: CPT

## 2024-09-17 PROCEDURE — 36415 COLL VENOUS BLD VENIPUNCTURE: CPT

## 2024-09-17 PROCEDURE — 80053 COMPREHEN METABOLIC PANEL: CPT

## 2024-09-17 PROCEDURE — 36600 WITHDRAWAL OF ARTERIAL BLOOD: CPT

## 2024-09-17 PROCEDURE — 94761 N-INVAS EAR/PLS OXIMETRY MLT: CPT | Mod: XB

## 2024-09-17 PROCEDURE — 27000221 HC OXYGEN, UP TO 24 HOURS

## 2024-09-17 PROCEDURE — 83036 HEMOGLOBIN GLYCOSYLATED A1C: CPT

## 2024-09-17 PROCEDURE — 82077 ASSAY SPEC XCP UR&BREATH IA: CPT

## 2024-09-17 PROCEDURE — 80143 DRUG ASSAY ACETAMINOPHEN: CPT

## 2024-09-17 PROCEDURE — 81001 URINALYSIS AUTO W/SCOPE: CPT | Performed by: STUDENT IN AN ORGANIZED HEALTH CARE EDUCATION/TRAINING PROGRAM

## 2024-09-17 PROCEDURE — 83735 ASSAY OF MAGNESIUM: CPT

## 2024-09-17 RX ORDER — TALC
6 POWDER (GRAM) TOPICAL NIGHTLY PRN
Status: DISCONTINUED | OUTPATIENT
Start: 2024-09-17 | End: 2024-09-20 | Stop reason: HOSPADM

## 2024-09-17 RX ORDER — PNV NO.95/FERROUS FUM/FOLIC AC 28MG-0.8MG
100 TABLET ORAL DAILY
Status: DISCONTINUED | OUTPATIENT
Start: 2024-09-17 | End: 2024-09-17

## 2024-09-17 RX ORDER — SODIUM CHLORIDE 0.9 % (FLUSH) 0.9 %
10 SYRINGE (ML) INJECTION
Status: DISCONTINUED | OUTPATIENT
Start: 2024-09-17 | End: 2024-09-20 | Stop reason: HOSPADM

## 2024-09-17 RX ORDER — PANTOPRAZOLE SODIUM 40 MG/10ML
40 INJECTION, POWDER, LYOPHILIZED, FOR SOLUTION INTRAVENOUS DAILY
Status: DISCONTINUED | OUTPATIENT
Start: 2024-09-17 | End: 2024-09-20 | Stop reason: HOSPADM

## 2024-09-17 RX ORDER — ONDANSETRON HYDROCHLORIDE 2 MG/ML
4 INJECTION, SOLUTION INTRAVENOUS EVERY 8 HOURS PRN
Status: DISCONTINUED | OUTPATIENT
Start: 2024-09-17 | End: 2024-09-17

## 2024-09-17 RX ORDER — HYDRALAZINE HYDROCHLORIDE 20 MG/ML
10 INJECTION INTRAMUSCULAR; INTRAVENOUS EVERY 6 HOURS PRN
Status: DISCONTINUED | OUTPATIENT
Start: 2024-09-17 | End: 2024-09-20 | Stop reason: HOSPADM

## 2024-09-17 RX ORDER — METOCLOPRAMIDE HYDROCHLORIDE 5 MG/ML
10 INJECTION INTRAMUSCULAR; INTRAVENOUS EVERY 6 HOURS PRN
Status: DISCONTINUED | OUTPATIENT
Start: 2024-09-17 | End: 2024-09-17

## 2024-09-17 RX ORDER — SODIUM CHLORIDE, SODIUM LACTATE, POTASSIUM CHLORIDE, CALCIUM CHLORIDE 600; 310; 30; 20 MG/100ML; MG/100ML; MG/100ML; MG/100ML
INJECTION, SOLUTION INTRAVENOUS CONTINUOUS
Status: DISCONTINUED | OUTPATIENT
Start: 2024-09-17 | End: 2024-09-17

## 2024-09-17 RX ORDER — MAGNESIUM SULFATE HEPTAHYDRATE 40 MG/ML
2 INJECTION, SOLUTION INTRAVENOUS ONCE
Status: COMPLETED | OUTPATIENT
Start: 2024-09-17 | End: 2024-09-17

## 2024-09-17 RX ORDER — FOLIC ACID 1 MG/1
1 TABLET ORAL DAILY
Status: DISCONTINUED | OUTPATIENT
Start: 2024-09-17 | End: 2024-09-20 | Stop reason: HOSPADM

## 2024-09-17 RX ORDER — METOPROLOL TARTRATE 50 MG/1
50 TABLET ORAL 2 TIMES DAILY
Status: DISCONTINUED | OUTPATIENT
Start: 2024-09-17 | End: 2024-09-17

## 2024-09-17 RX ORDER — DEXTROSE, SODIUM CHLORIDE, SODIUM LACTATE, POTASSIUM CHLORIDE, AND CALCIUM CHLORIDE 5; .6; .31; .03; .02 G/100ML; G/100ML; G/100ML; G/100ML; G/100ML
INJECTION, SOLUTION INTRAVENOUS CONTINUOUS
Status: DISCONTINUED | OUTPATIENT
Start: 2024-09-17 | End: 2024-09-17

## 2024-09-17 RX ORDER — HYDROMORPHONE HYDROCHLORIDE 1 MG/ML
0.5 INJECTION, SOLUTION INTRAMUSCULAR; INTRAVENOUS; SUBCUTANEOUS ONCE
Status: DISCONTINUED | OUTPATIENT
Start: 2024-09-17 | End: 2024-09-20 | Stop reason: HOSPADM

## 2024-09-17 RX ORDER — HYDRALAZINE HYDROCHLORIDE 25 MG/1
25 TABLET, FILM COATED ORAL EVERY 8 HOURS
Status: DISCONTINUED | OUTPATIENT
Start: 2024-09-17 | End: 2024-09-17

## 2024-09-17 RX ORDER — PROCHLORPERAZINE EDISYLATE 5 MG/ML
2.5 INJECTION INTRAMUSCULAR; INTRAVENOUS EVERY 6 HOURS PRN
Status: DISCONTINUED | OUTPATIENT
Start: 2024-09-17 | End: 2024-09-20 | Stop reason: HOSPADM

## 2024-09-17 RX ORDER — MUPIROCIN 20 MG/G
OINTMENT TOPICAL 2 TIMES DAILY
Status: DISCONTINUED | OUTPATIENT
Start: 2024-09-17 | End: 2024-09-20 | Stop reason: HOSPADM

## 2024-09-17 RX ORDER — PANTOPRAZOLE SODIUM 40 MG/1
40 TABLET, DELAYED RELEASE ORAL DAILY
Status: DISCONTINUED | OUTPATIENT
Start: 2024-09-17 | End: 2024-09-17

## 2024-09-17 RX ORDER — METOCLOPRAMIDE HYDROCHLORIDE 5 MG/ML
10 INJECTION INTRAMUSCULAR; INTRAVENOUS EVERY 6 HOURS
Status: DISCONTINUED | OUTPATIENT
Start: 2024-09-17 | End: 2024-09-18

## 2024-09-17 RX ORDER — BUPROPION HYDROCHLORIDE 150 MG/1
300 TABLET ORAL DAILY
Status: DISCONTINUED | OUTPATIENT
Start: 2024-09-17 | End: 2024-09-17

## 2024-09-17 RX ORDER — SODIUM CHLORIDE, SODIUM LACTATE, POTASSIUM CHLORIDE, CALCIUM CHLORIDE 600; 310; 30; 20 MG/100ML; MG/100ML; MG/100ML; MG/100ML
INJECTION, SOLUTION INTRAVENOUS CONTINUOUS
Status: DISCONTINUED | OUTPATIENT
Start: 2024-09-17 | End: 2024-09-20

## 2024-09-17 RX ORDER — INDOMETHACIN 25 MG/1
150 CAPSULE ORAL ONCE
Status: COMPLETED | OUTPATIENT
Start: 2024-09-17 | End: 2024-09-17

## 2024-09-17 RX ADMIN — PIPERACILLIN SODIUM AND TAZOBACTAM SODIUM 4.5 G: 4; .5 INJECTION, POWDER, LYOPHILIZED, FOR SOLUTION INTRAVENOUS at 11:09

## 2024-09-17 RX ADMIN — SODIUM CHLORIDE, POTASSIUM CHLORIDE, SODIUM LACTATE AND CALCIUM CHLORIDE: 600; 310; 30; 20 INJECTION, SOLUTION INTRAVENOUS at 08:09

## 2024-09-17 RX ADMIN — SODIUM CHLORIDE, POTASSIUM CHLORIDE, SODIUM LACTATE AND CALCIUM CHLORIDE 1000 ML: 600; 310; 30; 20 INJECTION, SOLUTION INTRAVENOUS at 01:09

## 2024-09-17 RX ADMIN — RIVAROXABAN 15 MG: 15 TABLET, FILM COATED ORAL at 05:09

## 2024-09-17 RX ADMIN — MUPIROCIN: 20 OINTMENT TOPICAL at 08:09

## 2024-09-17 RX ADMIN — PROMETHAZINE HYDROCHLORIDE 25 MG: 25 INJECTION INTRAMUSCULAR; INTRAVENOUS at 02:09

## 2024-09-17 RX ADMIN — METOCLOPRAMIDE 10 MG: 5 INJECTION, SOLUTION INTRAMUSCULAR; INTRAVENOUS at 05:09

## 2024-09-17 RX ADMIN — PIPERACILLIN SODIUM AND TAZOBACTAM SODIUM 4.5 G: 4; .5 INJECTION, POWDER, LYOPHILIZED, FOR SOLUTION INTRAVENOUS at 02:09

## 2024-09-17 RX ADMIN — IOHEXOL 100 ML: 350 INJECTION, SOLUTION INTRAVENOUS at 12:09

## 2024-09-17 RX ADMIN — METOCLOPRAMIDE 10 MG: 5 INJECTION, SOLUTION INTRAMUSCULAR; INTRAVENOUS at 11:09

## 2024-09-17 RX ADMIN — PROCHLORPERAZINE EDISYLATE 2.5 MG: 5 INJECTION INTRAMUSCULAR; INTRAVENOUS at 08:09

## 2024-09-17 RX ADMIN — MAGNESIUM SULFATE HEPTAHYDRATE 2 G: 40 INJECTION, SOLUTION INTRAVENOUS at 03:09

## 2024-09-17 RX ADMIN — PANTOPRAZOLE SODIUM 40 MG: 40 INJECTION, POWDER, FOR SOLUTION INTRAVENOUS at 08:09

## 2024-09-17 RX ADMIN — DEXTROSE MONOHYDRATE 1250 MG: 50 INJECTION, SOLUTION INTRAVENOUS at 05:09

## 2024-09-17 RX ADMIN — SODIUM CHLORIDE, SODIUM LACTATE, POTASSIUM CHLORIDE, CALCIUM CHLORIDE AND DEXTROSE MONOHYDRATE: 5; 600; 310; 30; 20 INJECTION, SOLUTION INTRAVENOUS at 03:09

## 2024-09-17 RX ADMIN — ONDANSETRON 4 MG: 2 INJECTION INTRAMUSCULAR; INTRAVENOUS at 03:09

## 2024-09-17 RX ADMIN — PROCHLORPERAZINE EDISYLATE 2.5 MG: 5 INJECTION INTRAMUSCULAR; INTRAVENOUS at 06:09

## 2024-09-17 RX ADMIN — SODIUM BICARBONATE 150 MEQ: 84 INJECTION, SOLUTION INTRAVENOUS at 03:09

## 2024-09-17 RX ADMIN — PIPERACILLIN SODIUM AND TAZOBACTAM SODIUM 4.5 G: 4; .5 INJECTION, POWDER, LYOPHILIZED, FOR SOLUTION INTRAVENOUS at 07:09

## 2024-09-17 RX ADMIN — VANCOMYCIN HYDROCHLORIDE 1000 MG: 1 INJECTION, POWDER, LYOPHILIZED, FOR SOLUTION INTRAVENOUS at 06:09

## 2024-09-17 NOTE — ED PROVIDER NOTES
Encounter Date: 9/16/2024       History     Chief Complaint   Patient presents with    Abdominal Pain     Began vomiting last night around 0000, worsened this evening and also began having lower abdominal pain that she states has since migrated upwards towards chest. 4mg zofran per EMS. Hx gastroparesis    Nausea    Vomiting     Presents with nausea and vomiting, states Hx of gastroparesis. States abdominal pain, more among bilateral lower abdomen, denies fever, dysuria, hematuria or vaginal bleeding. Denies abdominal surgery, Hx of HTN, PVD    The history is provided by the patient.     Review of patient's allergies indicates:  No Known Allergies  Past Medical History:   Diagnosis Date    Gastroparesis     Hypertension      Past Surgical History:   Procedure Laterality Date    INSERTION OF INFERIOR VENA CAVAL FILTER N/A 9/16/2022    Procedure: Insertion, Filter, Inferior Vena Cava;  Surgeon: Juancarlos Victoria MD;  Location: [facility] CATH LAB;  Service: Cardiology;  Laterality: N/A;    IVC FILTER RETRIEVAL N/A 2/7/2024    Procedure: REMOVAL, FILTER, INFERIOR VENA CAVA;  Surgeon: Juancarlos Victoria MD;  Location: [facility] CATH LAB;  Service: Cardiology;  Laterality: N/A;  IVC FILTER REMOVAL    NEPHRECTOMY      PERCUTANEOUS MECHANICAL THROMBECTOMY OF VASCULAR GRAFT OF UPPER EXTREMITY  9/14/2022    Procedure: THROMBECTOMY, MECHANICAL, VASCULAR GRAFT, UPPER EXTREMITY, PERCUTANEOUS;  Surgeon: Juancarlos Victoria MD;  Location: [facility] CATH LAB;  Service: Cardiology;;    PHLEBOGRAPHY  9/16/2022    Procedure: VENOGRAM;  Surgeon: Juancarlos Victoria MD;  Location: [facility] CATH LAB;  Service: Cardiology;;    THROMBECTOMY  9/14/2022    Procedure: THROMBECTOMY;  Surgeon: Juancarlos Victoria MD;  Location: [facility] CATH LAB;  Service: Cardiology;;     No family history on file.  Social History     Tobacco Use    Smoking status: Some Days     Types: Cigarettes   Substance Use Topics    Alcohol use: Yes     Comment: RARELY    Drug use: Never     Review of Systems    Gastrointestinal:  Positive for abdominal pain, nausea and vomiting.   All other systems reviewed and are negative.      Physical Exam     Initial Vitals [09/16/24 2221]   BP Pulse Resp Temp SpO2   136/88 (!) 134 18 97.8 °F (36.6 °C) 98 %      MAP       --         Physical Exam    Nursing note and vitals reviewed.  Constitutional: She appears well-developed and well-nourished. She appears distressed.   HENT:   Head: Normocephalic and atraumatic.   Dry oral mucosa   Eyes: Conjunctivae and EOM are normal. Pupils are equal, round, and reactive to light.   Neck: Neck supple. No JVD present.   Normal range of motion.  Cardiovascular:  Regular rhythm and normal heart sounds.           Tachycardic  Dorsal pedis +1   Pulmonary/Chest: Breath sounds normal.   Abdominal: Abdomen is soft. Bowel sounds are normal. She exhibits no distension. There is abdominal tenderness (RLQ, some diffuse tenderness). There is guarding (RLQ). There is no rebound.   Musculoskeletal:         General: No edema. Normal range of motion.      Cervical back: Normal range of motion and neck supple.     Neurological: She is alert and oriented to person, place, and time. She has normal strength. GCS score is 15. GCS eye subscore is 4. GCS verbal subscore is 5. GCS motor subscore is 6.   Skin: Skin is warm and dry. Rash noted.   mottled   Psychiatric: Thought content normal.   anxious         ED Course   Critical Care    Date/Time: 9/17/2024 1:36 AM    Performed by: Moses Mathis MD  Authorized by: Moses Mathis MD  Direct patient critical care time: 15 minutes  Additional history critical care time: 5 minutes  Ordering / reviewing critical care time: 15 minutes  Documentation critical care time: 5 minutes  Consulting other physicians critical care time: 5 minutes  Total critical care time (exclusive of procedural time) : 45 minutes  Critical care was necessary to treat or prevent imminent or life-threatening  deterioration of the following conditions: sepsis, metabolic crisis and dehydration.  Critical care was time spent personally by me on the following activities: discussions with consultants, development of treatment plan with patient or surrogate, interpretation of cardiac output measurements, evaluation of patient's response to treatment, examination of patient, obtaining history from patient or surrogate, ordering and performing treatments and interventions, ordering and review of laboratory studies, ordering and review of radiographic studies, pulse oximetry, re-evaluation of patient's condition and review of old charts.        Labs Reviewed   COMPREHENSIVE METABOLIC PANEL - Abnormal       Result Value    Sodium 144      Potassium 5.3 (*)     Chloride 98      CO2 6 (*)     Glucose 63 (*)     Blood Urea Nitrogen 14.8      Creatinine 1.66 (*)     Calcium 9.2      Protein Total 7.5      Albumin 3.8      Globulin 3.7 (*)     Albumin/Globulin Ratio 1.0 (*)     Bilirubin Total 0.5             (*)      (*)     eGFR 37      Anion Gap 40.0      BUN/Creatinine Ratio 9     CBC WITH DIFFERENTIAL - Abnormal    WBC 18.16 (*)     RBC 3.69 (*)     Hgb 13.4      Hct 41.5      .5 (*)     MCH 36.3 (*)     MCHC 32.3 (*)     RDW 14.1      Platelet 152      MPV 10.5 (*)     Neut % 87.5      Lymph % 7.4      Mono % 4.0      Eos % 0.0      Basophil % 0.4      Lymph # 1.35      Neut # 15.88 (*)     Mono # 0.73      Eos # 0.00      Baso # 0.07      IG# 0.13 (*)     IG% 0.7      NRBC% 0.0     LACTIC ACID, PLASMA - Abnormal    Lactic Acid Level 21.1 (*)    LACTIC ACID, PLASMA - Abnormal    Lactic Acid Level 19.3 (*)    PHOSPHORUS - Abnormal    Phosphorus Level 9.0 (*)    MAGNESIUM - Normal    Magnesium Level 1.80     BLOOD CULTURE [facility]   BLOOD CULTURE [facility]   CBC W/ AUTO DIFFERENTIAL    Narrative:     The following orders were created for panel order CBC auto differential.  Procedure                                Abnormality         Status                     ---------                               -----------         ------                     CBC with Differential[[id number]]       Abnormal            Final result                 Please view results for these tests on the individual orders.   URINALYSIS, REFLEX TO URINE CULTURE   DRUG SCREEN, URINE (BEAKER)     EKG Readings: (Independently Interpreted)   Initial Reading: No STEMI. Rhythm: Sinus Tachycardia. Heart Rate: 134. Ectopy: No Ectopy. Conduction: Normal. ST Segments: Normal ST Segments. T Waves: Normal. Axis: Normal. Clinical Impression: Sinus Tachycardia       Imaging Results              CT Chest Abdomen Pelvis With IV Contrast (XPD) Routine Oral Contrast (Preliminary result)  Result time 09/17/24 01:14:17      Preliminary result by Bakari Solorio MD (09/17/24 01:14:17)                   Narrative:    START OF REPORT:  Technique: CT Scan of the chest abdomen and pelvis was performed with intravenous contrast with axial as well as sagittal and, coronal images.    Dosage Information: Automated Exposure Control was utilized.    Comparison: Comparison is with study dated 2024-09-16 23:04:03.    Clinical History: Abdominal Pain(Began vomiting last night around 0000, worsened this evening and also began having lower abdominal pain that she states has since migrated upwards towards chest. 4mg zofran per EMS. Hx gastroparesis) Nausea Vomiting.    Findings:  Soft Tissues: Unremarkable.  Lines and Tubes: None.  Neck: The visualized soft tissues of the neck appear unremarkable.  Mediastinum: There is diffuse wall thickening of the collapsed esophagus with associated mucosal thickening consistent with esophagitis.  Heart: Mild cardiomegaly is seen.  Aorta: Unremarkable appearing aorta. No aortic dissection or aneurysm is seen.  Pulmonary Arteries: No filling defects are seen in the pulmonary arteries to suggest pulmonary embolus to the sensitivity of the study.  Lungs:  Mild streaky linear opacity is seen consistent with scarring subsegmental atelectasis. No focal infiltrate or consolidation identified.  Pleura: No effusions or pneumothorax are identified.  Bony Structures:  Spine: Mild spondylolytic changes are seen in the thoracic spine.  Ribs: The ribs appear unremarkable.  Abdomen:  Abdominal Wall: No abdominal wall pathology is seen.  Liver: Pronounced fatty infiltration of the liver is present. There is a 1.8 cm focal area of fat sparing in the lateral right hepatic lobe on series 2 image 93 also seen in the non contrast prior study. There is better delineation of a subcentimeter cyst in the inferolateral right hepatic lobe.  Biliary System: No intrahepatic or extrahepatic biliary duct dilatation is seen.  Gallbladder: The gallbladder appears unremarkable.  Pancreas: The pancreas appears unremarkable.  Spleen: The spleen appears unremarkable.  Adrenals: The adrenal glands appear unremarkable.  Kidneys: The right kidney appears unremarkable with no stones cysts masses or hydronephrosis. Nonspecific perinephric fat stranding is noted on the right. Surgical clips are seen in the left renal bed which may reflect prior nephrectomy, correlate with surgical history.  Aorta: The abdominal aorta appears unremarkable.  IVC: Unremarkable.  Bowel:  Stomach: There is diffuse gastric wall thickening with associated mucosal wall thickening which may represent an element of gastritis.  Duodenum: Unremarkable appearing duodenum.  Small Bowel: The small bowel appears unremarkable.  Colon: There is moderate stool in the descending and sigmoid colon which could reflect an element of constipation. A few diverticula are seen in the transverse colon. No associated inflammatory stranding is seen to suggest diverticulitis.  Appendix: The appendix is not identified but no inflammatory changes are seen in the right lower quadrant to suggest appendicitis.  Peritoneum: No intraperitoneal free air or  ascites is seen.    Pelvis:  Bladder: The bladder appears unremarkable.  Female:  Uterus: There is a 2.6 cm peripherally enhancing ovoid hypodensity along anterior myometrium centered on series 2 image 173.  Ovaries: No adnexal masses are seen.    Bony structures:  Dorsal Spine: There is mild spondylosis of the visualized dorsal spine.  Bony Pelvis: The visualized bony structures of the pelvis appear unremarkable.      Impression:  1. There is diffuse wall thickening of the collapsed esophagus with associated mucosal thickening consistent with esophagitis.  2. There is diffuse gastric wall thickening with associated mucosal wall thickening which may represent an element of gastritis.  3. There is a 2.6 cm peripherally enhancing ovoid hypodensity along anterior myometrium centered on series 2 image 173. Correlate clinically as regards additional evaluation and follow-up.  4. Details and other findings as discussed above.                                         CT Abdomen Pelvis  Without Contrast (Preliminary result)  Result time 09/16/24 23:39:42      Preliminary result by Bakari Solorio MD (09/16/24 23:39:42)                   Narrative:    START OF REPORT:  Technique: CT of the abdomen and pelvis was performed with axial images as well as sagittal and coronal reconstruction images without intravenous contrast.    Comparison: None available.    Clinical History: Abdominal Pain(Began vomiting last night around 0000, worsened this evening and also began having lower abdominal pain that she states has since migrated upwards towards chest. 4mg zofran per EMS. Hx gastroparesis) Nausea Vomiting.    Dosage Information: Automated Exposure Control was utilized.    Findings:  Lines and Tubes: None.  Thorax:  Lungs: The visualized lung bases appear unremarkable.  Pleura: No effusions or thickening are seen. No pneumothorax is seen in the visualized lung bases.  Heart: The heart size is within normal  limits.  Abdomen:  Abdominal Wall: No abdominal wall pathology is seen.  Liver: Pronounced fatty infiltration of the liver is present. The liver otherwise appears unremarkable.  Biliary System: No intrahepatic or extrahepatic biliary duct dilatation is seen.  Gallbladder: Moderate hyperdensity is seen in the gallbladder which may represent sludge. The gallbladder otherwise appears unremarkable.  Pancreas: The pancreas appears unremarkable.  Spleen: The spleen appears unremarkable.  Adrenals: The adrenal glands appear unremarkable.  Kidneys: Post-nephrectomy changes are seen in the left. There is mildly prominent perinephric stranding on the right. The right kidney otherwise appears unremarkable with no stones cysts masses or hydronephrosis.  Aorta: There is minimal calcification of the abdominal aorta and its branches.  Bowel:  Esophagus: There appears to be mild thickening versus underdistention of the distal esophagus.  Stomach: The stomach appears unremarkable.  Duodenum: Unremarkable appearing duodenum.  Small Bowel: The small bowel appears unremarkable.  Colon: Nondistended. There is moderate stool in the colon which could reflect an element of constipation.  Appendix: The appendix appears unremarkable and is seen on Image 81, Series 2 through Image 83, Series 2.  Peritoneum: No intraperitoneal free air or ascites is seen.    Pelvis:  Bladder: The bladder appears unremarkable.  Female:  Uterus: The uterus appears prominent with a lobulated contour. This is suggestive of uterine myomas.  Ovaries: No adnexal masses are seen.    Bony structures:  Dorsal Spine: There is mild multilevel spondylosis of the visualized dorsal spine.  Bony Pelvis: The visualized bony structures of the pelvis appear unremarkable.      Impression:  1. There appears to be mild thickening versus underdistention of the distal esophagus. Correlate clinically as regards possible reflux esophagitis.  2. There is mildly prominent perinephric  stranding on the right. The right kidney otherwise appears unremarkable with no stones cysts masses or hydronephrosis. Correlate with clinical and laboratory findings as regards possible right-sided pyelonephritis.  3. No definite acute intraabdominal or pelvic solid organ or bowel pathology identified on this noncontrast scan. Details and other findings as discussed above.                                         Medications   promethazine (PHENERGAN) 25 mg in 0.9% NaCl 50 mL IVPB (has no administration in time range)   vancomycin (VANCOCIN) 1,000 mg in D5W 250 mL IVPB (admixture device) (has no administration in time range)   lactated ringers bolus 500 mL (0 mLs Intravenous Stopped 9/16/24 2339)   dicyclomine injection 20 mg (20 mg Intramuscular Given 9/16/24 2239)   lactated ringers bolus 1,000 mL (0 mLs Intravenous Stopped 9/17/24 0030)   piperacillin-tazobactam (ZOSYN) 4.5 g in D5W 100 mL IVPB (MB+) (0 g Intravenous Stopped 9/17/24 0001)   lactated ringers bolus 1,000 mL (0 mLs Intravenous Stopped 9/17/24 0030)   morphine injection 4 mg (4 mg Intravenous Given 9/16/24 2345)   ondansetron injection 4 mg (4 mg Intravenous Given 9/16/24 2345)   lactated ringers infusion (1,000 mLs Intravenous New Bag 9/17/24 0110)   iohexoL (OMNIPAQUE 350) injection 100 mL (100 mLs Intravenous Given 9/17/24 0018)     Medical Decision Making  Amount and/or Complexity of Data Reviewed  Labs: ordered. Decision-making details documented in ED Course.  Radiology: ordered and independent interpretation performed. Decision-making details documented in ED Course.  ECG/medicine tests: ordered and independent interpretation performed. Decision-making details documented in ED Course.  Discussion of management or test interpretation with external provider(s): 1:37 AM Consult: I discussed the case with Dr. DURAN Felipe ([facility]). Agrees with current management.   Recommends will evaluate in ED      Risk  Prescription drug management.  Decision  regarding hospitalization.      Additional MDM:   Differential Diagnosis:   Appendicitis, Diverticulitis, Pancreatitis, Pyelonephritis, AAA, Dissection, MI, Gastric Ulcer, Peptic Ulcer, Urinary retention, among others                                        Clinical Impression:  Final diagnoses:  [E87.20] Lactic acidosis (Primary)  [N12] Pyelonephritis          ED Disposition Condition    Admit Serious                Moses Mathis MD  09/17/24 0137

## 2024-09-17 NOTE — PROGRESS NOTES
"Pharmacokinetic Initial Assessment: IV Vancomycin    Assessment/Plan:    Initiate intravenous vancomycin with loading dose of 1250 mg once followed by a maintenance dose of vancomycin 1000 mg IV every 12 hours  Desired empiric serum trough concentration is 15 to 20 mcg/mL  Draw vancomycin trough level 60 min prior to fourth dose on 9/18 at approximately 1630.  Pharmacy will continue to follow and monitor vancomycin.      Please contact pharmacy at extension [phone] with any questions regarding this assessment.     Thank you for the consult,   Ruba Patel PharmD       Patient brief summary:  Nahed Harvey is a 48 y.o. female initiated on antimicrobial therapy with IV Vancomycin for treatment of suspected sepsis    Drug Allergies:   Review of patient's allergies indicates:  No Known Allergies    Actual Body Weight:   74.8 kg    Renal Function:   Estimated Creatinine Clearance: 42.2 mL/min (A) (based on SCr of 1.58 mg/dL (H)).,     Dialysis Method (if applicable):  N/A    CBC (last 72 hours):  Recent Labs   Lab Result Units 09/16/24 2234 09/17/24  0405   WBC x10(3)/mcL 18.16* 11.86*   Hgb g/dL 13.4 12.8   Hemoglobin A1c %  --  4.6   Hct % 41.5 36.8*   Platelet x10(3)/mcL 152 102*   Mono % % 4.0 2.1   Eos % % 0.0 0.0   Basophil % % 0.4 0.3       Metabolic Panel (last 72 hours):  Recent Labs   Lab Result Units 09/16/24 2234 09/17/24  0200 09/17/24  0715   Sodium mmol/L 144 138 139   Potassium mmol/L 5.3* 5.4* 4.6   Chloride mmol/L 98 99 99   CO2 mmol/L 6* <5* 11*   Glucose mg/dL 63* 64* 181*   Blood Urea Nitrogen mg/dL 14.8 13.3 13.1   Creatinine mg/dL 1.66* 1.44* 1.58*   Albumin g/dL 3.8 3.3*  --    Bilirubin Total mg/dL 0.5 0.7  --    ALP unit/L 120 105  --    AST unit/L 163* 144*  --    ALT unit/L 103* 91*  --    Magnesium Level mg/dL 1.80 1.50*  --    Phosphorus Level mg/dL 9.0* 7.2*  --        Drug levels (last 3 results):  No results for input(s): "VANCOMYCINRA", "VANCORANDOM", "VANCOMYCINPE", "VANCOPEAK", " ""VANCOMYCINTR", "VANCOTROUGH" in the last 72 hours.    Microbiologic Results:  Microbiology Results (last 7 days)       Procedure Component Value Units Date/Time    Blood Culture #2 **CANNOT BE ORDERED STAT** [[id number]] Collected: 09/16/24 2251    Order Status: Sent Specimen: Blood from Hand, Right Updated: 09/17/24 0447    Blood Culture #1 **CANNOT BE ORDERED STAT** [[id number]] Collected: 09/16/24 2251    Order Status: Sent Specimen: Blood from Arm, Left Updated: 09/17/24 0446            "

## 2024-09-17 NOTE — PROGRESS NOTES
Inpatient Nutrition Assessment    Admit Date: 9/16/2024   Total duration of encounter: 1 day   Patient Age: 48 y.o.    Nutrition Recommendation/Prescription     When appropriate--ADAT to Low Na  Suggest boost breeze tid; Boost Breeze (provides 250 kcal, 9 g protein per serving)   Continue metoclopramide for gi motility  MVI/fe  Biweekly wt  Will monitor nutrition status     Communication of Recommendations: reviewed with nurse and reviewed with patient    Nutrition Assessment     Malnutrition Assessment/Nutrition-Focused Physical Exam    Malnutrition Context: chronic illness (09/17/24 1131)  Malnutrition Level: other (see comments) (does not meet malnutrition criteria) (09/17/24 1131)  Energy Intake (Malnutrition): less than 75% for greater than or equal to 1 month (09/17/24 1131)  Weight Loss (Malnutrition):  (does not meet criteria) (09/17/24 1131)     Orbital Region (Subcutaneous Fat Loss): well nourished  Upper Arm Region (Subcutaneous Fat Loss): well nourished        [place] Region (Muscle Loss): well nourished  Clavicle Bone Region (Muscle Loss): well nourished                             A minimum of two characteristics is recommended for diagnosis of either severe or non-severe malnutrition.    Chart Review    Reason Seen: continuous nutrition monitoring    Malnutrition Screening Tool Results   Have you recently lost weight without trying?: Unsure  Have you been eating poorly because of a decreased appetite?: Yes   MST Score: 3   Diagnosis:  Sepsis, lactic acidosis, ? R pyelonephritis, esophagitis, leukocytosis, AGMA, N/V, gastroparesis, ANNEL, HTN, Hx PE     Relevant Medical History: gastroparesis, HTN, PE, L side nephrectomy     Scheduled Medications:  folic acid, 1 mg, Daily  metoclopramide, 10 mg, Q6H  mupirocin, , BID  pantoprazole, 40 mg, Daily  piperacillin-tazobactam (Zosyn) IV (PEDS and ADULTS) (extended infusion is not appropriate), 4.5 g, Q8H  rivaroxaban, 20 mg, Daily with dinner  vancomycin  (VANCOCIN) IV (PEDS and ADULTS), 1,000 mg, Q12H    Continuous Infusions:  lactated ringers, Last Rate: 125 mL/hr at 09/17/24 0811    PRN Medications:  hydrALAZINE, 10 mg, Q6H PRN  melatonin, 6 mg, Nightly PRN  prochlorperazine, 2.5 mg, Q6H PRN  sodium chloride 0.9%, 10 mL, PRN  vancomycin - pharmacy to dose, , pharmacy to manage frequency    Calorie Containing IV Medications: no significant kcals from medications at this time    Recent Labs   Lab 09/16/24  2234 09/17/24  0200 09/17/24  0405 09/17/24  0430 09/17/24  0715    138  --   --  139   K 5.3* 5.4*  --   --  4.6   CALCIUM 9.2 8.6  --   --  8.4   PHOS 9.0* 7.2*  --   --   --    MG 1.80 1.50*  --   --   --    CL 98 99  --   --  99   CO2 6* <5*  --   --  11*   BUN 14.8 13.3  --   --  13.1   CREATININE 1.66* 1.44*  --   --  1.58*   EGFRNORACEVR 37 44  --   --  40   GLUCOSE 63* 64*  --   --  181*   BILITOT 0.5 0.7  --   --   --    ALKPHOS 120 105  --   --   --    * 91*  --   --   --    * 144*  --   --   --    ALBUMIN 3.8 3.3*  --   --   --    CRP  --   --   --  3.70  --    HGBA1C  --   --  4.6  --   --    WBC 18.16*  --  11.86*  --   --    HGB 13.4  --  12.8  --   --    HCT 41.5  --  36.8*  --   --      Nutrition Orders:  Diet NPO      Appetite/Oral Intake: NPO/NPO  Factors Affecting Nutritional Intake: altered gastrointestinal function, decreased appetite, nausea, NPO, and vomiting  Social Needs Impacting Access to Food: none identified  Food/Hinduism/Cultural Preferences: none reported  Food Allergies: none reported  Last Bowel Movement: 09/13/24  Wound(s):  none    Comments    (9/17) Pt remains NPO; admitted with N/V/abdominal pain x 1-2 days; still having N/V; hx gastroparesis--pt reported has difficulty with stomach weekly; on reglan for GI motility. Appetite---fair --up/down PTA; depending on stomach. No recent wt loss . Suggest use ONS when diet progressed and continue Reglan. Will monitor diet progression/tolerance. Labs acknowledged:  "Gluc (H)--no reported hx DM>     Anthropometrics    Height: 5' 4" (162.6 cm), Height Method: Stated  Last Weight: 74.8 kg (165 lb) (24), Weight Method: Bed Scale  BMI (Calculated): 28.3  BMI Classification: overweight (BMI 25-29.9)     Ideal Body Weight (IBW), Female: 120 lb     % Ideal Body Weight, Female (lb): 137.5 %                    Usual Body Weight (UBW), k.1 kg  % Usual Body Weight: 104.02     Usual Weight Provided By: patient and EMR weight history    Wt Readings from Last 5 Encounters:   24 74.8 kg (165 lb)   24 75 kg (165 lb 5.5 oz)   24 72.1 kg (159 lb)   24 72.3 kg (159 lb 6.4 oz)   24 72.2 kg (159 lb 2.8 oz)     Weight Change(s) Since Admission: no wt loss   Wt Readings from Last 1 Encounters:   24 74.8 kg (165 lb)   Admit Weight: 74.8 kg (165 lb) (24), Weight Method: Estimated    Estimated Needs    Weight Used For Calorie Calculations: 74.8 kg (164 lb 14.5 oz)  Energy Calorie Requirements (kcal): 1870 kcal/d; 25 gianna/kg  Energy Need Method: Kcal/kg  Weight Used For Protein Calculations: 74.8 kg (164 lb 14.5 oz)  Protein Requirements: 89 gm protein/d; 1.2 gm/kg  Fluid Requirements (mL): 1870 ml/d; 1mlcal        Enteral Nutrition     Patient not receiving enteral nutrition at this time.    Parenteral Nutrition     Patient not receiving parenteral nutrition support at this time.    Evaluation of Received Nutrient Intake    Calories: not meeting estimated needs  Protein: not meeting estimated needs    Patient Education     Not applicable.    Nutrition Diagnosis     PES: Inadequate oral intake related to chronic illness as evidenced by NPO/ N/V/hx eating 75% or less . (new)       Nutrition Interventions     Intervention(s): modified composition of meals/snacks, commercial beverage, multivitamin/mineral supplement therapy, and collaboration with other providers    Goal: Meet greater than 80% of nutritional needs by follow-up. (new)  Goal: " Maintain weight throughout hospitalization. (new)    Nutrition Goals & Monitoring     Dietitian will monitor: food and beverage intake, weight, glucose/endocrine profile, and gastrointestinal profile  Discharge planning: continue low Na diet with boost oral supplements  Nutrition Risk/Follow-Up: moderate (follow-up in 3-5 days)   Please consult if re-assessment needed sooner.

## 2024-09-17 NOTE — H&P
[facility] Medicine  History & Physical      Patient Name: Nahed Harvey  : 1975  MRN: [id number]  Patient Class: IP- Inpatient   Admission Date: 2024   Length of Stay: 0  Admitting Service: Hospital Medicine  Attending Physician: Tameka Mathis*  PCP: Brandon Harrison MD  Source of history: Patient and EMR.   Code status: Full Code    Chief Complaint   Abdominal Pain (Began vomiting last night around 0000, worsened this evening and also began having lower abdominal pain that she states has since migrated upwards towards chest. 4mg zofran per EMS. Hx gastroparesis), Nausea, and Vomiting    History of Present Illness   48 y.o. female with a past medical history of Gastroparesis, Hypertension, pulmonary embolism in , left-sided nephrectomy for donation in  presents to ED with primary complaint of abdominal pain and worsening N/V for the last 24 hours.  Patient states the pain was initially sharp but is now a dull pain.  Onset was gradual and has worsened since onset.  She states the pain initially started in her right upper abdomen, then it radiated to her right lower abdomen and now to her right chest.   Patient reports a chronic history of nausea with a hx of gastroparesis but states that nausea has worsened within the last day.  She reports multiple bouts of emesis, occurring every 20 minutes.   Patient reports that she has had similar symptoms in the past but is unable to remember what happened at those times due to current fatigue.  She denies any blood in emesis, fever, chills, changes in bowel movement, chest pain, dysuria, increased urinary frequency or urgency, vaginal discharge, cough, sore throat, nasal congestion.   She does report difficulty catching her breath.  She states that she has a history of panic attacks that have been occurring since she was diagnosed with pulmonary embolism in . She is not on any medications for this.     Social  history: Social drinker.  Reports smoking about 2-3 cigarettes daily. Denies recreational drug use.    ED course:  Upon initial presentation to the ED, patient was afebrile, tachycardic to 134, /88, RR 18, O2 sats 98% on room air.  CBC revealed leukocytosis with WBC of 18.  CMP revealed mild hyperkalemia with potassium of 5.3, AGMA with bicarb 6, chloride 98, ANNEL with creatinine of 1.66, elevated transaminases with ALT/AST of 103/163.  Initial lactic acid was elevated at 21.  CT AP without contrast revealed possible esophagitis and right-sided pyelonephritis.  CT CAP with IV contrast revealed same as above along with gastritis.  Patient received 3.5 L of LR.  Lactic acid was repeated and it was 19.  Internal medicine was consulted for further workup and management of lactic acidosis, esophagitis and pyelonephritis.    ROS     Review of Systems   Constitutional:  Positive for chills and malaise/fatigue. Negative for fever.   Respiratory:  Positive for shortness of breath. Negative for cough, sputum production and wheezing.    Cardiovascular:  Positive for chest pain. Negative for leg swelling.   Gastrointestinal:  Positive for abdominal pain, nausea and vomiting. Negative for constipation and diarrhea.   Genitourinary:  Negative for dysuria, frequency, hematuria and urgency.        Denies vaginal discharge     Past Medical History     Past Medical History:   Diagnosis Date    Gastroparesis     Hypertension      Past Surgical History     Past Surgical History:   Procedure Laterality Date    INSERTION OF INFERIOR VENA CAVAL FILTER N/A 9/16/2022    Procedure: Insertion, Filter, Inferior Vena Cava;  Surgeon: Juancarlos Victoria MD;  Location: [facility] CATH LAB;  Service: Cardiology;  Laterality: N/A;    IVC FILTER RETRIEVAL N/A 2/7/2024    Procedure: REMOVAL, FILTER, INFERIOR VENA CAVA;  Surgeon: Juancarlos Victoria MD;  Location: [facility] CATH LAB;  Service: Cardiology;  Laterality: N/A;  IVC FILTER REMOVAL    NEPHRECTOMY       PERCUTANEOUS MECHANICAL THROMBECTOMY OF VASCULAR GRAFT OF UPPER EXTREMITY  9/14/2022    Procedure: THROMBECTOMY, MECHANICAL, VASCULAR GRAFT, UPPER EXTREMITY, PERCUTANEOUS;  Surgeon: Juancarlos Victoria MD;  Location: [facility] CATH LAB;  Service: Cardiology;;    PHLEBOGRAPHY  9/16/2022    Procedure: VENOGRAM;  Surgeon: Juancarlos Victoria MD;  Location: [facility] CATH LAB;  Service: Cardiology;;    THROMBECTOMY  9/14/2022    Procedure: THROMBECTOMY;  Surgeon: Juancarlos Victoria MD;  Location: [facility] CATH LAB;  Service: Cardiology;;     Social History     Social History     Tobacco Use    Smoking status: Some Days     Types: Cigarettes    Smokeless tobacco: Not on file   Substance Use Topics    Alcohol use: Yes     Comment: RARELY      Family History   Reviewed and noncontributory    Allergies   Patient has no known allergies.  Home Medications     Prior to Admission medications    Medication Sig Start Date End Date Taking? Authorizing Provider   ALAHIST D 10-17.5 mg Tab Take 1 tablet by mouth every 4 (four) hours. 2/27/24   Provider, Historical   allopurinoL (ZYLOPRIM) 100 MG tablet Take 100 mg by mouth once daily.    Provider, Historical   buPROPion (WELLBUTRIN XL) 300 MG 24 hr tablet Take 300 mg by mouth once daily.    Provider, Historical   cetirizine (ZYRTEC) 5 MG tablet Take 5 mg by mouth once daily.    Provider, Historical   ciprofloxacin-dexAMETHasone 0.3-0.1% (CIPRODEX) 0.3-0.1 % DrpS Place 4 drops into both ears 2 (two) times daily. 2/27/24   Provider, Historical   cyanocobalamin (VITAMIN B-12) 1000 MCG tablet Take 100 mcg by mouth once daily.    Provider, Historical   diphenhydrAMINE (BENADRYL) 50 MG capsule Take 50 mg by mouth nightly as needed for Insomnia.    Provider, Historical   famotidine (PEPCID) 20 MG tablet Take 20 mg by mouth daily as needed for Heartburn.    Provider, Historical   fluticasone propionate (FLONASE) 50 mcg/actuation nasal spray 1 spray by Each Nostril route Daily. 2/27/24   Provider, Historical   furosemide  "(LASIX) 20 MG tablet TAKE 1 TABLET BY MOUTH EVERY DAY 9/3/24   Brandon Harrison MD   hydrALAZINE (APRESOLINE) 25 MG tablet Take 1 tablet (25 mg total) by mouth every 8 (eight) hours. 9/21/22 3/5/24  Javon Hernandez MD   MAGNESIUM ORAL Take 400 mg by mouth Daily.    Provider, Historical   metoprolol tartrate (LOPRESSOR) 50 MG tablet Take 50 mg by mouth 2 (two) times daily. 11/2/23   Provider, Historical   omeprazole (PRILOSEC) 20 MG capsule Take 20 mg by mouth once daily.    Provider, Historical   ondansetron (ZOFRAN-ODT) 4 MG TbDL Take 1 tablet (4 mg total) by mouth every 8 (eight) hours as needed (nausea/vomiting). 8/22/24   Juliana Harvey PA-C   progesterone (PROMETRIUM) 200 MG capsule Take 200 mg by mouth every evening. 12/4/23   Provider, Historical   promethazine (PHENERGAN) 12.5 MG Tab Take 12.5 mg by mouth every 6 (six) hours as needed.    Provider, Historical   rivaroxaban (XARELTO) 10 mg Tab Take 2 tablets (20 mg total) by mouth daily with dinner or evening meal. 10/11/22 3/5/24  Javon Hernandez MD   valACYclovir (VALTREX) 1000 MG tablet Take 1,000 mg by mouth daily as needed ("FEVER BLISTER").    Provider, Historical      Inpatient Medications   Scheduled Meds   buPROPion  300 mg Oral Daily    cyanocobalamin  100 mcg Oral Daily    metoprolol tartrate  50 mg Oral BID    pantoprazole  40 mg Oral Daily    piperacillin-tazobactam (Zosyn) IV (PEDS and ADULTS) (extended infusion is not appropriate)  4.5 g Intravenous Q8H    rivaroxaban  20 mg Oral Daily with dinner     Continuous Infusions   lactated ringers   Intravenous Continuous         PRN Meds    Current Facility-Administered Medications:     hydrALAZINE, 10 mg, Intravenous, Q6H PRN    melatonin, 6 mg, Oral, Nightly PRN    ondansetron, 4 mg, Intravenous, Q8H PRN    sodium chloride 0.9%, 10 mL, Intravenous, PRN    Pharmacy to dose Vancomycin consult, , , Once **AND** vancomycin - pharmacy to dose, , Intravenous, pharmacy to manage " "frequency    Physical Exam   Vital Signs  Temp:  [96.4 °F (35.8 °C)-97.8 °F (36.6 °C)]   Pulse:  [125-134]   Resp:  [18-24]   BP: (136-155)/()   SpO2:  [97 %-100 %]       Physical Exam  Vitals reviewed.   Constitutional:       General: She is in acute distress.      Appearance: She is ill-appearing. She is not diaphoretic.      Comments: Appears uncomfortable in bed. Anxious and shaking.   HENT:      Mouth/Throat:      Mouth: Mucous membranes are dry.      Pharynx: No oropharyngeal exudate or posterior oropharyngeal erythema.   Cardiovascular:      Rate and Rhythm: Regular rhythm. Tachycardia present.      Pulses: Normal pulses.      Heart sounds: Normal heart sounds. No murmur heard.     No friction rub.   Pulmonary:      Effort: Pulmonary effort is normal. No respiratory distress.      Breath sounds: Normal breath sounds. No stridor. No wheezing or rhonchi.   Abdominal:      General: Bowel sounds are normal. There is distension.      Palpations: Abdomen is soft.      Tenderness: There is abdominal tenderness. There is no guarding or rebound.      Comments: Mild generalized tenderness to palpation of all quadrants   Musculoskeletal:         General: No swelling or tenderness.   Neurological:      General: No focal deficit present.      Mental Status: She is alert and oriented to person, place, and time.          Labs   I have reviewed the following results below:  CBC  Recent Labs     09/16/24  2234   WBC 18.16*   RBC 3.69*   HGB 13.4   HCT 41.5   .5*   MCH 36.3*   MCHC 32.3*   RDW 14.1        Anemia  No results for input(s): "HAPTOGLOBIN", "FERRITIN", "IRON", "TIBC" in the last 72 hours.  Coags  No results for input(s): "INR", "APTT", "D-DIMER" in the last 72 hours.  Cardiac  No results for input(s): "BNP", "CPK", "CKMB", "TROPONINI" in the last 72 hours.  ABG/Lactate  No results for input(s): "PH", "PCO2", "PO2", "HCO3", "POCSATURATED", "BE", "LACTIC" in the last 72 hours.   Urinalysis  No " "results for input(s): "COLORUA", "APPEARANCEUA", "SGUA", "PHUA", "PROTEINUA", "GLUCOSEUA", "KETONESUA", "BLOODUA", "UROBILINOGEN", "NITRITESUA", "LEUKOCYTESUR" in the last 72 hours.   BMP  Recent Labs     09/16/24 2234      K 5.3*   CO2 6*   BUN 14.8   CREATININE 1.66*   GLUCOSE 63*   CALCIUM 9.2   MG 1.80   PHOS 9.0*     LFTs  Recent Labs     09/16/24 2234   ALBUMIN 3.8   GLOBULIN 3.7*   ALKPHOS 120   *   *   BILITOT 0.5     Inflammatory Markers  No results for input(s): "CRP", "LDH", "ESR" in the last 72 hours.  Lipid  No results for input(s): "CHOL", "TRIG", "LDL", "VLDL", "HDL" in the last 72 hours.  Diabetes  Recent Labs     09/16/24  2234   GLUCOSE 63*     Thyroid  No results for input(s): "TSH", "FREET4" in the last 72 hours.       Microbiology Results (last 7 days)       Procedure Component Value Units Date/Time    Blood Culture #1 **CANNOT BE ORDERED STAT** [[id number]] Collected: 09/16/24 2251    Order Status: Sent Specimen: Blood     Blood Culture #2 **CANNOT BE ORDERED STAT** [[id number]] Collected: 09/16/24 2251    Order Status: Sent Specimen: Blood            Imaging     CT Chest Abdomen Pelvis With IV Contrast (XPD) Routine Oral Contrast         CT Abdomen Pelvis  Without Contrast           Assessment & Plan     Sepsis of unknown source  Lactic acidosis  ?Right pyelonephritis  ?Esophagitis  Leukocytosis  - Met SIRS criteria 3/4 on admission - WBC: 18  HR: 134  RR: 23  T: 97.8  - Initial Lactic acid 21, repeat in the ED 2 hrs later was 19. Trending lactic acid q4  - CT CAP  with and without IV contrast done at the ED.  Results showed findings suggestive of right pyelonephritis, esophagitis and gastritis.    - CXR ordered and pending  - EKG reveals sinus tachycardia  - U/A ordered but not collected as patient is yet to void.   - Received resuscitation fluids at 30 mL/kg within the first 3 hours at the ED  - D5 LR @ 125 cc/hr  - Broad spectrum coverage with Vanc/Zosyn  - Blood cx " x2, Coags, D-dimer, Troponin, TSH, UA, UPT pending  - Strict intake and output  - Telemetry  - Supplemental oxygen prn      AGMA  - Initial AG of 40, bicarb 6, chloride 99  - Lactic acid levels as stated above  - ABG revealed PH of 7.26, PCO2 - 19, PO2 - 143  - Will give 3 amps of bicarb  - Repeat BMP ordered for 7am  - Consider Nephrology consult if no improvement      Abdominal pain  Intractable nausea & vomiting  Hx of gastroparesis  - Abdominal pain improved after receiving morphine in the ED  - IV Zofran 4 mg as needed for nausea       ANNEL  Left nephrectomy for kidney donation in 2006  - Cr: 1.66 on arrival  Baseline: 0.8-0.9  - Continue maintenance fluids  - UA, Utox pending  - Avoid nephrotoxic agents  - Will continue to monitor with morning labs      Hx of pulmonary embolus and DVT in 2022  - Initially had an IVC filter that was removed in 3/2024.   - Upon chart review, patient is on Xarelto      Hypertension  - Holding home Hydralazine due to inability to tolerate p.o. Defer to primary team on restarting home p.o. meds.  - PRN IV antihypertensives        Access: PIV  Antibiotics: Vanc/Zosyn  Diet: NPO  DVT Prophylaxis: Xarelto  GI Prophylaxis: Protonix  Fluids: D5 LR at 125cc/hr        Jelena Echols MD  [facility] Family Medicine Resident, [facility]

## 2024-09-17 NOTE — PLAN OF CARE
Problem: Infection  Goal: Absence of Infection Signs and Symptoms  Outcome: Progressing     Problem: Adult Inpatient Plan of Care  Goal: Plan of Care Review  Outcome: Progressing  Goal: Patient-Specific Goal (Individualized)  Outcome: Progressing  Goal: Absence of Hospital-Acquired Illness or Injury  Outcome: Progressing  Goal: Optimal Comfort and Wellbeing  Outcome: Progressing  Goal: Readiness for Transition of Care  Outcome: Progressing     Problem: Sepsis/Septic Shock  Goal: Optimal Coping  Outcome: Progressing  Goal: Absence of Bleeding  Outcome: Progressing  Goal: Blood Glucose Level Within Targeted Range  Outcome: Progressing  Goal: Absence of Infection Signs and Symptoms  Outcome: Progressing  Goal: Optimal Nutrition Intake  Outcome: Progressing

## 2024-09-17 NOTE — PLAN OF CARE
09/17/24 1115   Discharge Assessment   Assessment Type Discharge Planning Assessment   Confirmed/corrected address, phone number and insurance Yes   Confirmed Demographics Correct on Facesheet   Source of Information patient;health record   When was your last doctors appointment?   (Brandon Harrison)   Reason For Admission Lactic acidosis, Pyelonephritis, Tachycardia   People in Home spouse   Facility Arrived From: Home   Do you expect to return to your current living situation? Yes   Do you have help at home or someone to help you manage your care at home? Yes   Who are your caregiver(s) and their phone number(s)? BoRaphael morrison (Spouse)  [phone]   Walking or Climbing Stairs Difficulty no   Dressing/Bathing Difficulty no   Home Layout Able to live on 1st floor   Equipment Currently Used at Home none   Readmission within 30 days? No   Patient currently being followed by outpatient case management? No   Do you currently have service(s) that help you manage your care at home? No   Do you take prescription medications? Yes  (ALONZO - Lucho Mo)   Do you have prescription coverage? Yes   Coverage [employer]   Do you have any problems affording any of your prescribed medications? No   Is the patient taking medications as prescribed? yes   Who is going to help you get home at discharge? Family   How do you get to doctors appointments? car, drives self   Are you on dialysis? No   Discharge Plan A Home with family   DME Needed Upon Discharge  none   Discharge Plan discussed with: Patient   Transition of Care Barriers None   OTHER   Name(s) of People in Home BoRaphael morrison (Spouse)  [phone]     Pt resides with spouse, Raphael; Employed part time as a dental assistant; Independent with ADL's; CM to follow for d/c planning needs.

## 2024-09-18 PROBLEM — F17.200 TOBACCO DEPENDENCY: Status: ACTIVE | Noted: 2024-09-18

## 2024-09-18 LAB
ADDRESS: NORMAL
ALBUMIN SERPL-MCNC: 2.8 G/DL (ref 3.5–5)
ALBUMIN/GLOB SERPL: 1 RATIO (ref 1.1–2)
ALP SERPL-CCNC: 69 UNIT/L (ref 40–150)
ALT SERPL-CCNC: 79 UNIT/L (ref 0–55)
ANION GAP SERPL CALC-SCNC: 11 MEQ/L
ARSENIC BLD-MCNC: <1 NG/ML
AST SERPL-CCNC: 150 UNIT/L (ref 5–34)
ATTENDING PHYSICIAN NAME: NORMAL
BASOPHILS # BLD AUTO: 0.02 X10(3)/MCL
BASOPHILS NFR BLD AUTO: 0.3 %
BILIRUB SERPL-MCNC: 0.7 MG/DL
BUN SERPL-MCNC: 15.3 MG/DL (ref 9.8–20.1)
CADMIUM BLD-MCNC: 0.5 NG/ML
CALCIUM SERPL-MCNC: 8 MG/DL (ref 8.4–10.2)
CHLORIDE SERPL-SCNC: 102 MMOL/L (ref 98–107)
CO2 SERPL-SCNC: 26 MMOL/L (ref 22–29)
COUNTY OF RESIDENCE: NORMAL
CREAT SERPL-MCNC: 1.43 MG/DL (ref 0.55–1.02)
CREAT/UREA NIT SERPL: 11
EMPLOYER NAME: NORMAL
EOSINOPHIL # BLD AUTO: 0.01 X10(3)/MCL (ref 0–0.9)
EOSINOPHIL NFR BLD AUTO: 0.1 %
ERYTHROCYTE [DISTWIDTH] IN BLOOD BY AUTOMATED COUNT: 14.1 % (ref 11.5–17)
FACILITY PHONE #: NORMAL
GFR SERPLBLD CREATININE-BSD FMLA CKD-EPI: 45 ML/MIN/1.73/M2
GLOBULIN SER-MCNC: 2.7 GM/DL (ref 2.4–3.5)
GLUCOSE SERPL-MCNC: 112 MG/DL (ref 74–100)
HCT VFR BLD AUTO: 31.7 % (ref 37–47)
HGB BLD-MCNC: 10.7 G/DL (ref 12–16)
HOLD SPECIMEN: NORMAL
HOLD SPECIMEN: NORMAL
HX OF OCCUPATION: NORMAL
IMM GRANULOCYTES # BLD AUTO: 0.02 X10(3)/MCL (ref 0–0.04)
IMM GRANULOCYTES NFR BLD AUTO: 0.3 %
INR PPP: 1.4
LEAD BLDV-MCNC: <1 MCG/DL
LYMPHOCYTES # BLD AUTO: 0.79 X10(3)/MCL (ref 0.6–4.6)
LYMPHOCYTES NFR BLD AUTO: 10.7 %
M HEALTH CARE PROVIDER PHONE: NORMAL
M PATIENT CITY: NORMAL
MAGNESIUM SERPL-MCNC: 1.9 MG/DL (ref 1.6–2.6)
MCH RBC QN AUTO: 34.4 PG (ref 27–31)
MCHC RBC AUTO-ENTMCNC: 33.8 G/DL (ref 33–36)
MCV RBC AUTO: 101.9 FL (ref 80–94)
MERCURY BLD-MCNC: 3 NG/ML
MONOCYTES # BLD AUTO: 0.37 X10(3)/MCL (ref 0.1–1.3)
MONOCYTES NFR BLD AUTO: 5 %
NEUTROPHILS # BLD AUTO: 6.17 X10(3)/MCL (ref 2.1–9.2)
NEUTROPHILS NFR BLD AUTO: 83.6 %
NRBC BLD AUTO-RTO: 0 %
PHONE #: NORMAL
PHOSPHATE SERPL-MCNC: 1.1 MG/DL (ref 2.3–4.7)
PLATELET # BLD AUTO: 76 X10(3)/MCL (ref 130–400)
PLATELETS.RETICULATED NFR BLD AUTO: 8.6 % (ref 0.9–11.2)
PMV BLD AUTO: 12.1 FL (ref 7.4–10.4)
POSTAL CODE: NORMAL
POTASSIUM SERPL-SCNC: 3.5 MMOL/L (ref 3.5–5.1)
PROT SERPL-MCNC: 5.5 GM/DL (ref 6.4–8.3)
PROTHROMBIN TIME: 17.5 SECONDS (ref 11.4–14)
PROVIDER CITY: NORMAL
PROVIDER POSTAL CODE: NORMAL
PROVIDER STATE: NORMAL
RBC # BLD AUTO: 3.11 X10(6)/MCL (ref 4.2–5.4)
REFER PHYSICIAN ADDR: NORMAL
SODIUM SERPL-SCNC: 139 MMOL/L (ref 136–145)
SPECIMEN SOURCE: NORMAL
STATE OF RESIDENCE: NORMAL
VANCOMYCIN TROUGH SERPL-MCNC: 21.8 UG/ML (ref 15–20)
WBC # BLD AUTO: 7.38 X10(3)/MCL (ref 4.5–11.5)

## 2024-09-18 PROCEDURE — 25000003 PHARM REV CODE 250

## 2024-09-18 PROCEDURE — 80053 COMPREHEN METABOLIC PANEL: CPT

## 2024-09-18 PROCEDURE — 83735 ASSAY OF MAGNESIUM: CPT

## 2024-09-18 PROCEDURE — 27000221 HC OXYGEN, UP TO 24 HOURS

## 2024-09-18 PROCEDURE — 63600175 PHARM REV CODE 636 W HCPCS

## 2024-09-18 PROCEDURE — 94761 N-INVAS EAR/PLS OXIMETRY MLT: CPT

## 2024-09-18 PROCEDURE — 36415 COLL VENOUS BLD VENIPUNCTURE: CPT

## 2024-09-18 PROCEDURE — 85610 PROTHROMBIN TIME: CPT

## 2024-09-18 PROCEDURE — 85025 COMPLETE CBC W/AUTO DIFF WBC: CPT

## 2024-09-18 PROCEDURE — 84100 ASSAY OF PHOSPHORUS: CPT

## 2024-09-18 PROCEDURE — 21400001 HC TELEMETRY ROOM

## 2024-09-18 PROCEDURE — 36415 COLL VENOUS BLD VENIPUNCTURE: CPT | Performed by: STUDENT IN AN ORGANIZED HEALTH CARE EDUCATION/TRAINING PROGRAM

## 2024-09-18 PROCEDURE — 80202 ASSAY OF VANCOMYCIN: CPT

## 2024-09-18 RX ORDER — SUCRALFATE 1 G/10ML
1 SUSPENSION ORAL EVERY 6 HOURS
Status: DISCONTINUED | OUTPATIENT
Start: 2024-09-18 | End: 2024-09-20 | Stop reason: HOSPADM

## 2024-09-18 RX ORDER — IBUPROFEN 200 MG
1 TABLET ORAL DAILY
Status: DISCONTINUED | OUTPATIENT
Start: 2024-09-18 | End: 2024-09-20 | Stop reason: HOSPADM

## 2024-09-18 RX ORDER — SODIUM,POTASSIUM PHOSPHATES 280-250MG
1 POWDER IN PACKET (EA) ORAL ONCE
Status: COMPLETED | OUTPATIENT
Start: 2024-09-18 | End: 2024-09-18

## 2024-09-18 RX ADMIN — SODIUM CHLORIDE, POTASSIUM CHLORIDE, SODIUM LACTATE AND CALCIUM CHLORIDE: 600; 310; 30; 20 INJECTION, SOLUTION INTRAVENOUS at 09:09

## 2024-09-18 RX ADMIN — SODIUM CHLORIDE, POTASSIUM CHLORIDE, SODIUM LACTATE AND CALCIUM CHLORIDE: 600; 310; 30; 20 INJECTION, SOLUTION INTRAVENOUS at 08:09

## 2024-09-18 RX ADMIN — FOLIC ACID 1 MG: 1 TABLET ORAL at 09:09

## 2024-09-18 RX ADMIN — METOCLOPRAMIDE 10 MG: 5 INJECTION, SOLUTION INTRAMUSCULAR; INTRAVENOUS at 06:09

## 2024-09-18 RX ADMIN — POTASSIUM & SODIUM PHOSPHATES POWDER PACK 280-160-250 MG 1 PACKET: 280-160-250 PACK at 07:09

## 2024-09-18 RX ADMIN — SUCRALFATE 1 G: 1 SUSPENSION ORAL at 12:09

## 2024-09-18 RX ADMIN — VANCOMYCIN HYDROCHLORIDE 1000 MG: 1 INJECTION, POWDER, LYOPHILIZED, FOR SOLUTION INTRAVENOUS at 05:09

## 2024-09-18 RX ADMIN — PIPERACILLIN SODIUM AND TAZOBACTAM SODIUM 4.5 G: 4; .5 INJECTION, POWDER, LYOPHILIZED, FOR SOLUTION INTRAVENOUS at 03:09

## 2024-09-18 RX ADMIN — PROCHLORPERAZINE EDISYLATE 2.5 MG: 5 INJECTION INTRAMUSCULAR; INTRAVENOUS at 09:09

## 2024-09-18 RX ADMIN — SUCRALFATE 1 G: 1 SUSPENSION ORAL at 05:09

## 2024-09-18 RX ADMIN — METOCLOPRAMIDE 10 MG: 5 INJECTION, SOLUTION INTRAMUSCULAR; INTRAVENOUS at 12:09

## 2024-09-18 RX ADMIN — MUPIROCIN: 20 OINTMENT TOPICAL at 08:09

## 2024-09-18 RX ADMIN — PANTOPRAZOLE SODIUM 40 MG: 40 INJECTION, POWDER, FOR SOLUTION INTRAVENOUS at 09:09

## 2024-09-18 RX ADMIN — MUPIROCIN: 20 OINTMENT TOPICAL at 09:09

## 2024-09-18 RX ADMIN — RIVAROXABAN 15 MG: 15 TABLET, FILM COATED ORAL at 05:09

## 2024-09-18 RX ADMIN — METOCLOPRAMIDE 10 MG: 5 INJECTION, SOLUTION INTRAMUSCULAR; INTRAVENOUS at 05:09

## 2024-09-18 RX ADMIN — SODIUM PHOSPHATE, MONOBASIC, MONOHYDRATE AND SODIUM PHOSPHATE, DIBASIC, ANHYDROUS 30 MMOL: 142; 276 INJECTION, SOLUTION INTRAVENOUS at 09:09

## 2024-09-18 RX ADMIN — PIPERACILLIN SODIUM AND TAZOBACTAM SODIUM 4.5 G: 4; .5 INJECTION, POWDER, LYOPHILIZED, FOR SOLUTION INTRAVENOUS at 07:09

## 2024-09-18 RX ADMIN — PIPERACILLIN SODIUM AND TAZOBACTAM SODIUM 4.5 G: 4; .5 INJECTION, POWDER, LYOPHILIZED, FOR SOLUTION INTRAVENOUS at 11:09

## 2024-09-18 NOTE — PROGRESS NOTES
[facility] Medicine Wards Progress Note     Resident Team: [facility] Medicine List 1  Attending Physician: Hunter Javed MD    Date of Admit: 9/16/2024  Current Hospital Day: 3     Subjective:      Brief HPI:  48 y.o. female with a past medical history of Gastroparesis, Hypertension, pulmonary embolism in 2022, left-sided nephrectomy for donation in 2006 presents to ED with primary complaint of abdominal pain and worsening N/V for the last 24 hours.  Patient states the pain was initially sharp but is now a dull pain.  Onset was gradual and has worsened since onset.  She states the pain initially started in her right upper abdomen, then it radiated to her right lower abdomen and now to her right chest.   Patient reports a chronic history of nausea with a hx of gastroparesis but states that nausea has worsened within the last day.  She reports multiple bouts of emesis, occurring every 20 minutes.   Patient reports that she has had similar symptoms in the past but is unable to remember what happened at those times due to current fatigue.  She denies any blood in emesis, fever, chills, changes in bowel movement, chest pain, dysuria, increased urinary frequency or urgency, vaginal discharge, cough, sore throat, nasal congestion.   She does report difficulty catching her breath.  She states that she has a history of panic attacks that have been occurring since she was diagnosed with pulmonary embolism in 2022. She is not on any medications for this.      Social history: Social drinker.  Reports smoking about 2-3 cigarettes daily. Denies recreational drug use.     ED course:  Upon initial presentation to the ED, patient was afebrile, tachycardic to 134, /88, RR 18, O2 sats 98% on room air.  CBC revealed leukocytosis with WBC of 18.  CMP revealed mild hyperkalemia with potassium of 5.3, AGMA with bicarb 6, chloride 98, ANNEL with creatinine of 1.66, elevated transaminases with ALT/AST of 103/163.  Initial lactic acid was elevated  at 21.  CT AP without contrast revealed possible esophagitis and right-sided pyelonephritis.  CT CAP with IV contrast revealed same as above along with gastritis.  Patient received 3.5 L of LR.  Lactic acid was repeated and it was 19.  Internal medicine was consulted for further workup and management of lactic acidosis, esophagitis and pyelonephritis.    Interval History: NAEON. VSS, BP elevated 146/93.  Urine output 950 cc.  Leukocytosis resolved, worsening anemia.  Folate less than 2.2, vitamin B12 1726.  PT 19.8 INR 1.7.  PTT 37.2, D-dimer 3.53. Kidney function improving, creatinine 1.43.  Mag normal, hypophosphatemia 1.1.  Elevated LFTs AST//79.  Elevated troponin 0.109.  Lactic acid normalized 1.7.  Low acetaminophen and salicylate levels, low serum alcohol.  UDS positive for opioids and cannabinoids.  UA unremarkable.  ABG respiratory alkalosis 7.26/143/19/-.  CT abdomen and pelvis 9/16 showed edematous lower esophagus and severe hepatic steatosis.  CT chest abdomen and pelvis 9/17 showed esophagitis.  Repleted potassium, sodium, phosphorus.    Review of Systems   Constitutional:  Negative for chills and fever.   Eyes:  Negative for blurred vision and double vision.   Respiratory:  Negative for cough and shortness of breath.    Cardiovascular:  Negative for chest pain, palpitations and leg swelling.   Gastrointestinal:  Positive for abdominal pain (Right upper quadrant pain) and nausea. Negative for diarrhea and vomiting.   Genitourinary:  Negative for dysuria, frequency and urgency.   Musculoskeletal:  Negative for back pain, joint pain and neck pain.   Skin:  Negative for itching and rash.   Neurological:  Negative for weakness and headaches.          Objective:     Vital Signs:  Vital Signs (Most Recent):  Temp: 98.2 °F (36.8 °C) (09/18/24 0347)  Pulse: 82 (09/18/24 0347)  Resp: 18 (09/18/24 0347)  BP: (!) 146/93 (09/18/24 0347)  SpO2: 100 % (09/18/24 0347) Vital Signs (24h Range):  Temp:  [98.1 °F  (36.7 °C)-98.8 °F (37.1 °C)] 98.2 °F (36.8 °C)  Pulse:  [] 82  Resp:  [11-18] 18  SpO2:  [99 %-100 %] 100 %  BP: (107-160)/() 146/93   Body mass index is 28.32 kg/m².     Intake/Output Summary (Last 24 hours) at 9/18/2024 0615  Last data filed at 9/18/2024 0603  Gross per 24 hour   Intake 3852.68 ml   Output 950 ml   Net 2902.68 ml       Physical Exam  Vitals and nursing note reviewed.   Constitutional:       Appearance: Normal appearance.   HENT:      Head: Normocephalic and atraumatic.      Mouth/Throat:      Mouth: Mucous membranes are moist.      Pharynx: Oropharynx is clear.   Eyes:      Extraocular Movements: Extraocular movements intact.      Conjunctiva/sclera: Conjunctivae normal.      Pupils: Pupils are equal, round, and reactive to light.   Cardiovascular:      Rate and Rhythm: Normal rate and regular rhythm.      Pulses: Normal pulses.      Heart sounds: Normal heart sounds. No murmur heard.     No friction rub. No gallop.   Pulmonary:      Effort: Pulmonary effort is normal. No respiratory distress.      Breath sounds: Normal breath sounds. No stridor. No wheezing, rhonchi or rales.   Chest:      Chest wall: No tenderness.   Abdominal:      General: Abdomen is flat. Bowel sounds are normal. There is no distension.      Palpations: Abdomen is soft. There is no mass.      Tenderness: There is abdominal tenderness (RUQ). There is no guarding or rebound.      Hernia: No hernia is present.   Musculoskeletal:         General: No swelling, tenderness, deformity or signs of injury.      Cervical back: Normal range of motion and neck supple. No rigidity or tenderness.      Right lower leg: No edema.      Left lower leg: No edema.   Skin:     General: Skin is warm and dry.      Capillary Refill: Capillary refill takes less than 2 seconds.      Coloration: Skin is not jaundiced or pale.      Findings: No bruising, erythema, lesion or rash.   Neurological:      General: No focal deficit present.       "Mental Status: She is alert and oriented to person, place, and time.             Laboratory:    Recent Labs   Lab 09/18/24 0330   WBC 7.38   HGB 10.7*   HCT 31.7*   PLT 76*   .9*   RDW 14.1     Recent Labs   Lab 09/17/24 2202   TROPONINI 0.109*     Recent Labs   Lab 09/17/24 2202   TROPONINI 0.109*     No results for input(s): "CHOL", "HDL", "LDLCALC", "TRIG", "CHOLHDL" in the last 168 hours. Recent Labs   Lab 09/18/24  0330      K 3.5      CO2 26   BUN 15.3   CREATININE 1.43*   CALCIUM 8.0*   MG 1.90   PHOS 1.1*     Recent Labs   Lab 09/18/24 0330   ALBUMIN 2.8*   BILITOT 0.7   *   ALKPHOS 69   ALT 79*     Recent Labs   Lab 09/17/24  0200 09/17/24  0405   [id number] 1,726*  --    FOLATE  --  <2.2*     Recent Labs   Lab 09/17/24  0236 09/17/24  0405   TSH  --  0.900   HGBA1C  --  4.6   INR 1.7*  --           Microbiology Data:  Microbiology Results (last 7 days)       Procedure Component Value Units Date/Time    Blood Culture #2 **CANNOT BE ORDERED STAT** [[id number]] Collected: 09/16/24 2251    Order Status: Resulted Specimen: Blood from Hand, Right Updated: 09/17/24 0447    Blood Culture #1 **CANNOT BE ORDERED STAT** [[id number]] Collected: 09/16/24 2251    Order Status: Resulted Specimen: Blood from Arm, Left Updated: 09/17/24 0446               Radiology:  CT Abdomen Pelvis  Without Contrast 09/16/2024    Narrative  EXAMINATION:  CT ABDOMEN PELVIS WITHOUT CONTRAST    CLINICAL HISTORY:  Abdominal abscess/infection suspected;    TECHNIQUE:  Helically acquired axial images, sagittal and coronal reformations were obtained from the lung bases to the pubic symphysis without the IV administration of contrast.    Automated tube current modulation, weight-based exposure dosing, and/or iterative reconstruction technique utilized to reach lowest reasonably achievable exposure rate.    DLP: 487 mGy*cm    COMPARISON:  CT chest abdomen pelvis 09/13/2022    FINDINGS:  HEART: Normal in size. No " pericardial effusion.    LUNG BASES: Well aerated.    LIVER: Severe hepatic steatosis.  Defer to prior contrast enhanced exam regarding probable hemangiomata and cysts.    BILIARY: Gallbladder is not overly distended.    PANCREAS: No inflammatory change.    SPLEEN: Normal in size    ADRENALS: No mass.    KIDNEYS/URETERS: Postop left nephrectomy.  No right hydronephrosis.  Nonspecific perinephric stranding.    GI TRACT/MESENTERY: Evaluation of the bowel is limited without contrast.  Edema at the lower esophagus.  No evidence of bowel obstruction.    PERITONEUM: No free fluid.No free air.    LYMPH NODES: No enlarged lymph nodes by size criteria.    VASCULATURE: Aortoiliac atherosclerosis.    BLADDER: Normal appearance given degree of distention.    REPRODUCTIVE ORGANS: Decreased size probable subserosal uterine fibroid.    ABDOMINAL WALL: Unremarkable.    BONES: No acute osseous abnormality.    Impression  1. Edematous appearance of the lower esophagus  2. Severe hepatic steatosis  3. The preliminary and final reports are concordant.      Electronically signed by: Amairani Reyes  Date:    09/17/2024  Time:    11:03         Imaging Results              X-Ray Chest 1 View (Final result)  Result time 09/17/24 07:47:11      Final result by Yaron Ventura MD (09/17/24 07:47:11)                   Impression:      Minimal blunting of the left costophrenic angle might indicate a small left-sided pleural effusion.    Otherwise no active pulmonary disease      Electronically signed by: Yaron Ventura  Date:    09/17/2024  Time:    07:47               Narrative:    EXAMINATION:  XR CHEST 1 VIEW    CPT 95587    CLINICAL HISTORY:  shortness of breath;    COMPARISON:  August 22, 2024    FINDINGS:  Examination reveals mediastinal silhouette to be within normal limits cardiac silhouette is not enlarged there is some blunting of the left costophrenic angle which may indicate the presence of left-sided pleural effusion.    No  focal consolidative changes atelectasis or other effusions identified                                       CT Chest Abdomen Pelvis With IV Contrast (XPD) Routine Oral Contrast (Final result)  Result time 09/17/24 10:50:21      Final result by Amairani Reyes MD (09/17/24 10:50:21)                   Impression:      1. Changes suggestive of esophagitis.  2. The preliminary and final reports are concordant.      Electronically signed by: Amairani Reyes  Date:    09/17/2024  Time:    10:50               Narrative:    EXAMINATION:  CT CHEST ABDOMEN PELVIS WITH IV CONTRAST (XPD)    CLINICAL HISTORY:  Aortic dissection suspected;    TECHNIQUE:  Helical acquisition with axial, sagittal and coronal reformations obtained from the thoracic inlet to the pubic symphysis following the IV administration of contrast.    Automated tube current modulation, weight-based exposure dosing, and/or iterative reconstruction technique utilized to reach lowest reasonably achievable exposure rate.    DLP: 382 mGy*cm    COMPARISON:  CT abdomen pelvis 09/16/2024, CT chest 12/09/2023, CT chest abdomen pelvis 09/13/2022    FINDINGS:  BASE OF NECK: No significant abnormality.    HEART: Normal size. No effusion.    THORACIC VASCULATURE: No aortic aneurysm and no significant atherosclerosis.Pulmonary arteries enhance normally.    AGUSTÍN/MEDIASTINUM: The wall of the esophagus appears thickened and edematous with periesophageal fat stranding.    AIRWAYS: Patent.    LUNGS/PLEURA: No lobar consolidation, pleural effusion or pneumothorax.  Very mild ground-glass density at the lung bases may be related to developing atelectasis or mild pneumonitis.    THORACIC SOFT TISSUES: Unremarkable.    LIVER: Severe hepatic steatosis.  Probable small hepatic cysts and hemangioma, similar to prior exam.    BILIARY: No calcified gallstones.    PANCREAS: No inflammatory change.    SPLEEN: Normal in size    ADRENALS: No mass.    KIDNEYS/URETERS: No right  hydronephrosis.  Left kidney surgically absent.  No hydronephrosis.    GI TRACT/MESENTERY:  No evidence of bowel obstruction.  Colonic diverticulosis without acute inflammatory change.    PERITONEUM: No free fluid.No free air.    LYMPH NODES: No enlarged lymph nodes by size criteria.    ABDOMINOPELVIC VASCULATURE: Mild aortic atherosclerosis.    BLADDER: Normal appearance given degree of distention.    REPRODUCTIVE ORGANS: Chronic right adnexal lesion, decreased in size compared to prior exam in 2022.  Lesion currently measures 6 cm; previously 10 cm.  Suspect subserosal fibroid.    ABDOMINAL WALL: Unremarkable.    BONES: No acute osseous abnormality.                        Preliminary result by Bakari Soolrio MD (09/17/24 01:14:17)                   Impression:    1. There is diffuse wall thickening of the collapsed esophagus with associated mucosal thickening consistent with esophagitis.  2. There is diffuse gastric wall thickening with associated mucosal wall thickening which may represent an element of gastritis.  3. There is a 2.6 cm peripherally enhancing ovoid hypodensity along anterior myometrium centered on series 2 image 173. Correlate clinically as regards additional evaluation and follow-up.  4. Details and other findings as discussed above.               Narrative:    START OF REPORT:  Technique: CT Scan of the chest abdomen and pelvis was performed with intravenous contrast with axial as well as sagittal and, coronal images.    Dosage Information: Automated Exposure Control was utilized.    Comparison: Comparison is with study dated 2024-09-16 23:04:03.    Clinical History: Abdominal Pain(Began vomiting last night around 0000, worsened this evening and also began having lower abdominal pain that she states has since migrated upwards towards chest. 4mg zofran per EMS. Hx gastroparesis) Nausea Vomiting.    Findings:  Soft Tissues: Unremarkable.  Lines and Tubes: None.  Neck: The visualized soft tissues  of the neck appear unremarkable.  Mediastinum: There is diffuse wall thickening of the collapsed esophagus with associated mucosal thickening consistent with esophagitis.  Heart: Mild cardiomegaly is seen.  Aorta: Unremarkable appearing aorta. No aortic dissection or aneurysm is seen.  Pulmonary Arteries: No filling defects are seen in the pulmonary arteries to suggest pulmonary embolus to the sensitivity of the study.  Lungs: Mild streaky linear opacity is seen consistent with scarring subsegmental atelectasis. No focal infiltrate or consolidation identified.  Pleura: No effusions or pneumothorax are identified.  Bony Structures:  Spine: Mild spondylolytic changes are seen in the thoracic spine.  Ribs: The ribs appear unremarkable.  Abdomen:  Abdominal Wall: No abdominal wall pathology is seen.  Liver: Pronounced fatty infiltration of the liver is present. There is a 1.8 cm focal area of fat sparing in the lateral right hepatic lobe on series 2 image 93 also seen in the non contrast prior study. There is better delineation of a subcentimeter cyst in the inferolateral right hepatic lobe.  Biliary System: No intrahepatic or extrahepatic biliary duct dilatation is seen.  Gallbladder: The gallbladder appears unremarkable.  Pancreas: The pancreas appears unremarkable.  Spleen: The spleen appears unremarkable.  Adrenals: The adrenal glands appear unremarkable.  Kidneys: The right kidney appears unremarkable with no stones cysts masses or hydronephrosis. Nonspecific perinephric fat stranding is noted on the right. Surgical clips are seen in the left renal bed which may reflect prior nephrectomy, correlate with surgical history.  Aorta: The abdominal aorta appears unremarkable.  IVC: Unremarkable.  Bowel:  Stomach: There is diffuse gastric wall thickening with associated mucosal wall thickening which may represent an element of gastritis.  Duodenum: Unremarkable appearing duodenum.  Small Bowel: The small bowel appears  unremarkable.  Colon: There is moderate stool in the descending and sigmoid colon which could reflect an element of constipation. A few diverticula are seen in the transverse colon. No associated inflammatory stranding is seen to suggest diverticulitis.  Appendix: The appendix is not identified but no inflammatory changes are seen in the right lower quadrant to suggest appendicitis.  Peritoneum: No intraperitoneal free air or ascites is seen.    Pelvis:  Bladder: The bladder appears unremarkable.  Female:  Uterus: There is a 2.6 cm peripherally enhancing ovoid hypodensity along anterior myometrium centered on series 2 image 173.  Ovaries: No adnexal masses are seen.    Bony structures:  Dorsal Spine: There is mild spondylosis of the visualized dorsal spine.  Bony Pelvis: The visualized bony structures of the pelvis appear unremarkable.                                         CT Abdomen Pelvis  Without Contrast (Final result)  Result time 09/17/24 11:03:54      Final result by Amairani Reyes MD (09/17/24 11:03:54)                   Impression:      1. Edematous appearance of the lower esophagus  2. Severe hepatic steatosis  3. The preliminary and final reports are concordant.      Electronically signed by: Amairani Reyes  Date:    09/17/2024  Time:    11:03               Narrative:    EXAMINATION:  CT ABDOMEN PELVIS WITHOUT CONTRAST    CLINICAL HISTORY:  Abdominal abscess/infection suspected;    TECHNIQUE:  Helically acquired axial images, sagittal and coronal reformations were obtained from the lung bases to the pubic symphysis without the IV administration of contrast.    Automated tube current modulation, weight-based exposure dosing, and/or iterative reconstruction technique utilized to reach lowest reasonably achievable exposure rate.    DLP: 487 mGy*cm    COMPARISON:  CT chest abdomen pelvis 09/13/2022    FINDINGS:  HEART: Normal in size. No pericardial effusion.    LUNG BASES: Well aerated.    LIVER:  Severe hepatic steatosis.  Defer to prior contrast enhanced exam regarding probable hemangiomata and cysts.    BILIARY: Gallbladder is not overly distended.    PANCREAS: No inflammatory change.    SPLEEN: Normal in size    ADRENALS: No mass.    KIDNEYS/URETERS: Postop left nephrectomy.  No right hydronephrosis.  Nonspecific perinephric stranding.    GI TRACT/MESENTERY: Evaluation of the bowel is limited without contrast.  Edema at the lower esophagus.  No evidence of bowel obstruction.    PERITONEUM: No free fluid.No free air.    LYMPH NODES: No enlarged lymph nodes by size criteria.    VASCULATURE: Aortoiliac atherosclerosis.    BLADDER: Normal appearance given degree of distention.    REPRODUCTIVE ORGANS: Decreased size probable subserosal uterine fibroid.    ABDOMINAL WALL: Unremarkable.    BONES: No acute osseous abnormality.                        Preliminary result by Bakari Solorio MD (09/16/24 23:39:42)                   Impression:    1. There appears to be mild thickening versus underdistention of the distal esophagus. Correlate clinically as regards possible reflux esophagitis.  2. There is mildly prominent perinephric stranding on the right. The right kidney otherwise appears unremarkable with no stones cysts masses or hydronephrosis. Correlate with clinical and laboratory findings as regards possible right-sided pyelonephritis.  3. No definite acute intraabdominal or pelvic solid organ or bowel pathology identified on this noncontrast scan. Details and other findings as discussed above.               Narrative:    START OF REPORT:  Technique: CT of the abdomen and pelvis was performed with axial images as well as sagittal and coronal reconstruction images without intravenous contrast.    Comparison: None available.    Clinical History: Abdominal Pain(Began vomiting last night around 0000, worsened this evening and also began having lower abdominal pain that she states has since migrated upwards  towards chest. 4mg zofran per EMS. Hx gastroparesis) Nausea Vomiting.    Dosage Information: Automated Exposure Control was utilized.    Findings:  Lines and Tubes: None.  Thorax:  Lungs: The visualized lung bases appear unremarkable.  Pleura: No effusions or thickening are seen. No pneumothorax is seen in the visualized lung bases.  Heart: The heart size is within normal limits.  Abdomen:  Abdominal Wall: No abdominal wall pathology is seen.  Liver: Pronounced fatty infiltration of the liver is present. The liver otherwise appears unremarkable.  Biliary System: No intrahepatic or extrahepatic biliary duct dilatation is seen.  Gallbladder: Moderate hyperdensity is seen in the gallbladder which may represent sludge. The gallbladder otherwise appears unremarkable.  Pancreas: The pancreas appears unremarkable.  Spleen: The spleen appears unremarkable.  Adrenals: The adrenal glands appear unremarkable.  Kidneys: Post-nephrectomy changes are seen in the left. There is mildly prominent perinephric stranding on the right. The right kidney otherwise appears unremarkable with no stones cysts masses or hydronephrosis.  Aorta: There is minimal calcification of the abdominal aorta and its branches.  Bowel:  Esophagus: There appears to be mild thickening versus underdistention of the distal esophagus.  Stomach: The stomach appears unremarkable.  Duodenum: Unremarkable appearing duodenum.  Small Bowel: The small bowel appears unremarkable.  Colon: Nondistended. There is moderate stool in the colon which could reflect an element of constipation.  Appendix: The appendix appears unremarkable and is seen on Image 81, Series 2 through Image 83, Series 2.  Peritoneum: No intraperitoneal free air or ascites is seen.    Pelvis:  Bladder: The bladder appears unremarkable.  Female:  Uterus: The uterus appears prominent with a lobulated contour. This is suggestive of uterine myomas.  Ovaries: No adnexal masses are seen.    Bony  "structures:  Dorsal Spine: There is mild multilevel spondylosis of the visualized dorsal spine.  Bony Pelvis: The visualized bony structures of the pelvis appear unremarkable.                                      No results found in the last 24 hours.     Current Medications:     Infusions:   lactated ringers   Intravenous Continuous 125 mL/hr at 09/18/24 0603 Rate Verify at 09/18/24 0603         Scheduled:   folic acid  1 mg Oral Daily    HYDROmorphone  0.5 mg Intravenous Once    metoclopramide  10 mg Intravenous Q6H    mupirocin   Nasal BID    pantoprazole  40 mg Intravenous Daily    piperacillin-tazobactam (Zosyn) IV (PEDS and ADULTS) (extended infusion is not appropriate)  4.5 g Intravenous Q8H    rivaroxaban  15 mg Oral Daily with dinner    vancomycin (VANCOCIN) IV (PEDS and ADULTS)  1,000 mg Intravenous Q12H         PRN:   folic acid tablet 1 mg    HYDROmorphone injection 0.5 mg    metoclopramide injection 10 mg    mupirocin 2 % ointment    pantoprazole injection 40 mg    piperacillin-tazobactam (ZOSYN) 4.5 g in D5W 100 mL IVPB (MB+)    rivaroxaban tablet 15 mg    vancomycin (VANCOCIN) 1,000 mg in D5W 250 mL IVPB (admixture device)        Antibiotics and Day Number of Therapy:  Antibiotics (From admission, onward)      Start     Stop Route Frequency Ordered    09/17/24 1730  vancomycin (VANCOCIN) 1,000 mg in D5W 250 mL IVPB (admixture device)         -- IV Every 12 hours (non-standard times) 09/17/24 0837    09/17/24 0915  mupirocin 2 % ointment         09/22/24 0859 Nasl 2 times daily 09/17/24 0802    09/17/24 0730  piperacillin-tazobactam (ZOSYN) 4.5 g in D5W 100 mL IVPB (MB+)         -- IV Every 8 hours (non-standard times) 09/17/24 0235    09/17/24 0333  vancomycin - pharmacy to dose  (vancomycin IVPB (PEDS and ADULTS))        Placed in "And" Linked Group    -- IV pharmacy to manage frequency 09/17/24 0234                 Assessment & Plan:     Sepsis of unknown source  Lactic acidosis  Starvation " ketosis?  Esophagitis  Leukocytosis  - Met SIRS criteria 3/4 on admission - WBC: 18  HR: 134  RR: 23  T: 97.8  - Initial Lactic acid 21, currently normalized  - CT CAP  with and without IV contrast done at the ED.  Results showed esophagitis and gastritis.    - CXR minimal blunting of the left costophrenic angle may indicate small left-sided pleural effusion  - EKG reveals sinus tachycardia  - U/A unremarkable   - Received resuscitation fluids at 30 mL/kg within the first 3 hours at the ED  -   - Broad spectrum coverage with Vanc/Zosyn  - Blood cx x2 pending  - Strict intake and output  - Telemetry  - Supplemental oxygen prn            AGMA  Suspected poisoning/overdose versus progressing hepatic disease  - Initial AG of 40, bicarb 6, chloride 99  - Lactic acid levels as stated above, currently normalized  - ABG revealed PH of 7.26, PCO2 - 19, PO2 - 143  - received 3 amps of bicarb  - Consider Nephrology consult if no improvement        Abdominal pain  Intractable nausea & vomiting  Hx of gastroparesis  - Abdominal pain improved after receiving morphine in the ED  - IV Zofran 4 mg as needed for nausea   - consulted GI, appreciate recommendations.  - plans for EGD followed by gastric emptying scan (no history of previous scan for diagnosis of gastroparesis)  - started Carafate          ANNEL  Left nephrectomy for kidney donation in 2006  - Cr: 1.66 on arrival  Baseline: 0.8-0.9  - Continue maintenance fluids  - UA unremarkable, Utox positive for opioids and cannabinoids, opioids administered inpatient  - Avoid nephrotoxic agents  - Will continue to monitor with morning labs        Hx of pulmonary embolus and DVT in 2022  - Initially had an IVC filter that was removed in 3/2024.   - Upon chart review, patient is on Xarelto        Hypertension  - Holding home Hydralazine due to inability to tolerate p.o. Defer to primary team on restarting home p.o. meds.  - PRN IV antihypertensives          Access:  PIV  Antibiotics: Vanc/Zosyn  Diet: NPO  DVT Prophylaxis: Xarelto  GI Prophylaxis: Protonix  Fluids:          Disposition: Replete fluids and electrolytes. Consulted GI for EGD, plans to follow up with gastric emptying scan.  We will continue to monitor.        Yash Sweeney DO  [facility], PGY-I          Attending addendum to follow.

## 2024-09-18 NOTE — NURSING
Pt requested nurse update  Raphael regarding transfer to room 411.  stated he would visit tomorrow morning, pt notified.

## 2024-09-18 NOTE — PLAN OF CARE
Problem: Infection  Goal: Absence of Infection Signs and Symptoms  9/18/2024 1805 by Stacey Henderson RN  Outcome: Progressing  9/18/2024 1805 by Stacey Henderson RN  Outcome: Progressing     Problem: Adult Inpatient Plan of Care  Goal: Plan of Care Review  9/18/2024 1805 by Stacey Henderson RN  Outcome: Progressing  9/18/2024 1805 by Stacey Henderson RN  Outcome: Progressing  Goal: Patient-Specific Goal (Individualized)  9/18/2024 1805 by Stacey Henderson RN  Outcome: Progressing  9/18/2024 1805 by Stacey Henderson RN  Outcome: Progressing  Goal: Absence of Hospital-Acquired Illness or Injury  9/18/2024 1805 by Stacey Henderson RN  Outcome: Progressing  9/18/2024 1805 by Stacey Henderson RN  Outcome: Progressing  Goal: Optimal Comfort and Wellbeing  9/18/2024 1805 by Stacey Henderson RN  Outcome: Progressing  9/18/2024 1805 by Stacey Henderson RN  Outcome: Progressing  Goal: Readiness for Transition of Care  9/18/2024 1805 by Stacey Henderson RN  Outcome: Progressing  9/18/2024 1805 by Stacey Henderson RN  Outcome: Progressing     Problem: Sepsis/Septic Shock  Goal: Optimal Coping  9/18/2024 1805 by Stacey Henderson RN  Outcome: Progressing  9/18/2024 1805 by Stacey Henderson RN  Outcome: Progressing  Goal: Absence of Bleeding  9/18/2024 1805 by Stacey Henderson RN  Outcome: Progressing  9/18/2024 1805 by Stacey Henderson RN  Outcome: Progressing  Goal: Blood Glucose Level Within Targeted Range  9/18/2024 1805 by Stacey Henderson RN  Outcome: Progressing  9/18/2024 1805 by Stacey Henderson RN  Outcome: Progressing  Goal: Absence of Infection Signs and Symptoms  9/18/2024 1805 by Stacey Henderson RN  Outcome: Progressing  9/18/2024 1805 by Stacey Henderson RN  Outcome: Progressing  Goal: Optimal Nutrition Intake  9/18/2024 1805 by Henderson, Satcey, RN  Outcome: Progressing  9/18/2024 1805 by Stacey Henderson, RN  Outcome: Progressing

## 2024-09-18 NOTE — MEDICAL/APP STUDENT
[facility] Internal Medicine  PROGRESS NOTE    Patient's Name: Nahed Harvey  : 1975  MRN: [id number]    Admission Date: 2024  Length of Stay: 1  Attending Physician: Hunter Javed MD  Resident: ***  Intern: ***    SUBJECTIVE     Chief Complaint   Patient presents with    Abdominal Pain     Began vomiting last night around 0000, worsened this evening and also began having lower abdominal pain that she states has since migrated upwards towards chest. 4mg zofran per EMS. Hx gastroparesis    Nausea    Vomiting     History of Present Illness:  48 y.o. female with a past medical history of Gastroparesis, Hypertension, pulmonary embolism in , left-sided nephrectomy for donation in  presents to ED with primary complaint of abdominal pain and worsening nausea& bilious vomiting 3 days prior. Patient states the pain was initially sharp but is now a dull pain.  Onset was gradual and has worsened since onset.  She states the pain initially started in her right upper abdomen, then it radiated to her right lower abdomen and now to her right chest.   Patient reports a chronic history of nausea with a hx of gastroparesis but states that nausea has worsened within the last day.  She reports multiple bouts of emesis, occurring every 20 minutes.   Patient reports that she has had similar symptoms once within the past year that resolved over a shorter period of time but is unable to remember what happened at those times due to current fatigue.  She denies any blood in emesis, fever, chills, changes in bowel movement, chest pain, dysuria, increased urinary frequency or urgency, vaginal discharge, cough, sore throat, nasal congestion.   She does report difficulty catching her breath.  She states that she has a history of panic attacks that have been occurring since she was diagnosed with pulmonary embolism in . She is not on any medications for this.     Upon initial presentation  to the ED, patient was afebrile, tachycardic to 134, /88, RR 18, O2 sats 98% on room air.  CBC revealed leukocytosis with WBC of 18.  CMP revealed mild hyperkalemia with potassium of 5.3, AGMA with bicarb 6, chloride 98, ANNEL with creatinine of 1.66, elevated transaminases with ALT/AST of 103/163.  Initial lactic acid was elevated at 21.  CT AP without contrast revealed possible esophagitis and right-sided pyelonephritis.  CT CAP with IV contrast revealed same as above along with gastritis.  Patient received 3.5 L of LR.  Lactic acid was repeated and it was 19.  Internal medicine was consulted for further workup and management of lactic acidosis, esophagitis and pyelonephritis.  Hospital Course/Significant Events:  9/16: Admitted to [facility] for worsening abdominal pain and nausea associated with bilious vomiting 3 days prior.    Interval History:  NAEON. Kept waking up intermittently over-night. Endorses continued R abdominal pain, reduced from yesterday. Still unable to tolerate PO intake, consuming ice chips. Mild nausea, no vomiting since yesterday morning. Output of dark brown urine. No BM since admission, +flatus yesterday. Vitals overnight slightly hypertensive. CMP and CBC stable. Hypophosphatemia (1.1)    and elevated AST/ALT at 150/79, not improved from yesterday (144/91).    Review of Systems:  A 12-pt ROS was conducted and was negative unless stated above.    OBJECTIVE     VITAL SIGNS: 24 HR MIN & MAX Most Recent Vitals   Temp  Min: 98.2 °F (36.8 °C)  Max: 98.8 °F (37.1 °C)  98.2 °F (36.8 °C)   BP  Min: 107/84  Max: 162/117  (!) 152/103    Pulse  Min: 80  Max: 108  80   Resp  Min: 11  Max: 18  18    SpO2  Min: 98 %  Max: 100 %  98 %    Body mass index is 28.32 kg/m².    Intake/Output:  I/O last 3 completed shifts:  In: 4154.8 [P.O.:30; I.V.:3298.8; IV Piggyback:825.9]  Out: 950 [Urine:950]  Physical Exam  Constitutional:       Appearance: Normal appearance.   HENT:      Head: Normocephalic and  atraumatic.      Right Ear: External ear normal.      Left Ear: External ear normal.      Nose: Nose normal. No congestion or rhinorrhea.      Mouth/Throat:      Mouth: Mucous membranes are dry.      Pharynx: Oropharynx is clear. No oropharyngeal exudate or posterior oropharyngeal erythema.   Eyes:      General:         Right eye: No discharge.         Left eye: No discharge.      Conjunctiva/sclera: Conjunctivae normal.      Pupils: Pupils are equal, round, and reactive to light.   Cardiovascular:      Rate and Rhythm: Normal rate and regular rhythm.      Heart sounds: No murmur heard.     No gallop.   Pulmonary:      Effort: Pulmonary effort is normal. No respiratory distress.      Breath sounds: Normal breath sounds. No stridor. No wheezing.   Abdominal:      General: Bowel sounds are normal. There is distension.      Palpations: Abdomen is soft.      Tenderness: There is abdominal tenderness (RUQ&RLQ tenderness). There is no guarding or rebound.   Musculoskeletal:         General: No swelling or tenderness. Normal range of motion.      Cervical back: Normal range of motion and neck supple.      Right lower leg: No edema.      Left lower leg: No edema.   Skin:     General: Skin is warm and dry.   Neurological:      General: No focal deficit present.      Mental Status: She is alert and oriented to person, place, and time.   Psychiatric:         Mood and Affect: Mood normal.         Thought Content: Thought content normal.         Judgment: Judgment normal.         Current Medications:  Scheduled:   folic acid  1 mg Oral Daily    HYDROmorphone  0.5 mg Intravenous Once    metoclopramide  10 mg Intravenous Q6H    mupirocin   Nasal BID    nicotine  1 patch Transdermal Daily    pantoprazole  40 mg Intravenous Daily    piperacillin-tazobactam (Zosyn) IV (PEDS and ADULTS) (extended infusion is not appropriate)  4.5 g Intravenous Q8H    rivaroxaban  15 mg Oral Daily with dinner    vancomycin (VANCOCIN) IV (PEDS and  ADULTS)  1,000 mg Intravenous Q12H      Infusions:   lactated ringers   Intravenous Continuous 125 mL/hr at 09/18/24 0901 New Bag at 09/18/24 0901     PRNs:    Current Facility-Administered Medications:     hydrALAZINE, 10 mg, Intravenous, Q6H PRN    melatonin, 6 mg, Oral, Nightly PRN    prochlorperazine, 2.5 mg, Intravenous, Q6H PRN    sodium chloride 0.9%, 10 mL, Intravenous, PRN    Pharmacy to dose Vancomycin consult, , , Once **AND** vancomycin - pharmacy to dose, , Intravenous, pharmacy to manage frequency  Labs:  CBC:  Recent Labs   Lab 09/16/24 2234 09/17/24  0405 09/18/24  0330   WBC 18.16* 11.86* 7.38   HGB 13.4 12.8 10.7*   HCT 41.5 36.8* 31.7*    102* 76*   .5* 107.9* 101.9*   RDW 14.1 13.9 14.1     BMP/CMP:  Recent Labs   Lab 09/17/24 0200 09/17/24  0715 09/18/24  0330    139 139   K 5.4* 4.6 3.5   CL 99 99 102   CO2 <5* 11* 26   BUN 13.3 13.1 15.3   CREATININE 1.44* 1.58* 1.43*   GLUCOSE 64* 181* 112*   EGFRNORACEVR 44 40 45     RFTs/LFTs:  Recent Labs   Lab 09/16/24 2234 09/17/24 0200 09/17/24  0236 09/17/24  0715 09/18/24  0330 09/18/24  0657   CALCIUM 9.2 8.6  --  8.4 8.0*  --    LABPROT 7.5 6.7 19.8*  --  5.5* 17.5*   ALBUMIN 3.8 3.3*  --   --  2.8*  --    * 144*  --   --  150*  --    * 91*  --   --  79*  --    ALKPHOS 120 105  --   --  69  --    BILITOT 0.5 0.7  --   --  0.7  --      Recent Labs   Lab 09/16/24 2234 09/17/24 0200 09/18/24  0330   MG 1.80 1.50* 1.90   PHOS 9.0* 7.2* 1.1*     Cardiac Panel:  Recent Labs   Lab 09/17/24  0405 09/17/24  1009 09/17/24  1546 09/17/24  2202   TROPONINI 0.143* 0.296* 0.216* 0.109*     Interval Imaging:  CT Abdomen Pelvis  Without Contrast  Narrative: EXAMINATION:  CT ABDOMEN PELVIS WITHOUT CONTRAST    CLINICAL HISTORY:  Abdominal abscess/infection suspected;    TECHNIQUE:  Helically acquired axial images, sagittal and coronal reformations were obtained from the lung bases to the pubic symphysis without the IV  administration of contrast.    Automated tube current modulation, weight-based exposure dosing, and/or iterative reconstruction technique utilized to reach lowest reasonably achievable exposure rate.    DLP: 487 mGy*cm    COMPARISON:  CT chest abdomen pelvis 09/13/2022    FINDINGS:  HEART: Normal in size. No pericardial effusion.    LUNG BASES: Well aerated.    LIVER: Severe hepatic steatosis.  Defer to prior contrast enhanced exam regarding probable hemangiomata and cysts.    BILIARY: Gallbladder is not overly distended.    PANCREAS: No inflammatory change.    SPLEEN: Normal in size    ADRENALS: No mass.    KIDNEYS/URETERS: Postop left nephrectomy.  No right hydronephrosis.  Nonspecific perinephric stranding.    GI TRACT/MESENTERY: Evaluation of the bowel is limited without contrast.  Edema at the lower esophagus.  No evidence of bowel obstruction.    PERITONEUM: No free fluid.No free air.    LYMPH NODES: No enlarged lymph nodes by size criteria.    VASCULATURE: Aortoiliac atherosclerosis.    BLADDER: Normal appearance given degree of distention.    REPRODUCTIVE ORGANS: Decreased size probable subserosal uterine fibroid.    ABDOMINAL WALL: Unremarkable.    BONES: No acute osseous abnormality.  Impression: 1. Edematous appearance of the lower esophagus  2. Severe hepatic steatosis  3. The preliminary and final reports are concordant.    Electronically signed by: Amairani Reyes  Date:    09/17/2024  Time:    11:03  CT Chest Abdomen Pelvis With IV Contrast (XPD) Routine Oral Contrast  Narrative: EXAMINATION:  CT CHEST ABDOMEN PELVIS WITH IV CONTRAST (XPD)    CLINICAL HISTORY:  Aortic dissection suspected;    TECHNIQUE:  Helical acquisition with axial, sagittal and coronal reformations obtained from the thoracic inlet to the pubic symphysis following the IV administration of contrast.    Automated tube current modulation, weight-based exposure dosing, and/or iterative reconstruction technique utilized to reach lowest  reasonably achievable exposure rate.    DLP: 382 mGy*cm    COMPARISON:  CT abdomen pelvis 09/16/2024, CT chest 12/09/2023, CT chest abdomen pelvis 09/13/2022    FINDINGS:  BASE OF NECK: No significant abnormality.    HEART: Normal size. No effusion.    THORACIC VASCULATURE: No aortic aneurysm and no significant atherosclerosis.Pulmonary arteries enhance normally.    AGUSTÍN/MEDIASTINUM: The wall of the esophagus appears thickened and edematous with periesophageal fat stranding.    AIRWAYS: Patent.    LUNGS/PLEURA: No lobar consolidation, pleural effusion or pneumothorax.  Very mild ground-glass density at the lung bases may be related to developing atelectasis or mild pneumonitis.    THORACIC SOFT TISSUES: Unremarkable.    LIVER: Severe hepatic steatosis.  Probable small hepatic cysts and hemangioma, similar to prior exam.    BILIARY: No calcified gallstones.    PANCREAS: No inflammatory change.    SPLEEN: Normal in size    ADRENALS: No mass.    KIDNEYS/URETERS: No right hydronephrosis.  Left kidney surgically absent.  No hydronephrosis.    GI TRACT/MESENTERY:  No evidence of bowel obstruction.  Colonic diverticulosis without acute inflammatory change.    PERITONEUM: No free fluid.No free air.    LYMPH NODES: No enlarged lymph nodes by size criteria.    ABDOMINOPELVIC VASCULATURE: Mild aortic atherosclerosis.    BLADDER: Normal appearance given degree of distention.    REPRODUCTIVE ORGANS: Chronic right adnexal lesion, decreased in size compared to prior exam in 2022.  Lesion currently measures 6 cm; previously 10 cm.  Suspect subserosal fibroid.    ABDOMINAL WALL: Unremarkable.    BONES: No acute osseous abnormality.  Impression: 1. Changes suggestive of esophagitis.  2. The preliminary and final reports are concordant.    Electronically signed by: Amairani Reyes  Date:    09/17/2024  Time:    10:50  X-Ray Chest 1 View  Narrative: EXAMINATION:  XR CHEST 1 VIEW    CPT 96695    CLINICAL HISTORY:  shortness of  "breath;    COMPARISON:  August 22, 2024    FINDINGS:  Examination reveals mediastinal silhouette to be within normal limits cardiac silhouette is not enlarged there is some blunting of the left costophrenic angle which may indicate the presence of left-sided pleural effusion.    No focal consolidative changes atelectasis or other effusions identified  Impression: Minimal blunting of the left costophrenic angle might indicate a small left-sided pleural effusion.    Otherwise no active pulmonary disease    Electronically signed by: Yaron Ventura  Date:    09/17/2024  Time:    07:47     Microbiology:  Microbiology Results (last 7 days)       Procedure Component Value Units Date/Time    Blood Culture #2 **CANNOT BE ORDERED STAT** [[id number]] Collected: 09/16/24 2251    Order Status: Resulted Specimen: Blood from Hand, Right Updated: 09/17/24 0447    Blood Culture #1 **CANNOT BE ORDERED STAT** [[id number]] Collected: 09/16/24 2251    Order Status: Resulted Specimen: Blood from Arm, Left Updated: 09/17/24 0446          Antibiotics:  Antibiotics (From admission, onward)      Start     Stop Route Frequency Ordered    09/17/24 1730  vancomycin (VANCOCIN) 1,000 mg in D5W 250 mL IVPB (admixture device)         -- IV Every 12 hours (non-standard times) 09/17/24 0837    09/17/24 0915  mupirocin 2 % ointment         09/22/24 0859 Nasl 2 times daily 09/17/24 0802    09/17/24 0730  piperacillin-tazobactam (ZOSYN) 4.5 g in D5W 100 mL IVPB (MB+)         -- IV Every 8 hours (non-standard times) 09/17/24 0235    09/17/24 0333  vancomycin - pharmacy to dose  (vancomycin IVPB (PEDS and ADULTS))        Placed in "And" Linked Group    -- IV pharmacy to manage frequency 09/17/24 0234             ASSESSMENT AND PLAN   Sepsis of unknown source  Lactic acidosis  ?Right pyelonephritis  ?Esophagitis  Leukocytosis  - Met SIRS criteria 3/4 on admission - WBC: 18  HR: 134  RR: 23  T: 97.8  - Initial Lactic acid 21, repeat in the ED 2 hrs " later was 19. Trending lactic acid q4  - CT CAP  with and without IV contrast done at the ED.  Results showed findings suggestive of right pyelonephritis, esophagitis and gastritis.    - CXR ordered and pending  - EKG reveals sinus tachycardia  - U/A ordered but not collected as patient is yet to void.   - Received resuscitation fluids at 30 mL/kg within the first 3 hours at the ED  - D5 LR @ 125 cc/hr  - Broad spectrum coverage with Vanc/Zosyn  - Blood cx x2, Coags, D-dimer, Troponin, TSH, UA, UPT pending  - Strict intake and output  - Telemetry  - Supplemental oxygen prn        AGMA  - Initial AG of 40, bicarb 6, chloride 99  - Lactic acid levels as stated above  - ABG revealed PH of 7.26, PCO2 - 19, PO2 - 143  - Will give 3 amps of bicarb  - Repeat BMP ordered for 7am  - Consider Nephrology consult if no improvement        Abdominal pain  Intractable nausea & vomiting  Hx of gastroparesis  - Abdominal pain improved after receiving morphine in the ED  - IV Zofran 4 mg as needed for nausea   -CT and XR      ANNEL  Left nephrectomy for kidney donation in 2006  - Cr: 1.66 on arrival  Baseline: 0.8-0.9  - Continue maintenance fluids  - UA, Utox pending  - Avoid nephrotoxic agents  - Will continue to monitor with morning labs        Hx of pulmonary embolus and DVT in 2022  - Initially had an IVC filter that was removed in 3/2024.   - Upon chart review, patient is on Xarelto        Hypertension  - Holding home Hydralazine due to inability to tolerate p.o. Defer to primary team on restarting home p.o. meds.  - PRN IV antihypertensives          Access: PIV  Antibiotics: Vanc/Zosyn  Diet: NPO  DVT Prophylaxis: Xarelto  GI Prophylaxis: Protonix  Fluids: D5 LR at 125cc/hr      DVT Prophylaxis: ***  GI Prophylaxis: ***  Abx: ***  Access: ***  Fluids: ***  Diet: Diet NPO    Code Status: Full Code    Disposition: ***       Case was discussed with Dr. Javed. Please appreciate attending's attestation to follow.      [facility] Internal  Medicine Team 1  09/18/2024

## 2024-09-18 NOTE — CONSULTS
Gastroenterology/Hepatology Initial Consult Note    Patient Name: Nahed Harvey  Age: 48 y.o.  : 1975  MRN: [id number]  Admission Date: 2024    Reason for Consult:      Medical Management  Chief Complaint   Patient presents with    Abdominal Pain     Began vomiting last night around 0000, worsened this evening and also began having lower abdominal pain that she states has since migrated upwards towards chest. 4mg zofran per EMS. Hx gastroparesis    Nausea    Vomiting         Self, Aaareferral    HPI:     Nahed Harvey is a 48 y.o. 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     Symptoms started 3 days ago with onset of nausea followed by intractable nonbloody, bilious vomiting for the next 24 hours.  She denies any associated diarrhea or fevers.  She reported having mid right side pain that radiated down then into the RUQ region.  She had some associated constipation and weakness.   She denies any identifiable food trigger or recent sick contacts.     Labs on admission significant for significant metabolic acidosis with lactic acid 20 now improved to normal with IVF resuscitation.  Electrolytes with mild hyperkalemia and acidemia improved.  Never hypotensive. CBD with downtrending leukocytosis, normal hemoglobin with macrocytosis, mild thrombocytopenia.  INR 1.7 now downtrending.  Mild transaminitis.  Normal bilirubin.  Blood cultures negative so far.   UDS positive for cannabinoids, opiates  CT a/p on admission showing esophageal wall thickening with possible esophagitis, hepatic steatosis.    She was started on empiric abx, IVFs, PPI.     At baseline, she describes a many year history of intermittent episodes of nausea followed by bilious emesis.  Symptoms occur on average once a week.  Her symptoms will improve following emesis or after taking a cold shower.  She has  ondansetron and promethazine which she uses prn but if these do not help, she will take two marijuana gummies for symptom relief.  She has not been able to identify any triggers.  She does not have associated abdominal pain or diarrhea with these episodes.  In between these episodes she does not have nausea or vomiting issues.      She is not sure how diagnosis of gastroparesis was made. She denies having an emptying study performed.  She was recently established with Dr. Villarreal who performed an EGD 1 month ago but she does not know the findings.  She was recently placed on pepcid for nonspecific abdominal pain symptom.     On average she has 2-3 soft stools a day since she takes magnesium prescribed by her Nephrologist.  Her last bowel movement prior to admission was 4 days ago.  She had a soft bowel movement today. She denies any rectal bleeding or melena.  She has never had a colonoscopy.    Presently she is feeling much better.  She has some mild residual nausea.      She smokes 3 cigarettes a day.  She drinks vodka mixed drink 1-2 times a week.  She goes through a 1/2 pint or 1 pint of vodka every 2 weeks.  She uses marijuana gummies as abortive therapy for her nausea and vomiting on average once a week.     She denies any FH of GI cancers, colon polyps or colon cancer.         Brandon Harrison MD    Past Medical History:   Diagnosis Date    Gastroparesis     Hypertension         Past Surgical History:   Procedure Laterality Date    INSERTION OF INFERIOR VENA CAVAL FILTER N/A 9/16/2022    Procedure: Insertion, Filter, Inferior Vena Cava;  Surgeon: Juancarlos Victoria MD;  Location: [facility] CATH LAB;  Service: Cardiology;  Laterality: N/A;    IVC FILTER RETRIEVAL N/A 2/7/2024    Procedure: REMOVAL, FILTER, INFERIOR VENA CAVA;  Surgeon: Juancarlos Victoria MD;  Location: [facility] CATH LAB;  Service: Cardiology;  Laterality: N/A;  IVC FILTER REMOVAL    NEPHRECTOMY      PERCUTANEOUS MECHANICAL THROMBECTOMY OF VASCULAR GRAFT OF  UPPER EXTREMITY  9/14/2022    Procedure: THROMBECTOMY, MECHANICAL, VASCULAR GRAFT, UPPER EXTREMITY, PERCUTANEOUS;  Surgeon: Juancarlos Victoria MD;  Location: [facility] CATH LAB;  Service: Cardiology;;    PHLEBOGRAPHY  9/16/2022    Procedure: VENOGRAM;  Surgeon: Juancarlos Victoria MD;  Location: [facility] CATH LAB;  Service: Cardiology;;    THROMBECTOMY  9/14/2022    Procedure: THROMBECTOMY;  Surgeon: Juancarlos Victoria MD;  Location: [facility] CATH LAB;  Service: Cardiology;;        No family history on file.    Social History     Tobacco Use    Smoking status: Some Days     Types: Cigarettes    Smokeless tobacco: Not on file   Substance Use Topics    Alcohol use: Yes     Comment: RARELY         Review of patient's allergies indicates:  No Known Allergies     Medications Prior to Admission   Medication Sig Dispense Refill Last Dose    ALAHIST D 10-17.5 mg Tab Take 1 tablet by mouth every 4 (four) hours.       allopurinoL (ZYLOPRIM) 100 MG tablet Take 100 mg by mouth once daily.       buPROPion (WELLBUTRIN XL) 300 MG 24 hr tablet Take 300 mg by mouth once daily.       cetirizine (ZYRTEC) 5 MG tablet Take 5 mg by mouth once daily.       ciprofloxacin-dexAMETHasone 0.3-0.1% (CIPRODEX) 0.3-0.1 % DrpS Place 4 drops into both ears 2 (two) times daily.       cyanocobalamin (VITAMIN B-12) 1000 MCG tablet Take 100 mcg by mouth once daily.       diphenhydrAMINE (BENADRYL) 50 MG capsule Take 50 mg by mouth nightly as needed for Insomnia.       famotidine (PEPCID) 20 MG tablet Take 20 mg by mouth daily as needed for Heartburn.       fluticasone propionate (FLONASE) 50 mcg/actuation nasal spray 1 spray by Each Nostril route Daily.       furosemide (LASIX) 20 MG tablet TAKE 1 TABLET BY MOUTH EVERY DAY 30 tablet 0     hydrALAZINE (APRESOLINE) 25 MG tablet Take 1 tablet (25 mg total) by mouth every 8 (eight) hours. 90 tablet 2     MAGNESIUM ORAL Take 400 mg by mouth Daily.       metoprolol tartrate (LOPRESSOR) 50 MG tablet Take 50 mg by mouth 2 (two) times daily.   "     omeprazole (PRILOSEC) 20 MG capsule Take 20 mg by mouth once daily.       ondansetron (ZOFRAN-ODT) 4 MG TbDL Take 1 tablet (4 mg total) by mouth every 8 (eight) hours as needed (nausea/vomiting). 20 tablet 0     progesterone (PROMETRIUM) 200 MG capsule Take 200 mg by mouth every evening.       promethazine (PHENERGAN) 12.5 MG Tab Take 12.5 mg by mouth every 6 (six) hours as needed.       rivaroxaban (XARELTO) 10 mg Tab Take 2 tablets (20 mg total) by mouth daily with dinner or evening meal. 60 tablet 2     valACYclovir (VALTREX) 1000 MG tablet Take 1,000 mg by mouth daily as needed ("FEVER BLISTER").            INPATIENT MEDICATIONS    Scheduled Meds:   folic acid  1 mg Oral Daily    HYDROmorphone  0.5 mg Intravenous Once    metoclopramide  10 mg Intravenous Q6H    mupirocin   Nasal BID    nicotine  1 patch Transdermal Daily    pantoprazole  40 mg Intravenous Daily    piperacillin-tazobactam (Zosyn) IV (PEDS and ADULTS) (extended infusion is not appropriate)  4.5 g Intravenous Q8H    rivaroxaban  15 mg Oral Daily with dinner    sucralfate  1 g Oral Q6H    vancomycin (VANCOCIN) IV (PEDS and ADULTS)  1,000 mg Intravenous Q12H     Continuous Infusions:   lactated ringers   Intravenous Continuous 125 mL/hr at 09/18/24 0901 New Bag at 09/18/24 0901     PRN Meds:    Current Facility-Administered Medications:     hydrALAZINE, 10 mg, Intravenous, Q6H PRN    melatonin, 6 mg, Oral, Nightly PRN    prochlorperazine, 2.5 mg, Intravenous, Q6H PRN    sodium chloride 0.9%, 10 mL, Intravenous, PRN    Pharmacy to dose Vancomycin consult, , , Once **AND** vancomycin - pharmacy to dose, , Intravenous, pharmacy to manage frequency          Review of Systems:       Review of Systems   Constitutional:  Negative for appetite change and unexpected weight change.   HENT:  Negative for trouble swallowing.    Respiratory:  Negative for chest tightness.    Cardiovascular: Negative.    Gastrointestinal:  Positive for constipation, nausea " and vomiting. Negative for change in bowel habit.   Musculoskeletal: Negative.    Neurological: Negative.    Psychiatric/Behavioral: Negative.     All other systems reviewed and are negative.         Objective:       VITAL SIGNS: 24 HR MIN & MAX LAST    Temp  Min: 98.2 °F (36.8 °C)  Max: 98.7 °F (37.1 °C)  98.5 °F (36.9 °C)        BP  Min: 107/84  Max: 162/117  (!) 138/92     Pulse  Min: 76  Max: 95  78     Resp  Min: 16  Max: 18  18    SpO2  Min: 98 %  Max: 100 %  99 %        Physical Exam  Constitutional:       Appearance: Normal appearance. She is obese.   HENT:      Head: Normocephalic and atraumatic.      Mouth/Throat:      Mouth: Mucous membranes are moist.   Eyes:      Conjunctiva/sclera: Conjunctivae normal.   Cardiovascular:      Rate and Rhythm: Normal rate and regular rhythm.   Pulmonary:      Effort: Pulmonary effort is normal.      Breath sounds: Normal breath sounds.   Abdominal:      General: Bowel sounds are normal.      Palpations: Abdomen is soft.   Musculoskeletal:         General: Normal range of motion.      Cervical back: Normal range of motion.   Skin:     General: Skin is warm.   Neurological:      General: No focal deficit present.      Mental Status: She is alert and oriented to person, place, and time. Mental status is at baseline.   Psychiatric:         Mood and Affect: Mood normal.         Behavior: Behavior normal.              LABS:      Recent Labs   Lab 09/16/24 2234 09/17/24 0405 09/18/24  0330   WBC 18.16* 11.86* 7.38   HGB 13.4 12.8 10.7*   HCT 41.5 36.8* 31.7*    102* 76*   .5* 107.9* 101.9*       Recent Labs   Lab 09/16/24 2234 09/17/24  0405 09/18/24  0330   HGB 13.4 12.8 10.7*   HCT 41.5 36.8* 31.7*        Recent Labs   Lab 09/16/24 2234 09/17/24  0200 09/17/24  0236 09/17/24  0715 09/18/24  0330 09/18/24  0657    138  --  139 139  --    K 5.3* 5.4*  --  4.6 3.5  --    CO2 6* <5*  --  11* 26  --    BUN 14.8 13.3  --  13.1 15.3  --    CREATININE 1.66*  "1.44*  --  1.58* 1.43*  --    BILITOT 0.5 0.7  --   --  0.7  --    ALKPHOS 120 105  --   --  69  --    * 144*  --   --  150*  --    * 91*  --   --  79*  --    LABPROT 7.5 6.7   < >  --  5.5* 17.5*   ALBUMIN 3.8 3.3*  --   --  2.8*  --     < > = values in this interval not displayed.        Recent Labs   Lab 09/17/24  0236 09/18/24  0657   INR 1.7* 1.4*        No results for input(s): "IRON", "FERRITIN" in the last 168 hours.       IMAGING:   CT Abdomen Pelvis  Without Contrast    Result Date: 9/17/2024  EXAMINATION: CT ABDOMEN PELVIS WITHOUT CONTRAST CLINICAL HISTORY: Abdominal abscess/infection suspected; TECHNIQUE: Helically acquired axial images, sagittal and coronal reformations were obtained from the lung bases to the pubic symphysis without the IV administration of contrast. Automated tube current modulation, weight-based exposure dosing, and/or iterative reconstruction technique utilized to reach lowest reasonably achievable exposure rate. DLP: 487 mGy*cm COMPARISON: CT chest abdomen pelvis 09/13/2022 FINDINGS: HEART: Normal in size. No pericardial effusion. LUNG BASES: Well aerated. LIVER: Severe hepatic steatosis.  Defer to prior contrast enhanced exam regarding probable hemangiomata and cysts. BILIARY: Gallbladder is not overly distended. PANCREAS: No inflammatory change. SPLEEN: Normal in size ADRENALS: No mass. KIDNEYS/URETERS: Postop left nephrectomy.  No right hydronephrosis.  Nonspecific perinephric stranding. GI TRACT/MESENTERY: Evaluation of the bowel is limited without contrast.  Edema at the lower esophagus.  No evidence of bowel obstruction. PERITONEUM: No free fluid.No free air. LYMPH NODES: No enlarged lymph nodes by size criteria. VASCULATURE: Aortoiliac atherosclerosis. BLADDER: Normal appearance given degree of distention. REPRODUCTIVE ORGANS: Decreased size probable subserosal uterine fibroid. ABDOMINAL WALL: Unremarkable. BONES: No acute osseous abnormality.     1. Edematous " appearance of the lower esophagus 2. Severe hepatic steatosis 3. The preliminary and final reports are concordant. Electronically signed by: Amairani Reyes Date:    09/17/2024 Time:    11:03    CT Chest Abdomen Pelvis With IV Contrast (XPD) Routine Oral Contrast    Result Date: 9/17/2024  EXAMINATION: CT CHEST ABDOMEN PELVIS WITH IV CONTRAST (XPD) CLINICAL HISTORY: Aortic dissection suspected; TECHNIQUE: Helical acquisition with axial, sagittal and coronal reformations obtained from the thoracic inlet to the pubic symphysis following the IV administration of contrast. Automated tube current modulation, weight-based exposure dosing, and/or iterative reconstruction technique utilized to reach lowest reasonably achievable exposure rate. DLP: 382 mGy*cm COMPARISON: CT abdomen pelvis 09/16/2024, CT chest 12/09/2023, CT chest abdomen pelvis 09/13/2022 FINDINGS: BASE OF NECK: No significant abnormality. HEART: Normal size. No effusion. THORACIC VASCULATURE: No aortic aneurysm and no significant atherosclerosis.Pulmonary arteries enhance normally. AGUSTÍN/MEDIASTINUM: The wall of the esophagus appears thickened and edematous with periesophageal fat stranding. AIRWAYS: Patent. LUNGS/PLEURA: No lobar consolidation, pleural effusion or pneumothorax.  Very mild ground-glass density at the lung bases may be related to developing atelectasis or mild pneumonitis. THORACIC SOFT TISSUES: Unremarkable. LIVER: Severe hepatic steatosis.  Probable small hepatic cysts and hemangioma, similar to prior exam. BILIARY: No calcified gallstones. PANCREAS: No inflammatory change. SPLEEN: Normal in size ADRENALS: No mass. KIDNEYS/URETERS: No right hydronephrosis.  Left kidney surgically absent.  No hydronephrosis. GI TRACT/MESENTERY:  No evidence of bowel obstruction.  Colonic diverticulosis without acute inflammatory change. PERITONEUM: No free fluid.No free air. LYMPH NODES: No enlarged lymph nodes by size criteria. ABDOMINOPELVIC VASCULATURE:  Mild aortic atherosclerosis. BLADDER: Normal appearance given degree of distention. REPRODUCTIVE ORGANS: Chronic right adnexal lesion, decreased in size compared to prior exam in 2022.  Lesion currently measures 6 cm; previously 10 cm.  Suspect subserosal fibroid. ABDOMINAL WALL: Unremarkable. BONES: No acute osseous abnormality.     1. Changes suggestive of esophagitis. 2. The preliminary and final reports are concordant. Electronically signed by: Amairani Reyes Date:    09/17/2024 Time:    10:50    X-Ray Chest 1 View    Result Date: 9/17/2024  EXAMINATION: XR CHEST 1 VIEW CPT 53343 CLINICAL HISTORY: shortness of breath; COMPARISON: August 22, 2024 FINDINGS: Examination reveals mediastinal silhouette to be within normal limits cardiac silhouette is not enlarged there is some blunting of the left costophrenic angle which may indicate the presence of left-sided pleural effusion. No focal consolidative changes atelectasis or other effusions identified     Minimal blunting of the left costophrenic angle might indicate a small left-sided pleural effusion. Otherwise no active pulmonary disease Electronically signed by: Yaron Ventura Date:    09/17/2024 Time:    07:47    X-Ray Chest AP Portable    Result Date: 8/22/2024  EXAMINATION: XR CHEST AP PORTABLE CLINICAL HISTORY: , Cough, unspecified. FINDINGS: No alveolar consolidation, effusion, or pneumothorax is seen.   The thoracic aorta is normal  cardiac silhouette is enlarged, central pulmonary vessels and mediastinum are normal in size and are grossly unremarkable.   visualized osseous structures are grossly unremarkable.     No acute chest disease is identified. Mild cardiomegaly Electronically signed by: Yaron Ventura Date:    08/22/2024 Time:    15:37        Assessment / Plan:     Nahed Harvey is a 48 y.o. female with DM, DVT with PTE no Xarelto (IVC filter removed Feb 2024), HTN, obesity, self reported gastroparesis admitted with nausea, vomiting, ANNEL and  lactic acidosis.     Nausea and vomiting:   - Chronic nausea vomiting syndrome possible but can consider other entities such as cyclic vomiting syndrome or cannabinoid hyperemesis syndrome.  Per history, she is not an excessive marijuana user but still a possibility given its use.   - No formal evaluation for gastroparesis has ever been performed, therefore, this diagnosis cannot be confirmed at this time.  Baseline symptoms are not entirely consistent with gastroparesis symptoms.   - Current acute issues are improved  - Anti-emetics prn   - IVFs  - Depending on diagnosis, may benefit more from alternative treatment outpatients other than anti-emetics  - Gastric emptying study would be recommended as outpatient when patient back to baseline  - PPI and sucralfate in the short tem for esophagitis due vomiting seen on CT scan  - Will get EGD report from Dr. Villarreal    2. Hepatic steatosis  - Low concern for chronic liver disease at this time.  Suspect lab abnormalities more related to acute issues (platelets normal prior to admission, INR elevation likely reflective of Xarelto use).  Transaminitis may reflect some degree of steatohepatitis  - Will send PET given alcohol use (although not excessive per history)  - Outpatient follow-up for further work-up including Fibroscan   - If MASLD, depending on degree of fibrosis, may benefit from treatment            Mechelle Metzger MD, MPH  Gastroenterology and Hepatology   of Clinical Medicine  [facility] - [facility] and [facility]

## 2024-09-19 DIAGNOSIS — E83.01 WILSON'S DISEASE: ICD-10-CM

## 2024-09-19 DIAGNOSIS — R79.89 ELEVATED TESTOSTERONE LEVEL IN FEMALE: ICD-10-CM

## 2024-09-19 DIAGNOSIS — D68.61 ANTIPHOSPHOLIPID ANTIBODY SYNDROME: ICD-10-CM

## 2024-09-19 DIAGNOSIS — E87.20 LACTIC ACIDOSIS: Primary | ICD-10-CM

## 2024-09-19 LAB
(HCYS)2 SERPL-MCNC: >50 UMOL/L (ref 5.1–15.4)
ALBUMIN SERPL-MCNC: 2.6 G/DL (ref 3.5–5)
ALBUMIN/GLOB SERPL: 1 RATIO (ref 1.1–2)
ALP SERPL-CCNC: 64 UNIT/L (ref 40–150)
ALT SERPL-CCNC: 78 UNIT/L (ref 0–55)
ANION GAP SERPL CALC-SCNC: 10 MEQ/L
APTT PPP: 33.5 SECONDS (ref 23.2–33.7)
AST SERPL-CCNC: 119 UNIT/L (ref 5–34)
BASOPHILS # BLD AUTO: 0.03 X10(3)/MCL
BASOPHILS NFR BLD AUTO: 0.8 %
BILIRUB SERPL-MCNC: 0.9 MG/DL
BUN SERPL-MCNC: 8 MG/DL (ref 9.8–20.1)
C3 SERPL-MCNC: 48 MG/DL (ref 80–173)
C4 SERPL-MCNC: 13 MG/DL (ref 13–46)
CALCIUM SERPL-MCNC: 7.8 MG/DL (ref 8.4–10.2)
CHLORIDE SERPL-SCNC: 99 MMOL/L (ref 98–107)
CHOLEST SERPL-MCNC: 147 MG/DL
CHOLEST/HDLC SERPL: 2 {RATIO} (ref 0–5)
CO2 SERPL-SCNC: 28 MMOL/L (ref 22–29)
CREAT SERPL-MCNC: 1.02 MG/DL (ref 0.55–1.02)
CREAT/UREA NIT SERPL: 8
EOSINOPHIL # BLD AUTO: 0.03 X10(3)/MCL (ref 0–0.9)
EOSINOPHIL NFR BLD AUTO: 0.8 %
ERYTHROCYTE [DISTWIDTH] IN BLOOD BY AUTOMATED COUNT: 13.9 % (ref 11.5–17)
ETHYLENE GLYCOL SERPLBLD-MCNC: NOT DETECTED MG/DL
FERRITIN SERPL-MCNC: 954.42 NG/ML (ref 4.63–204)
FIBRINOGEN PPP-MCNC: 265 MG/DL (ref 210–463)
GFR SERPLBLD CREATININE-BSD FMLA CKD-EPI: >60 ML/MIN/1.73/M2
GLOBULIN SER-MCNC: 2.5 GM/DL (ref 2.4–3.5)
GLUCOSE SERPL-MCNC: 86 MG/DL (ref 74–100)
HAV IGM SERPL QL IA: NONREACTIVE
HBV CORE IGM SERPL QL IA: NONREACTIVE
HBV SURFACE AG SERPL QL IA: NONREACTIVE
HCT VFR BLD AUTO: 29.2 % (ref 37–47)
HCV AB SERPL QL IA: NONREACTIVE
HDLC SERPL-MCNC: 63 MG/DL (ref 35–60)
HGB BLD-MCNC: 10.4 G/DL (ref 12–16)
HIV 1+2 AB+HIV1 P24 AG SERPL QL IA: NONREACTIVE
IMM GRANULOCYTES # BLD AUTO: 0.01 X10(3)/MCL (ref 0–0.04)
IMM GRANULOCYTES NFR BLD AUTO: 0.3 %
INR PPP: 1.4
LDLC SERPL CALC-MCNC: 72 MG/DL (ref 50–140)
LYMPHOCYTES # BLD AUTO: 1.25 X10(3)/MCL (ref 0.6–4.6)
LYMPHOCYTES NFR BLD AUTO: 35.2 %
MAGNESIUM SERPL-MCNC: 1.6 MG/DL (ref 1.6–2.6)
MCH RBC QN AUTO: 37.1 PG (ref 27–31)
MCHC RBC AUTO-ENTMCNC: 35.6 G/DL (ref 33–36)
MCV RBC AUTO: 104.3 FL (ref 80–94)
MONOCYTES # BLD AUTO: 0.19 X10(3)/MCL (ref 0.1–1.3)
MONOCYTES NFR BLD AUTO: 5.4 %
NEUTROPHILS # BLD AUTO: 2.04 X10(3)/MCL (ref 2.1–9.2)
NEUTROPHILS NFR BLD AUTO: 57.5 %
NRBC BLD AUTO-RTO: 0 %
PHOSPHATE SERPL-MCNC: 2.3 MG/DL (ref 2.3–4.7)
PLATELET # BLD AUTO: 58 X10(3)/MCL (ref 130–400)
PLATELET # BLD EST: ABNORMAL 10*3/UL
PLATELETS.RETICULATED NFR BLD AUTO: 8.5 % (ref 0.9–11.2)
PMV BLD AUTO: 12 FL (ref 7.4–10.4)
POCT GLUCOSE: 97 MG/DL (ref 70–110)
POTASSIUM SERPL-SCNC: 3 MMOL/L (ref 3.5–5.1)
PROT SERPL-MCNC: 5.1 GM/DL (ref 6.4–8.3)
PROTHROMBIN TIME: 16.9 SECONDS (ref 11.4–14)
RBC # BLD AUTO: 2.8 X10(6)/MCL (ref 4.2–5.4)
RBC MORPH BLD: NORMAL
SODIUM SERPL-SCNC: 137 MMOL/L (ref 136–145)
T PALLIDUM AB SER QL: NONREACTIVE
TRIGL SERPL-MCNC: 62 MG/DL (ref 37–140)
VANCOMYCIN TROUGH SERPL-MCNC: 11.6 UG/ML (ref 15–20)
VLDLC SERPL CALC-MCNC: 12 MG/DL
WBC # BLD AUTO: 3.55 X10(3)/MCL (ref 4.5–11.5)

## 2024-09-19 PROCEDURE — 87389 HIV-1 AG W/HIV-1&-2 AB AG IA: CPT

## 2024-09-19 PROCEDURE — 63600175 PHARM REV CODE 636 W HCPCS

## 2024-09-19 PROCEDURE — 36415 COLL VENOUS BLD VENIPUNCTURE: CPT

## 2024-09-19 PROCEDURE — 85730 THROMBOPLASTIN TIME PARTIAL: CPT

## 2024-09-19 PROCEDURE — 84100 ASSAY OF PHOSPHORUS: CPT

## 2024-09-19 PROCEDURE — 83735 ASSAY OF MAGNESIUM: CPT

## 2024-09-19 PROCEDURE — 83695 ASSAY OF LIPOPROTEIN(A): CPT

## 2024-09-19 PROCEDURE — 25000003 PHARM REV CODE 250

## 2024-09-19 PROCEDURE — 85303 CLOT INHIBIT PROT C ACTIVITY: CPT

## 2024-09-19 PROCEDURE — 85610 PROTHROMBIN TIME: CPT

## 2024-09-19 PROCEDURE — 85300 ANTITHROMBIN III ACTIVITY: CPT

## 2024-09-19 PROCEDURE — 86160 COMPLEMENT ANTIGEN: CPT

## 2024-09-19 PROCEDURE — 80061 LIPID PANEL: CPT

## 2024-09-19 PROCEDURE — 85025 COMPLETE CBC W/AUTO DIFF WBC: CPT

## 2024-09-19 PROCEDURE — 82728 ASSAY OF FERRITIN: CPT

## 2024-09-19 PROCEDURE — 85613 RUSSELL VIPER VENOM DILUTED: CPT

## 2024-09-19 PROCEDURE — 80074 ACUTE HEPATITIS PANEL: CPT

## 2024-09-19 PROCEDURE — 36415 COLL VENOUS BLD VENIPUNCTURE: CPT | Performed by: INTERNAL MEDICINE

## 2024-09-19 PROCEDURE — 81241 F5 GENE: CPT

## 2024-09-19 PROCEDURE — 83090 ASSAY OF HOMOCYSTEINE: CPT

## 2024-09-19 PROCEDURE — 85384 FIBRINOGEN ACTIVITY: CPT

## 2024-09-19 PROCEDURE — 63600175 PHARM REV CODE 636 W HCPCS: Performed by: STUDENT IN AN ORGANIZED HEALTH CARE EDUCATION/TRAINING PROGRAM

## 2024-09-19 PROCEDURE — 25000003 PHARM REV CODE 250: Performed by: STUDENT IN AN ORGANIZED HEALTH CARE EDUCATION/TRAINING PROGRAM

## 2024-09-19 PROCEDURE — 80202 ASSAY OF VANCOMYCIN: CPT | Performed by: STUDENT IN AN ORGANIZED HEALTH CARE EDUCATION/TRAINING PROGRAM

## 2024-09-19 PROCEDURE — 94761 N-INVAS EAR/PLS OXIMETRY MLT: CPT

## 2024-09-19 PROCEDURE — 21400001 HC TELEMETRY ROOM

## 2024-09-19 PROCEDURE — 80321 ALCOHOLS BIOMARKERS 1OR 2: CPT | Performed by: INTERNAL MEDICINE

## 2024-09-19 PROCEDURE — 80053 COMPREHEN METABOLIC PANEL: CPT

## 2024-09-19 RX ORDER — METOPROLOL TARTRATE 50 MG/1
50 TABLET ORAL 2 TIMES DAILY
Status: DISCONTINUED | OUTPATIENT
Start: 2024-09-19 | End: 2024-09-19

## 2024-09-19 RX ORDER — HYDRALAZINE HYDROCHLORIDE 25 MG/1
25 TABLET, FILM COATED ORAL EVERY 8 HOURS
Status: DISCONTINUED | OUTPATIENT
Start: 2024-09-19 | End: 2024-09-20 | Stop reason: HOSPADM

## 2024-09-19 RX ORDER — POTASSIUM CHLORIDE 14.9 MG/ML
40 INJECTION INTRAVENOUS ONCE
Status: DISCONTINUED | OUTPATIENT
Start: 2024-09-19 | End: 2024-09-19

## 2024-09-19 RX ORDER — IBUPROFEN 400 MG/1
800 TABLET ORAL ONCE
Status: COMPLETED | OUTPATIENT
Start: 2024-09-20 | End: 2024-09-20

## 2024-09-19 RX ORDER — MAGNESIUM SULFATE HEPTAHYDRATE 40 MG/ML
4 INJECTION, SOLUTION INTRAVENOUS ONCE
Status: DISCONTINUED | OUTPATIENT
Start: 2024-09-19 | End: 2024-09-19

## 2024-09-19 RX ORDER — MAGNESIUM SULFATE HEPTAHYDRATE 40 MG/ML
2 INJECTION, SOLUTION INTRAVENOUS ONCE
Status: COMPLETED | OUTPATIENT
Start: 2024-09-19 | End: 2024-09-19

## 2024-09-19 RX ORDER — POTASSIUM CHLORIDE 7.45 MG/ML
10 INJECTION INTRAVENOUS
Status: COMPLETED | OUTPATIENT
Start: 2024-09-19 | End: 2024-09-19

## 2024-09-19 RX ORDER — IBUPROFEN 400 MG/1
800 TABLET ORAL ONCE
Status: DISCONTINUED | OUTPATIENT
Start: 2024-09-20 | End: 2024-09-19

## 2024-09-19 RX ORDER — AMLODIPINE BESYLATE 5 MG/1
5 TABLET ORAL DAILY
Status: DISCONTINUED | OUTPATIENT
Start: 2024-09-19 | End: 2024-09-20 | Stop reason: HOSPADM

## 2024-09-19 RX ADMIN — SUCRALFATE 1 G: 1 SUSPENSION ORAL at 12:09

## 2024-09-19 RX ADMIN — AMLODIPINE BESYLATE 5 MG: 5 TABLET ORAL at 11:09

## 2024-09-19 RX ADMIN — POTASSIUM CHLORIDE 10 MEQ: 7.46 INJECTION, SOLUTION INTRAVENOUS at 10:09

## 2024-09-19 RX ADMIN — DEXTROSE MONOHYDRATE 1250 MG: 50 INJECTION, SOLUTION INTRAVENOUS at 06:09

## 2024-09-19 RX ADMIN — FOLIC ACID 1 MG: 1 TABLET ORAL at 09:09

## 2024-09-19 RX ADMIN — FOLIC ACID: 5 INJECTION, SOLUTION INTRAMUSCULAR; INTRAVENOUS; SUBCUTANEOUS at 10:09

## 2024-09-19 RX ADMIN — SODIUM PHOSPHATE, MONOBASIC, MONOHYDRATE AND SODIUM PHOSPHATE, DIBASIC, ANHYDROUS 30 MMOL: 142; 276 INJECTION, SOLUTION INTRAVENOUS at 12:09

## 2024-09-19 RX ADMIN — POTASSIUM CHLORIDE 10 MEQ: 7.46 INJECTION, SOLUTION INTRAVENOUS at 09:09

## 2024-09-19 RX ADMIN — SODIUM CHLORIDE, POTASSIUM CHLORIDE, SODIUM LACTATE AND CALCIUM CHLORIDE: 600; 310; 30; 20 INJECTION, SOLUTION INTRAVENOUS at 09:09

## 2024-09-19 RX ADMIN — SUCRALFATE 1 G: 1 SUSPENSION ORAL at 11:09

## 2024-09-19 RX ADMIN — Medication 6 MG: at 09:09

## 2024-09-19 RX ADMIN — HYDRALAZINE HYDROCHLORIDE 10 MG: 20 INJECTION INTRAMUSCULAR; INTRAVENOUS at 08:09

## 2024-09-19 RX ADMIN — HYDRALAZINE HYDROCHLORIDE 25 MG: 25 TABLET ORAL at 02:09

## 2024-09-19 RX ADMIN — MUPIROCIN: 20 OINTMENT TOPICAL at 10:09

## 2024-09-19 RX ADMIN — SODIUM CHLORIDE, POTASSIUM CHLORIDE, SODIUM LACTATE AND CALCIUM CHLORIDE: 600; 310; 30; 20 INJECTION, SOLUTION INTRAVENOUS at 05:09

## 2024-09-19 RX ADMIN — POTASSIUM CHLORIDE 10 MEQ: 7.46 INJECTION, SOLUTION INTRAVENOUS at 11:09

## 2024-09-19 RX ADMIN — PANTOPRAZOLE SODIUM 40 MG: 40 INJECTION, POWDER, FOR SOLUTION INTRAVENOUS at 09:09

## 2024-09-19 RX ADMIN — MAGNESIUM SULFATE HEPTAHYDRATE 2 G: 40 INJECTION, SOLUTION INTRAVENOUS at 08:09

## 2024-09-19 RX ADMIN — POTASSIUM CHLORIDE 10 MEQ: 7.46 INJECTION, SOLUTION INTRAVENOUS at 08:09

## 2024-09-19 RX ADMIN — SUCRALFATE 1 G: 1 SUSPENSION ORAL at 05:09

## 2024-09-19 RX ADMIN — RIVAROXABAN 15 MG: 15 TABLET, FILM COATED ORAL at 05:09

## 2024-09-19 RX ADMIN — SUCRALFATE 1 G: 1 SUSPENSION ORAL at 06:09

## 2024-09-19 RX ADMIN — HYDRALAZINE HYDROCHLORIDE 25 MG: 25 TABLET ORAL at 09:09

## 2024-09-19 RX ADMIN — MUPIROCIN: 20 OINTMENT TOPICAL at 09:09

## 2024-09-19 NOTE — PT/OT/SLP PROGRESS
"Physical Therapy    Missed Treatment Session - patient refusal note    Patient Name:  Nahed Harvey   MRN:  [id number]      -patient not seen at this time secondary to patient unwilling to participate  -patient ambulating from restroom to bed  -patient questioned (by patients nurse Birgit) as to williness to participate in therapy session (evaluation) -per patient-"not yet"  -will follow-up as patient is appropriate/available/agreeable to participate and as therapists' schedule allows.  "

## 2024-09-19 NOTE — PROGRESS NOTES
[facility] Medicine Wards Progress Note     Resident Team: [facility] Medicine List 1  Attending Physician: Hunter Javed MD    Date of Admit: 9/16/2024  Current Hospital Day: 4     Subjective:      Brief HPI:  48 y.o. female with a past medical history of Gastroparesis, Hypertension, pulmonary embolism in 2022, left-sided nephrectomy for donation in 2006 presents to ED with primary complaint of abdominal pain and worsening N/V for the last 24 hours.  Patient states the pain was initially sharp but is now a dull pain.  Onset was gradual and has worsened since onset.  She states the pain initially started in her right upper abdomen, then it radiated to her right lower abdomen and now to her right chest.   Patient reports a chronic history of nausea with a hx of gastroparesis but states that nausea has worsened within the last day.  She reports multiple bouts of emesis, occurring every 20 minutes.   Patient reports that she has had similar symptoms in the past but is unable to remember what happened at those times due to current fatigue.  She denies any blood in emesis, fever, chills, changes in bowel movement, chest pain, dysuria, increased urinary frequency or urgency, vaginal discharge, cough, sore throat, nasal congestion.   She does report difficulty catching her breath.  She states that she has a history of panic attacks that have been occurring since she was diagnosed with pulmonary embolism in 2022. She is not on any medications for this.      Social history: Social drinker.  Reports smoking about 2-3 cigarettes daily. Denies recreational drug use.     ED course:  Upon initial presentation to the ED, patient was afebrile, tachycardic to 134, /88, RR 18, O2 sats 98% on room air.  CBC revealed leukocytosis with WBC of 18.  CMP revealed mild hyperkalemia with potassium of 5.3, AGMA with bicarb 6, chloride 98, ANNEL with creatinine of 1.66, elevated transaminases with ALT/AST of 103/163.  Initial lactic acid was elevated  at 21.  CT AP without contrast revealed possible esophagitis and right-sided pyelonephritis.  CT CAP with IV contrast revealed same as above along with gastritis.  Patient received 3.5 L of LR.  Lactic acid was repeated and it was 19.  Internal medicine was consulted for further workup and management of lactic acidosis, esophagitis and pyelonephritis.    Interval History: NAEON. VSS elevated /98.  Urinary output over the last 24 hours 300 cc.  She is leukopenic WBC 3.55, H and H stable, anemia.  Potassium 3.  BUN low 8, kidney function improving.  Corrected calcium 9.7, LFTs improving.  CMP otherwise unremarkable.     Review of Systems   Constitutional:  Negative for chills and fever.   Eyes:  Negative for blurred vision and double vision.   Respiratory:  Negative for cough and shortness of breath.    Cardiovascular:  Negative for chest pain, palpitations and leg swelling.   Gastrointestinal:  Positive for abdominal pain (Right upper quadrant pain) and nausea. Negative for diarrhea and vomiting.   Genitourinary:  Negative for dysuria, frequency and urgency.   Musculoskeletal:  Negative for back pain, joint pain and neck pain.   Skin:  Negative for itching and rash.   Neurological:  Negative for weakness and headaches.          Objective:     Vital Signs:  Vital Signs (Most Recent):  Temp: 99.1 °F (37.3 °C) (09/18/24 2317)  Pulse: 67 (09/19/24 0346)  Resp: 16 (09/19/24 0346)  BP: (!) 146/98 (09/19/24 0346)  SpO2: 97 % (09/19/24 0346) Vital Signs (24h Range):  Temp:  [98.2 °F (36.8 °C)-99.1 °F (37.3 °C)] 99.1 °F (37.3 °C)  Pulse:  [67-81] 67  Resp:  [16-18] 16  SpO2:  [97 %-99 %] 97 %  BP: (137-162)/() 146/98   Body mass index is 28.32 kg/m².     Intake/Output Summary (Last 24 hours) at 9/19/2024 0617  Last data filed at 9/18/2024 1807  Gross per 24 hour   Intake 1677.42 ml   Output 300 ml   Net 1377.42 ml       Physical Exam  Vitals and nursing note reviewed.   Constitutional:       Appearance: Normal  "appearance.   HENT:      Head: Normocephalic and atraumatic.      Mouth/Throat:      Mouth: Mucous membranes are moist.      Pharynx: Oropharynx is clear.   Eyes:      Extraocular Movements: Extraocular movements intact.      Conjunctiva/sclera: Conjunctivae normal.      Pupils: Pupils are equal, round, and reactive to light.   Cardiovascular:      Rate and Rhythm: Normal rate and regular rhythm.      Pulses: Normal pulses.      Heart sounds: Normal heart sounds. No murmur heard.     No friction rub. No gallop.   Pulmonary:      Effort: Pulmonary effort is normal. No respiratory distress.      Breath sounds: Normal breath sounds. No stridor. No wheezing, rhonchi or rales.   Chest:      Chest wall: No tenderness.   Abdominal:      General: Abdomen is flat. Bowel sounds are normal. There is no distension.      Palpations: Abdomen is soft. There is no mass.      Tenderness: There is abdominal tenderness (RUQ). There is no guarding or rebound.      Hernia: No hernia is present.   Musculoskeletal:         General: No swelling, tenderness, deformity or signs of injury.      Cervical back: Normal range of motion and neck supple. No rigidity or tenderness.      Right lower leg: No edema.      Left lower leg: No edema.   Skin:     General: Skin is warm and dry.      Capillary Refill: Capillary refill takes less than 2 seconds.      Coloration: Skin is not jaundiced or pale.      Findings: No bruising, erythema, lesion or rash.   Neurological:      General: No focal deficit present.      Mental Status: She is alert and oriented to person, place, and time.             Laboratory:    Recent Labs   Lab 09/19/24  0424   WBC 3.55*   HGB 10.4*   HCT 29.2*   PLT 58*   .3*   RDW 13.9     No results for input(s): "TROPONINI", "CKTOTAL", "CKMB", "BNP" in the last 24 hours.    Recent Labs   Lab 09/17/24  2202   TROPONINI 0.109*     No results for input(s): "CHOL", "HDL", "LDLCALC", "TRIG", "CHOLHDL" in the last 168 hours. Recent " Labs   Lab 09/19/24  0424      K 3.0*   CL 99   CO2 28   BUN 8.0*   CREATININE 1.02   CALCIUM 7.8*   MG 1.60   PHOS 2.3     Recent Labs   Lab 09/19/24  0424   ALBUMIN 2.6*   BILITOT 0.9   *   ALKPHOS 64   ALT 78*     Recent Labs   Lab 09/17/24  0200 09/17/24  0405   ZIFWOVGG09 1,726*  --    FOLATE  --  <2.2*     Recent Labs   Lab 09/17/24  0405 09/18/24  0657   TSH 0.900  --    HGBA1C 4.6  --    INR  --  1.4*          Microbiology Data:  Microbiology Results (last 7 days)       Procedure Component Value Units Date/Time    Blood Culture #2 **CANNOT BE ORDERED STAT** [[id number]]  (Normal) Collected: 09/16/24 2251    Order Status: Completed Specimen: Blood from Hand, Right Updated: 09/18/24 1102     Blood Culture No Growth At 24 Hours    Blood Culture #1 **CANNOT BE ORDERED STAT** [[id number]]  (Normal) Collected: 09/16/24 2251    Order Status: Completed Specimen: Blood from Arm, Left Updated: 09/18/24 1102     Blood Culture No Growth At 24 Hours               Radiology:  CT Abdomen Pelvis  Without Contrast 09/16/2024    Narrative  EXAMINATION:  CT ABDOMEN PELVIS WITHOUT CONTRAST    CLINICAL HISTORY:  Abdominal abscess/infection suspected;    TECHNIQUE:  Helically acquired axial images, sagittal and coronal reformations were obtained from the lung bases to the pubic symphysis without the IV administration of contrast.    Automated tube current modulation, weight-based exposure dosing, and/or iterative reconstruction technique utilized to reach lowest reasonably achievable exposure rate.    DLP: 487 mGy*cm    COMPARISON:  CT chest abdomen pelvis 09/13/2022    FINDINGS:  HEART: Normal in size. No pericardial effusion.    LUNG BASES: Well aerated.    LIVER: Severe hepatic steatosis.  Defer to prior contrast enhanced exam regarding probable hemangiomata and cysts.    BILIARY: Gallbladder is not overly distended.    PANCREAS: No inflammatory change.    SPLEEN: Normal in size    ADRENALS: No  mass.    KIDNEYS/URETERS: Postop left nephrectomy.  No right hydronephrosis.  Nonspecific perinephric stranding.    GI TRACT/MESENTERY: Evaluation of the bowel is limited without contrast.  Edema at the lower esophagus.  No evidence of bowel obstruction.    PERITONEUM: No free fluid.No free air.    LYMPH NODES: No enlarged lymph nodes by size criteria.    VASCULATURE: Aortoiliac atherosclerosis.    BLADDER: Normal appearance given degree of distention.    REPRODUCTIVE ORGANS: Decreased size probable subserosal uterine fibroid.    ABDOMINAL WALL: Unremarkable.    BONES: No acute osseous abnormality.    Impression  1. Edematous appearance of the lower esophagus  2. Severe hepatic steatosis  3. The preliminary and final reports are concordant.      Electronically signed by: Amairani Reyes  Date:    09/17/2024  Time:    11:03         Imaging Results              X-Ray Chest 1 View (Final result)  Result time 09/17/24 07:47:11      Final result by Yaron Ventura MD (09/17/24 07:47:11)                   Impression:      Minimal blunting of the left costophrenic angle might indicate a small left-sided pleural effusion.    Otherwise no active pulmonary disease      Electronically signed by: Yaron Ventura  Date:    09/17/2024  Time:    07:47               Narrative:    EXAMINATION:  XR CHEST 1 VIEW    CPT 68779    CLINICAL HISTORY:  shortness of breath;    COMPARISON:  August 22, 2024    FINDINGS:  Examination reveals mediastinal silhouette to be within normal limits cardiac silhouette is not enlarged there is some blunting of the left costophrenic angle which may indicate the presence of left-sided pleural effusion.    No focal consolidative changes atelectasis or other effusions identified                                       CT Chest Abdomen Pelvis With IV Contrast (XPD) Routine Oral Contrast (Final result)  Result time 09/17/24 10:50:21      Final result by Amairani Reyes MD (09/17/24 10:50:21)                    Impression:      1. Changes suggestive of esophagitis.  2. The preliminary and final reports are concordant.      Electronically signed by: Amairani Reyes  Date:    09/17/2024  Time:    10:50               Narrative:    EXAMINATION:  CT CHEST ABDOMEN PELVIS WITH IV CONTRAST (XPD)    CLINICAL HISTORY:  Aortic dissection suspected;    TECHNIQUE:  Helical acquisition with axial, sagittal and coronal reformations obtained from the thoracic inlet to the pubic symphysis following the IV administration of contrast.    Automated tube current modulation, weight-based exposure dosing, and/or iterative reconstruction technique utilized to reach lowest reasonably achievable exposure rate.    DLP: 382 mGy*cm    COMPARISON:  CT abdomen pelvis 09/16/2024, CT chest 12/09/2023, CT chest abdomen pelvis 09/13/2022    FINDINGS:  BASE OF NECK: No significant abnormality.    HEART: Normal size. No effusion.    THORACIC VASCULATURE: No aortic aneurysm and no significant atherosclerosis.Pulmonary arteries enhance normally.    AGUSTÍN/MEDIASTINUM: The wall of the esophagus appears thickened and edematous with periesophageal fat stranding.    AIRWAYS: Patent.    LUNGS/PLEURA: No lobar consolidation, pleural effusion or pneumothorax.  Very mild ground-glass density at the lung bases may be related to developing atelectasis or mild pneumonitis.    THORACIC SOFT TISSUES: Unremarkable.    LIVER: Severe hepatic steatosis.  Probable small hepatic cysts and hemangioma, similar to prior exam.    BILIARY: No calcified gallstones.    PANCREAS: No inflammatory change.    SPLEEN: Normal in size    ADRENALS: No mass.    KIDNEYS/URETERS: No right hydronephrosis.  Left kidney surgically absent.  No hydronephrosis.    GI TRACT/MESENTERY:  No evidence of bowel obstruction.  Colonic diverticulosis without acute inflammatory change.    PERITONEUM: No free fluid.No free air.    LYMPH NODES: No enlarged lymph nodes by size criteria.    ABDOMINOPELVIC  VASCULATURE: Mild aortic atherosclerosis.    BLADDER: Normal appearance given degree of distention.    REPRODUCTIVE ORGANS: Chronic right adnexal lesion, decreased in size compared to prior exam in 2022.  Lesion currently measures 6 cm; previously 10 cm.  Suspect subserosal fibroid.    ABDOMINAL WALL: Unremarkable.    BONES: No acute osseous abnormality.                        Preliminary result by Bakari Solorio MD (09/17/24 01:14:17)                   Impression:    1. There is diffuse wall thickening of the collapsed esophagus with associated mucosal thickening consistent with esophagitis.  2. There is diffuse gastric wall thickening with associated mucosal wall thickening which may represent an element of gastritis.  3. There is a 2.6 cm peripherally enhancing ovoid hypodensity along anterior myometrium centered on series 2 image 173. Correlate clinically as regards additional evaluation and follow-up.  4. Details and other findings as discussed above.               Narrative:    START OF REPORT:  Technique: CT Scan of the chest abdomen and pelvis was performed with intravenous contrast with axial as well as sagittal and, coronal images.    Dosage Information: Automated Exposure Control was utilized.    Comparison: Comparison is with study dated 2024-09-16 23:04:03.    Clinical History: Abdominal Pain(Began vomiting last night around 0000, worsened this evening and also began having lower abdominal pain that she states has since migrated upwards towards chest. 4mg zofran per EMS. Hx gastroparesis) Nausea Vomiting.    Findings:  Soft Tissues: Unremarkable.  Lines and Tubes: None.  Neck: The visualized soft tissues of the neck appear unremarkable.  Mediastinum: There is diffuse wall thickening of the collapsed esophagus with associated mucosal thickening consistent with esophagitis.  Heart: Mild cardiomegaly is seen.  Aorta: Unremarkable appearing aorta. No aortic dissection or aneurysm is seen.  Pulmonary  Arteries: No filling defects are seen in the pulmonary arteries to suggest pulmonary embolus to the sensitivity of the study.  Lungs: Mild streaky linear opacity is seen consistent with scarring subsegmental atelectasis. No focal infiltrate or consolidation identified.  Pleura: No effusions or pneumothorax are identified.  Bony Structures:  Spine: Mild spondylolytic changes are seen in the thoracic spine.  Ribs: The ribs appear unremarkable.  Abdomen:  Abdominal Wall: No abdominal wall pathology is seen.  Liver: Pronounced fatty infiltration of the liver is present. There is a 1.8 cm focal area of fat sparing in the lateral right hepatic lobe on series 2 image 93 also seen in the non contrast prior study. There is better delineation of a subcentimeter cyst in the inferolateral right hepatic lobe.  Biliary System: No intrahepatic or extrahepatic biliary duct dilatation is seen.  Gallbladder: The gallbladder appears unremarkable.  Pancreas: The pancreas appears unremarkable.  Spleen: The spleen appears unremarkable.  Adrenals: The adrenal glands appear unremarkable.  Kidneys: The right kidney appears unremarkable with no stones cysts masses or hydronephrosis. Nonspecific perinephric fat stranding is noted on the right. Surgical clips are seen in the left renal bed which may reflect prior nephrectomy, correlate with surgical history.  Aorta: The abdominal aorta appears unremarkable.  IVC: Unremarkable.  Bowel:  Stomach: There is diffuse gastric wall thickening with associated mucosal wall thickening which may represent an element of gastritis.  Duodenum: Unremarkable appearing duodenum.  Small Bowel: The small bowel appears unremarkable.  Colon: There is moderate stool in the descending and sigmoid colon which could reflect an element of constipation. A few diverticula are seen in the transverse colon. No associated inflammatory stranding is seen to suggest diverticulitis.  Appendix: The appendix is not identified but  no inflammatory changes are seen in the right lower quadrant to suggest appendicitis.  Peritoneum: No intraperitoneal free air or ascites is seen.    Pelvis:  Bladder: The bladder appears unremarkable.  Female:  Uterus: There is a 2.6 cm peripherally enhancing ovoid hypodensity along anterior myometrium centered on series 2 image 173.  Ovaries: No adnexal masses are seen.    Bony structures:  Dorsal Spine: There is mild spondylosis of the visualized dorsal spine.  Bony Pelvis: The visualized bony structures of the pelvis appear unremarkable.                                         CT Abdomen Pelvis  Without Contrast (Final result)  Result time 09/17/24 11:03:54      Final result by Amairani Reyes MD (09/17/24 11:03:54)                   Impression:      1. Edematous appearance of the lower esophagus  2. Severe hepatic steatosis  3. The preliminary and final reports are concordant.      Electronically signed by: Amairani Reyes  Date:    09/17/2024  Time:    11:03               Narrative:    EXAMINATION:  CT ABDOMEN PELVIS WITHOUT CONTRAST    CLINICAL HISTORY:  Abdominal abscess/infection suspected;    TECHNIQUE:  Helically acquired axial images, sagittal and coronal reformations were obtained from the lung bases to the pubic symphysis without the IV administration of contrast.    Automated tube current modulation, weight-based exposure dosing, and/or iterative reconstruction technique utilized to reach lowest reasonably achievable exposure rate.    DLP: 487 mGy*cm    COMPARISON:  CT chest abdomen pelvis 09/13/2022    FINDINGS:  HEART: Normal in size. No pericardial effusion.    LUNG BASES: Well aerated.    LIVER: Severe hepatic steatosis.  Defer to prior contrast enhanced exam regarding probable hemangiomata and cysts.    BILIARY: Gallbladder is not overly distended.    PANCREAS: No inflammatory change.    SPLEEN: Normal in size    ADRENALS: No mass.    KIDNEYS/URETERS: Postop left nephrectomy.  No right  hydronephrosis.  Nonspecific perinephric stranding.    GI TRACT/MESENTERY: Evaluation of the bowel is limited without contrast.  Edema at the lower esophagus.  No evidence of bowel obstruction.    PERITONEUM: No free fluid.No free air.    LYMPH NODES: No enlarged lymph nodes by size criteria.    VASCULATURE: Aortoiliac atherosclerosis.    BLADDER: Normal appearance given degree of distention.    REPRODUCTIVE ORGANS: Decreased size probable subserosal uterine fibroid.    ABDOMINAL WALL: Unremarkable.    BONES: No acute osseous abnormality.                        Preliminary result by Bakari Solorio MD (09/16/24 23:39:42)                   Impression:    1. There appears to be mild thickening versus underdistention of the distal esophagus. Correlate clinically as regards possible reflux esophagitis.  2. There is mildly prominent perinephric stranding on the right. The right kidney otherwise appears unremarkable with no stones cysts masses or hydronephrosis. Correlate with clinical and laboratory findings as regards possible right-sided pyelonephritis.  3. No definite acute intraabdominal or pelvic solid organ or bowel pathology identified on this noncontrast scan. Details and other findings as discussed above.               Narrative:    START OF REPORT:  Technique: CT of the abdomen and pelvis was performed with axial images as well as sagittal and coronal reconstruction images without intravenous contrast.    Comparison: None available.    Clinical History: Abdominal Pain(Began vomiting last night around 0000, worsened this evening and also began having lower abdominal pain that she states has since migrated upwards towards chest. 4mg zofran per EMS. Hx gastroparesis) Nausea Vomiting.    Dosage Information: Automated Exposure Control was utilized.    Findings:  Lines and Tubes: None.  Thorax:  Lungs: The visualized lung bases appear unremarkable.  Pleura: No effusions or thickening are seen. No pneumothorax is  seen in the visualized lung bases.  Heart: The heart size is within normal limits.  Abdomen:  Abdominal Wall: No abdominal wall pathology is seen.  Liver: Pronounced fatty infiltration of the liver is present. The liver otherwise appears unremarkable.  Biliary System: No intrahepatic or extrahepatic biliary duct dilatation is seen.  Gallbladder: Moderate hyperdensity is seen in the gallbladder which may represent sludge. The gallbladder otherwise appears unremarkable.  Pancreas: The pancreas appears unremarkable.  Spleen: The spleen appears unremarkable.  Adrenals: The adrenal glands appear unremarkable.  Kidneys: Post-nephrectomy changes are seen in the left. There is mildly prominent perinephric stranding on the right. The right kidney otherwise appears unremarkable with no stones cysts masses or hydronephrosis.  Aorta: There is minimal calcification of the abdominal aorta and its branches.  Bowel:  Esophagus: There appears to be mild thickening versus underdistention of the distal esophagus.  Stomach: The stomach appears unremarkable.  Duodenum: Unremarkable appearing duodenum.  Small Bowel: The small bowel appears unremarkable.  Colon: Nondistended. There is moderate stool in the colon which could reflect an element of constipation.  Appendix: The appendix appears unremarkable and is seen on Image 81, Series 2 through Image 83, Series 2.  Peritoneum: No intraperitoneal free air or ascites is seen.    Pelvis:  Bladder: The bladder appears unremarkable.  Female:  Uterus: The uterus appears prominent with a lobulated contour. This is suggestive of uterine myomas.  Ovaries: No adnexal masses are seen.    Bony structures:  Dorsal Spine: There is mild multilevel spondylosis of the visualized dorsal spine.  Bony Pelvis: The visualized bony structures of the pelvis appear unremarkable.                                      No results found in the last 24 hours.     Current Medications:     Infusions:   lactated ringers    "Intravenous Continuous 125 mL/hr at 09/19/24 0502 New Bag at 09/19/24 0502         Scheduled:   folic acid  1 mg Oral Daily    HYDROmorphone  0.5 mg Intravenous Once    mupirocin   Nasal BID    nicotine  1 patch Transdermal Daily    pantoprazole  40 mg Intravenous Daily    piperacillin-tazobactam (Zosyn) IV (PEDS and ADULTS) (extended infusion is not appropriate)  4.5 g Intravenous Q8H    rivaroxaban  15 mg Oral Daily with dinner    sucralfate  1 g Oral Q6H    vancomycin (VANCOCIN) IV (PEDS and ADULTS)  1,250 mg Intravenous Q12H         PRN:   folic acid tablet 1 mg    HYDROmorphone injection 0.5 mg    mupirocin 2 % ointment    nicotine 14 mg/24 hr 1 patch    pantoprazole injection 40 mg    piperacillin-tazobactam (ZOSYN) 4.5 g in D5W 100 mL IVPB (MB+)    rivaroxaban tablet 15 mg    sucralfate 100 mg/mL suspension 1 g    vancomycin 1,250 mg in D5W 250 mL IVPB (admixture device)        Antibiotics and Day Number of Therapy:  Antibiotics (From admission, onward)      Start     Stop Route Frequency Ordered    09/19/24 0600  vancomycin 1,250 mg in D5W 250 mL IVPB (admixture device)         -- IV Every 12 hours (non-standard times) 09/19/24 0556    09/17/24 0915  mupirocin 2 % ointment         09/22/24 0859 Nasl 2 times daily 09/17/24 0802    09/17/24 0730  piperacillin-tazobactam (ZOSYN) 4.5 g in D5W 100 mL IVPB (MB+)         -- IV Every 8 hours (non-standard times) 09/17/24 0235    09/17/24 0333  vancomycin - pharmacy to dose  (vancomycin IVPB (PEDS and ADULTS))        Placed in "And" Linked Group    -- IV pharmacy to manage frequency 09/17/24 0234                 Assessment & Plan:     Sepsis of unknown source  Lactic acidosis  Starvation ketosis?  Esophagitis  Leukocytosis  - Met SIRS criteria 3/4 on admission - WBC: 18  HR: 134  RR: 23  T: 97.8  - Initial Lactic acid 21, currently normalized  - CT CAP  with and without IV contrast done at the ED.  Results showed esophagitis and gastritis.    - CXR minimal " blunting of the left costophrenic angle may indicate small left-sided pleural effusion  - EKG reveals sinus tachycardia  - U/A unremarkable   - Received resuscitation fluids at 30 mL/kg within the first 3 hours at the ED  -   - Broad spectrum coverage with Vanc/Zosyn  - Blood cx x2 pending  - Strict intake and output  - Telemetry  - Supplemental oxygen prn  - started patient on banana bag  - ordered lipid panel, HIV, syphilis, hepatitis panel, coagulation, kidney ultrasound with Doppler, rheumatological workup.              AGMA  Suspected poisoning/overdose versus progressing hepatic disease  - Initial AG of 40, bicarb 6, chloride 99  - Lactic acid levels as stated above, currently normalized  - ABG revealed PH of 7.26, PCO2 - 19, PO2 - 143  - received 3 amps of bicarb  - Consider Nephrology consult if no improvement        Abdominal pain  Intractable nausea & vomiting  Hx of gastroparesis  - Abdominal pain improved after receiving morphine in the ED  - IV Zofran 4 mg as needed for nausea   - consulted GI, appreciate recommendations.  - plans for EGD followed by gastric emptying scan (no history of previous scan for diagnosis of gastroparesis)  - started Carafate       ANNEL  Left nephrectomy for kidney donation in 2006  - Cr: 1.66 on arrival  Baseline: 0.8-0.9  - Continue maintenance fluids  - UA unremarkable, Utox positive for opioids and cannabinoids, opioids administered inpatient  - Avoid nephrotoxic agents  - Will continue to monitor with morning labs        Hx of pulmonary embolus and DVT in 2022  - Initially had an IVC filter that was removed in 3/2024.   - Upon chart review, patient is on Xarelto        Hypertension  - Holding home Hydralazine due to inability to tolerate p.o. Restarted home p.o. meds.  - PRN IV antihypertensives  - started amlodipine, DC metoprolol          Access: PIV  Antibiotics: Vanc/Zosyn  Diet: NPO  DVT Prophylaxis: Xarelto  GI Prophylaxis: Protonix  Fluids:  LR 75         Disposition: Replete fluids and electrolytes. Consulted GI for EGD, plans to follow up with gastric emptying scan.  Labs pending We will continue to monitor.        Yash Sweeney DO  [facility], PGY-I          Attending addendum to follow.

## 2024-09-19 NOTE — PROGRESS NOTES
Pharmacokinetic Assessment Follow Up: IV Vancomycin    Vancomycin serum concentration assessment(s):    The trough level was drawn correctly and can be used to guide therapy at this time. The measurement is below the desired definitive target range of 15 to 20 mcg/mL.    Vancomycin Regimen Plan:    Change regimen to Vancomycin 1250 mg IV every 12 hours with next serum trough concentration measured at 0500 prior to 3rd dose on 9/20/2024.    Drug levels (last 3 results):  Recent Labs   Lab Result Units 09/18/24  1639 09/19/24  0424   Vancomycin Trough ug/ml 21.8* 11.6*       Pharmacy will continue to follow and monitor vancomycin.    Please contact pharmacy at extension [phone] for questions regarding this assessment.    Thank you for the consult,   Marly Barillas       Patient brief summary:  Nahed Harvey is a 48 y.o. female initiated on antimicrobial therapy with IV Vancomycin for treatment of sepsis    The patient's current regimen is Vancomycin 1250 mg every 12 hours.     Drug Allergies:   Review of patient's allergies indicates:  No Known Allergies      Renal Function:   Estimated Creatinine Clearance: 65.3 mL/min (based on SCr of 1.02 mg/dL).,     Dialysis Method (if applicable):  N/A    CBC (last 72 hours):  Recent Labs   Lab Result Units 09/16/24 2234 09/17/24  0405 09/18/24  0330 09/19/24  0424   WBC x10(3)/mcL 18.16* 11.86* 7.38 3.55*   Hgb g/dL 13.4 12.8 10.7* 10.4*   Hemoglobin A1c %  --  4.6  --   --    Hct % 41.5 36.8* 31.7* 29.2*   Platelet x10(3)/mcL 152 102* 76* 58*   Mono % % 4.0 2.1 5.0 5.4   Eos % % 0.0 0.0 0.1 0.8   Basophil % % 0.4 0.3 0.3 0.8       Metabolic Panel (last 72 hours):  Recent Labs   Lab Result Units 09/16/24  2234 09/17/24  0200 09/17/24  0715 09/17/24  1542 09/18/24  0330 09/19/24  0424   Sodium mmol/L 144 138 139  --  139 137   Potassium mmol/L 5.3* 5.4* 4.6  --  3.5 3.0*   Chloride mmol/L 98 99 99  --  102 99   CO2 mmol/L 6* <5* 11*  --  26 28   Glucose mg/dL 63* 64* 181*  --  112*  86   Glucose, UA   --   --   --  Normal  --   --    Blood Urea Nitrogen mg/dL 14.8 13.3 13.1  --  15.3 8.0*   Creatinine mg/dL 1.66* 1.44* 1.58*  --  1.43* 1.02   Albumin g/dL 3.8 3.3*  --   --  2.8* 2.6*   Bilirubin Total mg/dL 0.5 0.7  --   --  0.7 0.9   ALP unit/L 120 105  --   --  69 64   AST unit/L 163* 144*  --   --  150* 119*   ALT unit/L 103* 91*  --   --  79* 78*   Magnesium Level mg/dL 1.80 1.50*  --   --  1.90 1.60   Phosphorus Level mg/dL 9.0* 7.2*  --   --  1.1* 2.3       Vancomycin Administrations:  vancomycin given in the last 96 hours                     vancomycin (VANCOCIN) 1,000 mg in D5W 250 mL IVPB (admixture device) (mg) 1,000 mg New Bag 09/18/24 0548     1,000 mg New Bag 09/17/24 1817    vancomycin 1,250 mg in D5W 250 mL IVPB (admixture device) (mg) 1,250 mg New Bag 09/17/24 0536                    Microbiologic Results:  Microbiology Results (last 7 days)       Procedure Component Value Units Date/Time    Blood Culture #2 **CANNOT BE ORDERED STAT** [[id number]]  (Normal) Collected: 09/16/24 2251    Order Status: Completed Specimen: Blood from Hand, Right Updated: 09/18/24 1102     Blood Culture No Growth At 24 Hours    Blood Culture #1 **CANNOT BE ORDERED STAT** [[id number]]  (Normal) Collected: 09/16/24 2251    Order Status: Completed Specimen: Blood from Arm, Left Updated: 09/18/24 1102     Blood Culture No Growth At 24 Hours

## 2024-09-19 NOTE — PROGRESS NOTES
Gastroenterology/Hepatology Progress Note    Patient Name: Nahed Harvey  Age: 48 y.o.  : 1975  MRN: [id number]  Admission Date: 2024      Self, Aaareferral    SUBJECTIVE:     NAEON.  Patient remains NPO this morning.  Reports overall improvement in symptoms with no recent episodes of vomiting but does endorse waves of nausea every few hours.  Also reports 2 loose bowel movements over the last 24 hours as well as continued RLQ abdominal pain.  No other acute complaints at this time.  Labs showing pancytopenia with stable LFT's. Hemodynamically stable, remains on IVF.     Review of patient's allergies indicates:  No Known Allergies       INPATIENT MEDICATIONS    Scheduled Meds:   amLODIPine  5 mg Oral Daily    folic acid  1 mg Oral Daily    hydrALAZINE  25 mg Oral Q8H    HYDROmorphone  0.5 mg Intravenous Once    mupirocin   Nasal BID    nicotine  1 patch Transdermal Daily    pantoprazole  40 mg Intravenous Daily    rivaroxaban  15 mg Oral Daily with dinner    sodium phosphate 30 mmol in D5W 250 mL IVPB  30 mmol Intravenous Once    sucralfate  1 g Oral Q6H     Continuous Infusions:   lactated ringers   Intravenous Continuous 75 mL/hr at 24 0854 Rate Change at 24 0854     PRN Meds:    Current Facility-Administered Medications:     hydrALAZINE, 10 mg, Intravenous, Q6H PRN    melatonin, 6 mg, Oral, Nightly PRN    prochlorperazine, 2.5 mg, Intravenous, Q6H PRN    sodium chloride 0.9%, 10 mL, Intravenous, PRN          Review of Systems:     Constitutional:  Negative for appetite change and unexpected weight change.   HENT:  Negative for trouble swallowing.    Respiratory:  Negative for chest tightness.    Cardiovascular: Negative.    Gastrointestinal:  Positive for constipation, nausea and vomiting. Negative for change in bowel habit.   Musculoskeletal: Negative.    Neurological: Negative.    Psychiatric/Behavioral: Negative.     All other systems reviewed and are negative.    Objective:       VITAL  SIGNS: 24 HR MIN & MAX LAST    Temp  Min: 97.8 °F (36.6 °C)  Max: 99.1 °F (37.3 °C)  97.8 °F (36.6 °C)        BP  Min: 134/92  Max: 163/113  (!) 144/82     Pulse  Min: 66  Max: 82  82     Resp  Min: 16  Max: 18  16    SpO2  Min: 97 %  Max: 99 %  99 %        Physical Exam  Constitutional:       Appearance: Normal appearance. She is obese.   HENT:      Head: Normocephalic and atraumatic.      Mouth/Throat:      Mouth: Mucous membranes are moist.   Eyes:      Conjunctiva/sclera: Conjunctivae normal.   Cardiovascular:      Rate and Rhythm: Normal rate and regular rhythm.   Pulmonary:      Effort: Pulmonary effort is normal.      Breath sounds: Normal breath sounds.   Abdominal:      General: Bowel sounds are normal.      Palpations: Abdomen is soft.   Musculoskeletal:         General: Normal range of motion.      Cervical back: Normal range of motion.   Skin:     General: Skin is warm.   Neurological:      General: No focal deficit present.      Mental Status: She is alert and oriented to person, place, and time. Mental status is at baseline.   Psychiatric:         Mood and Affect: Mood normal.         Behavior: Behavior normal.     LABS:    Recent Labs   Lab 09/16/24 2234 09/17/24  0405 09/18/24  0330 09/19/24  0424   WBC 18.16* 11.86* 7.38 3.55*   HGB 13.4 12.8 10.7* 10.4*   HCT 41.5 36.8* 31.7* 29.2*    102* 76* 58*   .5* 107.9* 101.9* 104.3*       Recent Labs   Lab 09/16/24 2234 09/17/24  0405 09/18/24  0330 09/19/24  0424   HGB 13.4 12.8 10.7* 10.4*   HCT 41.5 36.8* 31.7* 29.2*        Recent Labs   Lab 09/16/24 2234 09/17/24  0200 09/17/24  0236 09/17/24  0715 09/18/24  0330 09/18/24  0657 09/19/24  0424 09/19/24  1116    138  --  139 139  --  137  --    K 5.3* 5.4*  --  4.6 3.5  --  3.0*  --    CO2 6* <5*  --  11* 26  --  28  --    BUN 14.8 13.3  --  13.1 15.3  --  8.0*  --    CREATININE 1.66* 1.44*  --  1.58* 1.43*  --  1.02  --    BILITOT 0.5 0.7  --   --  0.7  --  0.9  --    ALKPHOS 120  105  --   --  69  --  64  --    * 144*  --   --  150*  --  119*  --    * 91*  --   --  79*  --  78*  --    LABPROT 7.5 6.7   < >  --  5.5*   < > 5.1* 16.9*   ALBUMIN 3.8 3.3*  --   --  2.8*  --  2.6*  --     < > = values in this interval not displayed.        Recent Labs   Lab 09/17/24  0236 09/18/24  0657 09/19/24  1116   INR 1.7* 1.4* 1.4*        Recent Labs   Lab 09/19/24  1116   FERRITIN 954.42*            IMAGING:   CT Abdomen Pelvis  Without Contrast    Result Date: 9/17/2024  EXAMINATION: CT ABDOMEN PELVIS WITHOUT CONTRAST CLINICAL HISTORY: Abdominal abscess/infection suspected; TECHNIQUE: Helically acquired axial images, sagittal and coronal reformations were obtained from the lung bases to the pubic symphysis without the IV administration of contrast. Automated tube current modulation, weight-based exposure dosing, and/or iterative reconstruction technique utilized to reach lowest reasonably achievable exposure rate. DLP: 487 mGy*cm COMPARISON: CT chest abdomen pelvis 09/13/2022 FINDINGS: HEART: Normal in size. No pericardial effusion. LUNG BASES: Well aerated. LIVER: Severe hepatic steatosis.  Defer to prior contrast enhanced exam regarding probable hemangiomata and cysts. BILIARY: Gallbladder is not overly distended. PANCREAS: No inflammatory change. SPLEEN: Normal in size ADRENALS: No mass. KIDNEYS/URETERS: Postop left nephrectomy.  No right hydronephrosis.  Nonspecific perinephric stranding. GI TRACT/MESENTERY: Evaluation of the bowel is limited without contrast.  Edema at the lower esophagus.  No evidence of bowel obstruction. PERITONEUM: No free fluid.No free air. LYMPH NODES: No enlarged lymph nodes by size criteria. VASCULATURE: Aortoiliac atherosclerosis. BLADDER: Normal appearance given degree of distention. REPRODUCTIVE ORGANS: Decreased size probable subserosal uterine fibroid. ABDOMINAL WALL: Unremarkable. BONES: No acute osseous abnormality.     1. Edematous appearance of the  lower esophagus 2. Severe hepatic steatosis 3. The preliminary and final reports are concordant. Electronically signed by: Amairani Reyes Date:    09/17/2024 Time:    11:03    CT Chest Abdomen Pelvis With IV Contrast (XPD) Routine Oral Contrast    Result Date: 9/17/2024  EXAMINATION: CT CHEST ABDOMEN PELVIS WITH IV CONTRAST (XPD) CLINICAL HISTORY: Aortic dissection suspected; TECHNIQUE: Helical acquisition with axial, sagittal and coronal reformations obtained from the thoracic inlet to the pubic symphysis following the IV administration of contrast. Automated tube current modulation, weight-based exposure dosing, and/or iterative reconstruction technique utilized to reach lowest reasonably achievable exposure rate. DLP: 382 mGy*cm COMPARISON: CT abdomen pelvis 09/16/2024, CT chest 12/09/2023, CT chest abdomen pelvis 09/13/2022 FINDINGS: BASE OF NECK: No significant abnormality. HEART: Normal size. No effusion. THORACIC VASCULATURE: No aortic aneurysm and no significant atherosclerosis.Pulmonary arteries enhance normally. AGUSTÍN/MEDIASTINUM: The wall of the esophagus appears thickened and edematous with periesophageal fat stranding. AIRWAYS: Patent. LUNGS/PLEURA: No lobar consolidation, pleural effusion or pneumothorax.  Very mild ground-glass density at the lung bases may be related to developing atelectasis or mild pneumonitis. THORACIC SOFT TISSUES: Unremarkable. LIVER: Severe hepatic steatosis.  Probable small hepatic cysts and hemangioma, similar to prior exam. BILIARY: No calcified gallstones. PANCREAS: No inflammatory change. SPLEEN: Normal in size ADRENALS: No mass. KIDNEYS/URETERS: No right hydronephrosis.  Left kidney surgically absent.  No hydronephrosis. GI TRACT/MESENTERY:  No evidence of bowel obstruction.  Colonic diverticulosis without acute inflammatory change. PERITONEUM: No free fluid.No free air. LYMPH NODES: No enlarged lymph nodes by size criteria. ABDOMINOPELVIC VASCULATURE: Mild aortic  atherosclerosis. BLADDER: Normal appearance given degree of distention. REPRODUCTIVE ORGANS: Chronic right adnexal lesion, decreased in size compared to prior exam in 2022.  Lesion currently measures 6 cm; previously 10 cm.  Suspect subserosal fibroid. ABDOMINAL WALL: Unremarkable. BONES: No acute osseous abnormality.     1. Changes suggestive of esophagitis. 2. The preliminary and final reports are concordant. Electronically signed by: Amairani Reyes Date:    09/17/2024 Time:    10:50    X-Ray Chest 1 View    Result Date: 9/17/2024  EXAMINATION: XR CHEST 1 VIEW CPT 63930 CLINICAL HISTORY: shortness of breath; COMPARISON: August 22, 2024 FINDINGS: Examination reveals mediastinal silhouette to be within normal limits cardiac silhouette is not enlarged there is some blunting of the left costophrenic angle which may indicate the presence of left-sided pleural effusion. No focal consolidative changes atelectasis or other effusions identified     Minimal blunting of the left costophrenic angle might indicate a small left-sided pleural effusion. Otherwise no active pulmonary disease Electronically signed by: Yaron Ventura Date:    09/17/2024 Time:    07:47    X-Ray Chest AP Portable    Result Date: 8/22/2024  EXAMINATION: XR CHEST AP PORTABLE CLINICAL HISTORY: , Cough, unspecified. FINDINGS: No alveolar consolidation, effusion, or pneumothorax is seen.   The thoracic aorta is normal  cardiac silhouette is enlarged, central pulmonary vessels and mediastinum are normal in size and are grossly unremarkable.   visualized osseous structures are grossly unremarkable.     No acute chest disease is identified. Mild cardiomegaly Electronically signed by: Yaron Ventura Date:    08/22/2024 Time:    15:37        Assessment / Plan:     Nahed Harvey is a 48 y.o. female with DM, DVT with PTE no Xarelto (IVC filter removed Feb 2024), HTN, obesity, self reported gastroparesis admitted with nausea, vomiting, ANNEL and lactic  acidosis.      Nausea and vomiting:   - Chronic nausea vomiting syndrome possible but can consider other entities such as cyclic vomiting syndrome or cannabinoid hyperemesis syndrome.  Per history, she is not an excessive marijuana user but still a possibility given its use.   - No formal evaluation for gastroparesis has ever been performed, therefore, this diagnosis cannot be confirmed at this time.  Baseline symptoms are not entirely consistent with gastroparesis symptoms.   - Current acute issues are improved  - Anti-emetics prn   - IVFs  - Depending on diagnosis, may benefit more from alternative treatment outpatients other than anti-emetics  - Gastric emptying study would be recommended as outpatient when patient back to baseline  - PPI and sucralfate in the short tem for esophagitis due vomiting seen on CT scan  - Will get EGD report from Dr. Villarreal  - Advance diet as tolerated      2. Hepatic steatosis  - Low concern for chronic liver disease at this time.  Suspect lab abnormalities more related to acute issues (platelets normal prior to admission, INR elevation likely reflective of Xarelto use).  Transaminitis may reflect some degree of steatohepatitis  - Will send PET given alcohol use (although not excessive per history)  - Outpatient follow-up for further work-up including Fibroscan   - If MASLD, depending on degree of fibrosis, may benefit from treatment  - Low suspicion for autoimmune pathology but would recommend ordering GENEVA, Anti-mitochondrial ab, Anti-smooth muscle antibody, ceruloplasmin, IgG, Liver-kidney microsome abs, and alpha-1 antitrypsin for r/o.     Lia Meyer MD  [facility], PGY-3

## 2024-09-19 NOTE — MEDICAL/APP STUDENT
[facility] Internal Medicine  PROGRESS NOTE    Patient's Name: Nahed Harvey  : 1975  MRN: [id number]    Admission Date: 2024  Length of Stay: 2  Attending Physician: Hunter Javed MD  Resident: ***  Intern: ***    SUBJECTIVE     Chief Complaint   Patient presents with    Abdominal Pain     Began vomiting last night around 0000, worsened this evening and also began having lower abdominal pain that she states has since migrated upwards towards chest. 4mg zofran per EMS. Hx gastroparesis    Nausea    Vomiting     History of Present Illness:  48 y.o. female with a past medical history of Gastroparesis, Hypertension, pulmonary embolism in , left-sided nephrectomy for donation in  presents to ED with primary complaint of abdominal pain and worsening N/V for the last 24 hours.  Patient states the pain was initially sharp but is now a dull pain.  Onset was gradual and has worsened since onset.  She states the pain initially started in her right upper abdomen, then it radiated to her right lower abdomen and now to her right chest.   Patient reports a chronic history of nausea with a hx of gastroparesis but states that nausea has worsened within the last day.  She reports multiple bouts of emesis, occurring every 20 minutes.   Patient reports that she has had similar symptoms in the past but is unable to remember what happened at those times due to current fatigue.  She denies any blood in emesis, fever, chills, changes in bowel movement, chest pain, dysuria, increased urinary frequency or urgency, vaginal discharge, cough, sore throat, nasal congestion.   She does report difficulty catching her breath.  She states that she has a history of panic attacks that have been occurring since she was diagnosed with pulmonary embolism in . She is not on any medications for this.   Upon initial presentation to the ED, patient was afebrile, tachycardic to 134, /88, RR 18,  O2 sats 98% on room air.  CBC revealed leukocytosis with WBC of 18.  CMP revealed mild hyperkalemia with potassium of 5.3, AGMA with bicarb 6, chloride 98, ANNEL with creatinine of 1.66, elevated transaminases with ALT/AST of 103/163.  Initial lactic acid was elevated at 21.  CT AP without contrast revealed possible esophagitis and right-sided pyelonephritis.  CT CAP with IV contrast revealed same as above along with gastritis.  Patient received 3.5 L of LR.  Lactic acid was repeated and it was 19.  Internal medicine was consulted for further workup and management of lactic acidosis, esophagitis and pyelonephritis.  Hospital Course/Significant Events:  ***    Interval History: NAEON, hypertensive (systolics 160s), remaining vitals stable. No vomiting but mild nausea and R abdominal discomfort. Odynophagia present when patient is swallowing, she says that sometimes she feels like her throat is closing up. Electrolyte abnormalities:       Review of Systems:  A 12-pt ROS was conducted and was negative unless stated above.    OBJECTIVE     VITAL SIGNS: 24 HR MIN & MAX Most Recent Vitals   Temp  Min: 98.2 °F (36.8 °C)  Max: 99.1 °F (37.3 °C)  98.2 °F (36.8 °C)   BP  Min: 137/93  Max: 163/113  (!) 153/99    Pulse  Min: 66  Max: 80  69   Resp  Min: 16  Max: 18  18    SpO2  Min: 97 %  Max: 99 %  99 %    Body mass index is 28.32 kg/m².    Intake/Output:  I/O last 3 completed shifts:  In: 3508.2 [P.O.:90; I.V.:2632.8; IV Piggyback:785.4]  Out: 500 [Urine:500]  Physical Exam  Constitutional:       Appearance: Normal appearance. She is normal weight.   HENT:      Head: Normocephalic and atraumatic.      Right Ear: External ear normal.      Left Ear: External ear normal.      Nose: Nose normal. No congestion or rhinorrhea.      Mouth/Throat:      Mouth: Mucous membranes are dry.      Pharynx: Oropharynx is clear. No oropharyngeal exudate or posterior oropharyngeal erythema.   Eyes:      General: No scleral icterus.        Right  eye: No discharge.         Left eye: No discharge.      Conjunctiva/sclera: Conjunctivae normal.      Pupils: Pupils are equal, round, and reactive to light.   Cardiovascular:      Rate and Rhythm: Normal rate and regular rhythm.   Pulmonary:      Effort: Pulmonary effort is normal.      Breath sounds: Normal breath sounds.   Abdominal:      General: Bowel sounds are normal.      Palpations: Abdomen is soft.      Tenderness: There is abdominal tenderness (mild discomfort on R side). There is no guarding or rebound.   Skin:     General: Skin is warm and dry.   Neurological:      General: No focal deficit present.      Mental Status: She is alert and oriented to person, place, and time.   Psychiatric:         Mood and Affect: Mood normal.         Judgment: Judgment normal.         Current Medications:  Scheduled:   0.9% NaCl 1,000 mL with mvi, (ADULT) no.4 with vit K 3,300 unit- 150 mcg/10 mL 10 mL, thiamine 100 mg, folic acid 1 mg infusion   Intravenous Once    folic acid  1 mg Oral Daily    hydrALAZINE  25 mg Oral Q8H    HYDROmorphone  0.5 mg Intravenous Once    magnesium sulfate IVPB  2 g Intravenous Once    mupirocin   Nasal BID    nicotine  1 patch Transdermal Daily    pantoprazole  40 mg Intravenous Daily    potassium chloride  10 mEq Intravenous Q1H    rivaroxaban  15 mg Oral Daily with dinner    sucralfate  1 g Oral Q6H      Infusions:   lactated ringers   Intravenous Continuous 125 mL/hr at 09/19/24 0502 New Bag at 09/19/24 0502     PRNs:    Current Facility-Administered Medications:     hydrALAZINE, 10 mg, Intravenous, Q6H PRN    melatonin, 6 mg, Oral, Nightly PRN    prochlorperazine, 2.5 mg, Intravenous, Q6H PRN    sodium chloride 0.9%, 10 mL, Intravenous, PRN  Labs:  CBC:  Recent Labs   Lab 09/17/24  0405 09/18/24  0330 09/19/24  0424   WBC 11.86* 7.38 3.55*   HGB 12.8 10.7* 10.4*   HCT 36.8* 31.7* 29.2*   * 76* 58*   .9* 101.9* 104.3*   RDW 13.9 14.1 13.9     BMP/CMP:  Recent Labs   Lab  09/17/24  0715 09/18/24  0330 09/19/24  0424    139 137   K 4.6 3.5 3.0*   CL 99 102 99   CO2 11* 26 28   BUN 13.1 15.3 8.0*   CREATININE 1.58* 1.43* 1.02   GLUCOSE 181* 112* 86   EGFRNORACEVR 40 45 >60     RFTs/LFTs:  Recent Labs   Lab 09/17/24  0200 09/17/24  0236 09/17/24  0715 09/18/24  0330 09/18/24  0657 09/19/24  0424   CALCIUM 8.6  --  8.4 8.0*  --  7.8*   LABPROT 6.7   < >  --  5.5* 17.5* 5.1*   ALBUMIN 3.3*  --   --  2.8*  --  2.6*   *  --   --  150*  --  119*   ALT 91*  --   --  79*  --  78*   ALKPHOS 105  --   --  69  --  64   BILITOT 0.7  --   --  0.7  --  0.9    < > = values in this interval not displayed.     Recent Labs   Lab 09/17/24  0200 09/18/24  0330 09/19/24  0424   MG 1.50* 1.90 1.60   PHOS 7.2* 1.1* 2.3     Cardiac Panel:  Recent Labs   Lab 09/17/24  0405 09/17/24  1009 09/17/24  1546 09/17/24  2202   TROPONINI 0.143* 0.296* 0.216* 0.109*     Interval Imaging:  CT Abdomen Pelvis  Without Contrast  Narrative: EXAMINATION:  CT ABDOMEN PELVIS WITHOUT CONTRAST    CLINICAL HISTORY:  Abdominal abscess/infection suspected;    TECHNIQUE:  Helically acquired axial images, sagittal and coronal reformations were obtained from the lung bases to the pubic symphysis without the IV administration of contrast.    Automated tube current modulation, weight-based exposure dosing, and/or iterative reconstruction technique utilized to reach lowest reasonably achievable exposure rate.    DLP: 487 mGy*cm    COMPARISON:  CT chest abdomen pelvis 09/13/2022    FINDINGS:  HEART: Normal in size. No pericardial effusion.    LUNG BASES: Well aerated.    LIVER: Severe hepatic steatosis.  Defer to prior contrast enhanced exam regarding probable hemangiomata and cysts.    BILIARY: Gallbladder is not overly distended.    PANCREAS: No inflammatory change.    SPLEEN: Normal in size    ADRENALS: No mass.    KIDNEYS/URETERS: Postop left nephrectomy.  No right hydronephrosis.  Nonspecific perinephric stranding.    GI  TRACT/MESENTERY: Evaluation of the bowel is limited without contrast.  Edema at the lower esophagus.  No evidence of bowel obstruction.    PERITONEUM: No free fluid.No free air.    LYMPH NODES: No enlarged lymph nodes by size criteria.    VASCULATURE: Aortoiliac atherosclerosis.    BLADDER: Normal appearance given degree of distention.    REPRODUCTIVE ORGANS: Decreased size probable subserosal uterine fibroid.    ABDOMINAL WALL: Unremarkable.    BONES: No acute osseous abnormality.  Impression: 1. Edematous appearance of the lower esophagus  2. Severe hepatic steatosis  3. The preliminary and final reports are concordant.    Electronically signed by: Amairani Reyes  Date:    09/17/2024  Time:    11:03  CT Chest Abdomen Pelvis With IV Contrast (XPD) Routine Oral Contrast  Narrative: EXAMINATION:  CT CHEST ABDOMEN PELVIS WITH IV CONTRAST (XPD)    CLINICAL HISTORY:  Aortic dissection suspected;    TECHNIQUE:  Helical acquisition with axial, sagittal and coronal reformations obtained from the thoracic inlet to the pubic symphysis following the IV administration of contrast.    Automated tube current modulation, weight-based exposure dosing, and/or iterative reconstruction technique utilized to reach lowest reasonably achievable exposure rate.    DLP: 382 mGy*cm    COMPARISON:  CT abdomen pelvis 09/16/2024, CT chest 12/09/2023, CT chest abdomen pelvis 09/13/2022    FINDINGS:  BASE OF NECK: No significant abnormality.    HEART: Normal size. No effusion.    THORACIC VASCULATURE: No aortic aneurysm and no significant atherosclerosis.Pulmonary arteries enhance normally.    AGUSTÍN/MEDIASTINUM: The wall of the esophagus appears thickened and edematous with periesophageal fat stranding.    AIRWAYS: Patent.    LUNGS/PLEURA: No lobar consolidation, pleural effusion or pneumothorax.  Very mild ground-glass density at the lung bases may be related to developing atelectasis or mild pneumonitis.    THORACIC SOFT TISSUES:  Unremarkable.    LIVER: Severe hepatic steatosis.  Probable small hepatic cysts and hemangioma, similar to prior exam.    BILIARY: No calcified gallstones.    PANCREAS: No inflammatory change.    SPLEEN: Normal in size    ADRENALS: No mass.    KIDNEYS/URETERS: No right hydronephrosis.  Left kidney surgically absent.  No hydronephrosis.    GI TRACT/MESENTERY:  No evidence of bowel obstruction.  Colonic diverticulosis without acute inflammatory change.    PERITONEUM: No free fluid.No free air.    LYMPH NODES: No enlarged lymph nodes by size criteria.    ABDOMINOPELVIC VASCULATURE: Mild aortic atherosclerosis.    BLADDER: Normal appearance given degree of distention.    REPRODUCTIVE ORGANS: Chronic right adnexal lesion, decreased in size compared to prior exam in 2022.  Lesion currently measures 6 cm; previously 10 cm.  Suspect subserosal fibroid.    ABDOMINAL WALL: Unremarkable.    BONES: No acute osseous abnormality.  Impression: 1. Changes suggestive of esophagitis.  2. The preliminary and final reports are concordant.    Electronically signed by: Amairani Reyes  Date:    09/17/2024  Time:    10:50  X-Ray Chest 1 View  Narrative: EXAMINATION:  XR CHEST 1 VIEW    CPT 52762    CLINICAL HISTORY:  shortness of breath;    COMPARISON:  August 22, 2024    FINDINGS:  Examination reveals mediastinal silhouette to be within normal limits cardiac silhouette is not enlarged there is some blunting of the left costophrenic angle which may indicate the presence of left-sided pleural effusion.    No focal consolidative changes atelectasis or other effusions identified  Impression: Minimal blunting of the left costophrenic angle might indicate a small left-sided pleural effusion.    Otherwise no active pulmonary disease    Electronically signed by: Yaron Ventura  Date:    09/17/2024  Time:    07:47     Microbiology:  Microbiology Results (last 7 days)       Procedure Component Value Units Date/Time    Blood Culture #2 **CANNOT  BE ORDERED STAT** [[id number]]  (Normal) Collected: 09/16/24 2251    Order Status: Completed Specimen: Blood from Hand, Right Updated: 09/18/24 1102     Blood Culture No Growth At 24 Hours    Blood Culture #1 **CANNOT BE ORDERED STAT** [[id number]]  (Normal) Collected: 09/16/24 2251    Order Status: Completed Specimen: Blood from Arm, Left Updated: 09/18/24 1102     Blood Culture No Growth At 24 Hours          Antibiotics:  Antibiotics (From admission, onward)      Start     Stop Route Frequency Ordered    09/17/24 0915  mupirocin 2 % ointment         09/22/24 0859 Nasl 2 times daily 09/17/24 0802             ASSESSMENT AND PLAN   ***  - ***      DVT Prophylaxis: ***  GI Prophylaxis: ***  Abx: ***  Access: ***  Fluids: ***  Diet: Diet NPO Except for: Sips with Medication    Code Status: Full Code    Disposition: ***     Janeen Mcguire, MS4  Case was discussed with  ***. Please appreciate attending's attestation to follow.      [facility] Internal Medicine Team 1  09/19/2024

## 2024-09-19 NOTE — PLAN OF CARE
Problem: Infection  Goal: Absence of Infection Signs and Symptoms  Outcome: Progressing     Problem: Adult Inpatient Plan of Care  Goal: Plan of Care Review  Outcome: Progressing  Goal: Patient-Specific Goal (Individualized)  Outcome: Progressing  Goal: Absence of Hospital-Acquired Illness or Injury  Outcome: Progressing  Goal: Optimal Comfort and Wellbeing  Outcome: Progressing  Goal: Readiness for Transition of Care  Outcome: Progressing     Problem: Sepsis/Septic Shock  Goal: Optimal Coping  Outcome: Progressing  Goal: Absence of Bleeding  Outcome: Progressing  Goal: Blood Glucose Level Within Targeted Range  Outcome: Progressing  Goal: Absence of Infection Signs and Symptoms  Outcome: Progressing  Goal: Optimal Nutrition Intake  Outcome: Progressing     Problem: Comorbidity Management  Goal: Blood Pressure in Desired Range  Outcome: Progressing     Problem: Acute Kidney Injury/Impairment  Goal: Fluid and Electrolyte Balance  Outcome: Progressing  Goal: Improved Oral Intake  Outcome: Progressing  Goal: Effective Renal Function  Outcome: Progressing     Problem: Pain Acute  Goal: Optimal Pain Control and Function  Outcome: Progressing     Problem: Nausea and Vomiting  Goal: Nausea and Vomiting Relief  Outcome: Progressing

## 2024-09-19 NOTE — PROGRESS NOTES
Inpatient Nutrition Assessment    Admit Date: 9/16/2024   Total duration of encounter: 3 days   Patient Age: 48 y.o.    Nutrition Recommendation/Prescription     Full liquid diet--When appropriate--ADAT to Low Na  Will order boost breeze tid; Boost Breeze (provides 250 kcal, 9 g protein per serving)   Continue pantoprazole/sucralfate--GI issues   MVI/fe  Biweekly wt  Will monitor nutrition status     Communication of Recommendations: reviewed with nurse and reviewed with patient    Nutrition Assessment     Malnutrition Assessment/Nutrition-Focused Physical Exam    Malnutrition Context: chronic illness (09/17/24 1131)  Malnutrition Level: other (see comments) (does not meet malnutrition criteria) (09/17/24 1131)  Energy Intake (Malnutrition): less than 75% for greater than or equal to 1 month (09/17/24 1131)  Weight Loss (Malnutrition):  (does not meet criteria) (09/17/24 1131)     Orbital Region (Subcutaneous Fat Loss): well nourished  Upper Arm Region (Subcutaneous Fat Loss): well nourished        Caodaism Region (Muscle Loss): well nourished  Clavicle Bone Region (Muscle Loss): well nourished         A minimum of two characteristics is recommended for diagnosis of either severe or non-severe malnutrition.    Chart Review    Reason Seen: continuous nutrition monitoring and follow-up    Malnutrition Screening Tool Results   Have you recently lost weight without trying?: Unsure  Have you been eating poorly because of a decreased appetite?: Yes   MST Score: 3   Diagnosis:  Sepsis, lactic acidosis, ? R pyelonephritis, esophagitis, leukocytosis, AGMA, N/V, gastroparesis, ANNEL, HTN, Hx PE     Relevant Medical History: gastroparesis, HTN, PE, L side nephrectomy     Scheduled Medications:  amLODIPine, 5 mg, Daily  folic acid, 1 mg, Daily  hydrALAZINE, 25 mg, Q8H  HYDROmorphone, 0.5 mg, Once  mupirocin, , BID  nicotine, 1 patch, Daily  pantoprazole, 40 mg, Daily  rivaroxaban, 15 mg, Daily with dinner  sodium phosphate 30 mmol  in D5W 250 mL IVPB, 30 mmol, Once  sucralfate, 1 g, Q6H    Continuous Infusions:  lactated ringers, Last Rate: 75 mL/hr at 09/19/24 0854    PRN Medications:  hydrALAZINE, 10 mg, Q6H PRN  melatonin, 6 mg, Nightly PRN  prochlorperazine, 2.5 mg, Q6H PRN  sodium chloride 0.9%, 10 mL, PRN    Calorie Containing IV Medications: no significant kcals from medications at this time    Recent Labs   Lab 09/16/24  2234 09/17/24  0200 09/17/24  0405 09/17/24  0430 09/17/24  0715 09/18/24  0330 09/19/24  0424 09/19/24  1116    138  --   --  139 139 137  --    K 5.3* 5.4*  --   --  4.6 3.5 3.0*  --    CALCIUM 9.2 8.6  --   --  8.4 8.0* 7.8*  --    PHOS 9.0* 7.2*  --   --   --  1.1* 2.3  --    MG 1.80 1.50*  --   --   --  1.90 1.60  --    CL 98 99  --   --  99 102 99  --    CO2 6* <5*  --   --  11* 26 28  --    BUN 14.8 13.3  --   --  13.1 15.3 8.0*  --    CREATININE 1.66* 1.44*  --   --  1.58* 1.43* 1.02  --    EGFRNORACEVR 37 44  --   --  40 45 >60  --    GLUCOSE 63* 64*  --   --  181* 112* 86  --    BILITOT 0.5 0.7  --   --   --  0.7 0.9  --    ALKPHOS 120 105  --   --   --  69 64  --    * 91*  --   --   --  79* 78*  --    * 144*  --   --   --  150* 119*  --    ALBUMIN 3.8 3.3*  --   --   --  2.8* 2.6*  --    CRP  --   --   --  3.70  --   --   --   --    TRIG  --   --   --   --   --   --   --  62   HGBA1C  --   --  4.6  --   --   --   --   --    WBC 18.16*  --  11.86*  --   --  7.38 3.55*  --    HGB 13.4  --  12.8  --   --  10.7* 10.4*  --    HCT 41.5  --  36.8*  --   --  31.7* 29.2*  --      Nutrition Orders:  Diet Full Liquid      Appetite/Oral Intake: fair/50-75% of meals  Factors Affecting Nutritional Intake: altered gastrointestinal function, decreased appetite, nausea, and vomiting  Social Needs Impacting Access to Food: none identified  Food/Latter-day/Cultural Preferences: none reported  Food Allergies: none reported  Last Bowel Movement: 09/18/24  Wound(s):  none    Comments  (9/19) Pt diet progressed  "to full liquids; pt reported N/V symptoms improving; had BM yesterday; abdominal pain improved; GI recommended--pantoprazole/sucralfate for Gi issues. Will order boost breeze supplement for added nutrition; discussed low fat/small frequent meals to help pt meet nutrient needs. No new wt. LFTs remain elevated.     () Pt remains NPO; admitted with N/V/abdominal pain x 1-2 days; still having N/V; hx gastroparesis--pt reported has difficulty with stomach weekly; on reglan for GI motility. Appetite---fair --up/down PTA; depending on stomach. No recent wt loss . Suggest use ONS when diet progressed and continue Reglan. Will monitor diet progression/tolerance. Labs acknowledged: Gluc (H)--no reported hx DM>     Anthropometrics    Height: 5' 4" (162.6 cm), Height Method: Stated  Last Weight: 74.8 kg (165 lb) (24), Weight Method: Bed Scale  BMI (Calculated): 28.3  BMI Classification: overweight (BMI 25-29.9)     Ideal Body Weight (IBW), Female: 120 lb     % Ideal Body Weight, Female (lb): 137.5 %         Usual Body Weight (UBW), k.1 kg  % Usual Body Weight: 104.02     Usual Weight Provided By: patient and EMR weight history    Wt Readings from Last 5 Encounters:   24 74.8 kg (165 lb)   24 75 kg (165 lb 5.5 oz)   24 72.1 kg (159 lb)   24 72.3 kg (159 lb 6.4 oz)   24 72.2 kg (159 lb 2.8 oz)     Weight Change(s) Since Admission: no wt loss   Wt Readings from Last 1 Encounters:   24 74.8 kg (165 lb)   Admit Weight: 74.8 kg (165 lb) (24), Weight Method: Estimated    Estimated Needs    Weight Used For Calorie Calculations: 74.8 kg (164 lb 14.5 oz)  Energy Calorie Requirements (kcal): 1870 kcal/d; 25 gianna/kg  Energy Need Method: Kcal/kg  Weight Used For Protein Calculations: 74.8 kg (164 lb 14.5 oz)  Protein Requirements: 89 gm protein/d; 1.2 gm/kg  Fluid Requirements (mL): 1870 ml/d; 1mlcal        Enteral Nutrition     Patient not receiving enteral nutrition at " this time.    Parenteral Nutrition     Patient not receiving parenteral nutrition support at this time.    Evaluation of Received Nutrient Intake    Calories: not meeting estimated needs; diet progressed/ oral intake increasng   Protein: not meeting estimated needs    Patient Education     Not applicable.    Nutrition Diagnosis     PES: Inadequate oral intake related to chronic illness as evidenced by  N/V/hx eating 75% or less . (active)       Nutrition Interventions     Intervention(s): modified composition of meals/snacks, commercial beverage, multivitamin/mineral supplement therapy, and collaboration with other providers    Goal: Meet greater than 80% of nutritional needs by follow-up. (goal progressing)  Goal: Maintain weight throughout hospitalization. (goal progressing)    Nutrition Goals & Monitoring     Dietitian will monitor: food and beverage intake, weight, glucose/endocrine profile, and gastrointestinal profile  Discharge planning: continue low Na diet with boost oral supplements  Nutrition Risk/Follow-Up: moderate (follow-up in 3-5 days)   Please consult if re-assessment needed sooner.

## 2024-09-20 VITALS
HEART RATE: 96 BPM | BODY MASS INDEX: 28.17 KG/M2 | SYSTOLIC BLOOD PRESSURE: 138 MMHG | HEIGHT: 64 IN | DIASTOLIC BLOOD PRESSURE: 101 MMHG | WEIGHT: 165 LBS | OXYGEN SATURATION: 99 % | TEMPERATURE: 98 F | RESPIRATION RATE: 18 BRPM

## 2024-09-20 PROBLEM — E53.8 FOLATE DEFICIENCY: Status: ACTIVE | Noted: 2024-09-20

## 2024-09-20 LAB
ALBUMIN SERPL-MCNC: 2.7 G/DL (ref 3.5–5)
ALBUMIN/GLOB SERPL: 1 RATIO (ref 1.1–2)
ALP SERPL-CCNC: 70 UNIT/L (ref 40–150)
ALT SERPL-CCNC: 69 UNIT/L (ref 0–55)
ANA PAT SER IF-IMP: ABNORMAL
ANA SER QL HEP2 SUBST: ABNORMAL
ANA TITR SER HEP2 SUBST: ABNORMAL {TITER}
ANION GAP SERPL CALC-SCNC: 10 MEQ/L
ANION GAP SERPL CALC-SCNC: 11 MEQ/L
AST SERPL-CCNC: 96 UNIT/L (ref 5–34)
BASOPHILS # BLD AUTO: 0.03 X10(3)/MCL
BASOPHILS NFR BLD AUTO: 0.9 %
BILIRUB SERPL-MCNC: 0.9 MG/DL
BUN SERPL-MCNC: 3.8 MG/DL (ref 9.8–20.1)
BUN SERPL-MCNC: 4.2 MG/DL (ref 9.8–20.1)
CALCIUM SERPL-MCNC: 8.1 MG/DL (ref 8.4–10.2)
CALCIUM SERPL-MCNC: 8.8 MG/DL (ref 8.4–10.2)
CHLORIDE SERPL-SCNC: 101 MMOL/L (ref 98–107)
CHLORIDE SERPL-SCNC: 103 MMOL/L (ref 98–107)
CO2 SERPL-SCNC: 23 MMOL/L (ref 22–29)
CO2 SERPL-SCNC: 27 MMOL/L (ref 22–29)
CREAT SERPL-MCNC: 0.82 MG/DL (ref 0.55–1.02)
CREAT SERPL-MCNC: 0.84 MG/DL (ref 0.55–1.02)
CREAT/UREA NIT SERPL: 5
CREAT/UREA NIT SERPL: 5
EOSINOPHIL # BLD AUTO: 0.08 X10(3)/MCL (ref 0–0.9)
EOSINOPHIL NFR BLD AUTO: 2.3 %
ERYTHROCYTE [DISTWIDTH] IN BLOOD BY AUTOMATED COUNT: 14.1 % (ref 11.5–17)
GFR SERPLBLD CREATININE-BSD FMLA CKD-EPI: >60 ML/MIN/1.73/M2
GFR SERPLBLD CREATININE-BSD FMLA CKD-EPI: >60 ML/MIN/1.73/M2
GLOBULIN SER-MCNC: 2.6 GM/DL (ref 2.4–3.5)
GLUCOSE SERPL-MCNC: 122 MG/DL (ref 74–100)
GLUCOSE SERPL-MCNC: 98 MG/DL (ref 74–100)
HCT VFR BLD AUTO: 30 % (ref 37–47)
HGB BLD-MCNC: 10.6 G/DL (ref 12–16)
IMM GRANULOCYTES # BLD AUTO: 0.01 X10(3)/MCL (ref 0–0.04)
IMM GRANULOCYTES NFR BLD AUTO: 0.3 %
LYMPHOCYTES # BLD AUTO: 1.46 X10(3)/MCL (ref 0.6–4.6)
LYMPHOCYTES NFR BLD AUTO: 41.7 %
MAGNESIUM SERPL-MCNC: 1.7 MG/DL (ref 1.6–2.6)
MCH RBC QN AUTO: 36.6 PG (ref 27–31)
MCHC RBC AUTO-ENTMCNC: 35.3 G/DL (ref 33–36)
MCV RBC AUTO: 103.4 FL (ref 80–94)
MONOCYTES # BLD AUTO: 0.31 X10(3)/MCL (ref 0.1–1.3)
MONOCYTES NFR BLD AUTO: 8.9 %
NEUTROPHILS # BLD AUTO: 1.61 X10(3)/MCL (ref 2.1–9.2)
NEUTROPHILS NFR BLD AUTO: 45.9 %
NRBC BLD AUTO-RTO: 0 %
PHOSPHATE SERPL-MCNC: 2.9 MG/DL (ref 2.3–4.7)
PLATELET # BLD AUTO: 63 X10(3)/MCL (ref 130–400)
PLATELETS.RETICULATED NFR BLD AUTO: 5.2 % (ref 0.9–11.2)
PMV BLD AUTO: 11.1 FL (ref 7.4–10.4)
POTASSIUM SERPL-SCNC: 2.9 MMOL/L (ref 3.5–5.1)
POTASSIUM SERPL-SCNC: 3.9 MMOL/L (ref 3.5–5.1)
PROT C ACT/NOR PPP CHRO: 43 % (ref 70–150)
PROT SERPL-MCNC: 5.3 GM/DL (ref 6.4–8.3)
RBC # BLD AUTO: 2.9 X10(6)/MCL (ref 4.2–5.4)
SODIUM SERPL-SCNC: 137 MMOL/L (ref 136–145)
SODIUM SERPL-SCNC: 138 MMOL/L (ref 136–145)
VANCOMYCIN TROUGH SERPL-MCNC: 10.1 UG/ML (ref 15–20)
WBC # BLD AUTO: 3.5 X10(3)/MCL (ref 4.5–11.5)

## 2024-09-20 PROCEDURE — 86015 ACTIN ANTIBODY EACH: CPT

## 2024-09-20 PROCEDURE — 97162 PT EVAL MOD COMPLEX 30 MIN: CPT

## 2024-09-20 PROCEDURE — 84100 ASSAY OF PHOSPHORUS: CPT

## 2024-09-20 PROCEDURE — 25000003 PHARM REV CODE 250

## 2024-09-20 PROCEDURE — 80202 ASSAY OF VANCOMYCIN: CPT | Performed by: STUDENT IN AN ORGANIZED HEALTH CARE EDUCATION/TRAINING PROGRAM

## 2024-09-20 PROCEDURE — 80053 COMPREHEN METABOLIC PANEL: CPT

## 2024-09-20 PROCEDURE — 83516 IMMUNOASSAY NONANTIBODY: CPT

## 2024-09-20 PROCEDURE — 63600175 PHARM REV CODE 636 W HCPCS

## 2024-09-20 PROCEDURE — 82787 IGG 1 2 3 OR 4 EACH: CPT

## 2024-09-20 PROCEDURE — 82542 COL CHROMOTOGRAPHY QUAL/QUAN: CPT

## 2024-09-20 PROCEDURE — 83735 ASSAY OF MAGNESIUM: CPT

## 2024-09-20 PROCEDURE — 86376 MICROSOMAL ANTIBODY EACH: CPT

## 2024-09-20 PROCEDURE — 85025 COMPLETE CBC W/AUTO DIFF WBC: CPT

## 2024-09-20 PROCEDURE — 36415 COLL VENOUS BLD VENIPUNCTURE: CPT

## 2024-09-20 PROCEDURE — 82390 ASSAY OF CERULOPLASMIN: CPT

## 2024-09-20 PROCEDURE — 94761 N-INVAS EAR/PLS OXIMETRY MLT: CPT

## 2024-09-20 RX ORDER — MAGNESIUM SULFATE HEPTAHYDRATE 40 MG/ML
2 INJECTION, SOLUTION INTRAVENOUS
Status: COMPLETED | OUTPATIENT
Start: 2024-09-20 | End: 2024-09-20

## 2024-09-20 RX ORDER — SUCRALFATE 1 G/10ML
1 SUSPENSION ORAL EVERY 6 HOURS
Qty: 473 ML | Refills: 11 | Status: SHIPPED | OUTPATIENT
Start: 2024-09-20

## 2024-09-20 RX ORDER — PANTOPRAZOLE SODIUM 40 MG/1
40 TABLET, DELAYED RELEASE ORAL DAILY
Qty: 90 TABLET | Refills: 3 | Status: SHIPPED | OUTPATIENT
Start: 2024-09-20 | End: 2025-09-20

## 2024-09-20 RX ORDER — POTASSIUM CHLORIDE 7.45 MG/ML
10 INJECTION INTRAVENOUS
Status: COMPLETED | OUTPATIENT
Start: 2024-09-20 | End: 2024-09-20

## 2024-09-20 RX ORDER — POTASSIUM CHLORIDE 20 MEQ/1
40 TABLET, EXTENDED RELEASE ORAL ONCE
Status: COMPLETED | OUTPATIENT
Start: 2024-09-20 | End: 2024-09-20

## 2024-09-20 RX ADMIN — HYDRALAZINE HYDROCHLORIDE 25 MG: 25 TABLET ORAL at 05:09

## 2024-09-20 RX ADMIN — SUCRALFATE 1 G: 1 SUSPENSION ORAL at 12:09

## 2024-09-20 RX ADMIN — MUPIROCIN: 20 OINTMENT TOPICAL at 09:09

## 2024-09-20 RX ADMIN — POTASSIUM CHLORIDE 40 MEQ: 1500 TABLET, EXTENDED RELEASE ORAL at 07:09

## 2024-09-20 RX ADMIN — MAGNESIUM SULFATE HEPTAHYDRATE 2 G: 40 INJECTION, SOLUTION INTRAVENOUS at 12:09

## 2024-09-20 RX ADMIN — POTASSIUM CHLORIDE 10 MEQ: 7.46 INJECTION, SOLUTION INTRAVENOUS at 09:09

## 2024-09-20 RX ADMIN — AMLODIPINE BESYLATE 5 MG: 5 TABLET ORAL at 08:09

## 2024-09-20 RX ADMIN — MAGNESIUM SULFATE HEPTAHYDRATE 2 G: 40 INJECTION, SOLUTION INTRAVENOUS at 09:09

## 2024-09-20 RX ADMIN — POTASSIUM CHLORIDE 10 MEQ: 7.46 INJECTION, SOLUTION INTRAVENOUS at 07:09

## 2024-09-20 RX ADMIN — POTASSIUM CHLORIDE 10 MEQ: 7.46 INJECTION, SOLUTION INTRAVENOUS at 11:09

## 2024-09-20 RX ADMIN — POTASSIUM CHLORIDE 10 MEQ: 7.46 INJECTION, SOLUTION INTRAVENOUS at 01:09

## 2024-09-20 RX ADMIN — IBUPROFEN 800 MG: 400 TABLET, FILM COATED ORAL at 12:09

## 2024-09-20 RX ADMIN — FOLIC ACID 1 MG: 1 TABLET ORAL at 08:09

## 2024-09-20 RX ADMIN — SUCRALFATE 1 G: 1 SUSPENSION ORAL at 05:09

## 2024-09-20 RX ADMIN — HYDRALAZINE HYDROCHLORIDE 10 MG: 20 INJECTION INTRAMUSCULAR; INTRAVENOUS at 09:09

## 2024-09-20 RX ADMIN — PANTOPRAZOLE SODIUM 40 MG: 40 INJECTION, POWDER, FOR SOLUTION INTRAVENOUS at 08:09

## 2024-09-20 NOTE — PLAN OF CARE
Problem: Infection  Goal: Absence of Infection Signs and Symptoms  Outcome: Met     Problem: Adult Inpatient Plan of Care  Goal: Plan of Care Review  Outcome: Met  Goal: Patient-Specific Goal (Individualized)  Outcome: Met  Goal: Absence of Hospital-Acquired Illness or Injury  Outcome: Met  Goal: Optimal Comfort and Wellbeing  Outcome: Met  Goal: Readiness for Transition of Care  Outcome: Met     Problem: Sepsis/Septic Shock  Goal: Optimal Coping  Outcome: Met  Goal: Absence of Bleeding  Outcome: Met  Goal: Blood Glucose Level Within Targeted Range  Outcome: Met  Goal: Absence of Infection Signs and Symptoms  Outcome: Met  Goal: Optimal Nutrition Intake  Outcome: Met     Problem: Comorbidity Management  Goal: Blood Pressure in Desired Range  Outcome: Met     Problem: Acute Kidney Injury/Impairment  Goal: Fluid and Electrolyte Balance  Outcome: Met  Goal: Improved Oral Intake  Outcome: Met  Goal: Effective Renal Function  Outcome: Met     Problem: Pain Acute  Goal: Optimal Pain Control and Function  Outcome: Met     Problem: Nausea and Vomiting  Goal: Nausea and Vomiting Relief  Outcome: Met     Problem: Fall Injury Risk  Goal: Absence of Fall and Fall-Related Injury  Outcome: Met

## 2024-09-20 NOTE — PLAN OF CARE
Problem: Infection  Goal: Absence of Infection Signs and Symptoms  Outcome: Progressing     Problem: Adult Inpatient Plan of Care  Goal: Plan of Care Review  Outcome: Progressing  Goal: Patient-Specific Goal (Individualized)  Outcome: Progressing  Goal: Absence of Hospital-Acquired Illness or Injury  Outcome: Progressing  Goal: Optimal Comfort and Wellbeing  Outcome: Progressing  Goal: Readiness for Transition of Care  Outcome: Progressing     Problem: Sepsis/Septic Shock  Goal: Optimal Coping  Outcome: Progressing  Goal: Absence of Bleeding  Outcome: Progressing  Goal: Blood Glucose Level Within Targeted Range  Outcome: Progressing  Goal: Absence of Infection Signs and Symptoms  Outcome: Progressing  Goal: Optimal Nutrition Intake  Outcome: Progressing     Problem: Comorbidity Management  Goal: Blood Pressure in Desired Range  Outcome: Progressing     Problem: Acute Kidney Injury/Impairment  Goal: Fluid and Electrolyte Balance  Outcome: Progressing  Goal: Improved Oral Intake  Outcome: Progressing  Goal: Effective Renal Function  Outcome: Progressing     Problem: Pain Acute  Goal: Optimal Pain Control and Function  Outcome: Progressing     Problem: Nausea and Vomiting  Goal: Nausea and Vomiting Relief  Outcome: Progressing     Problem: Fall Injury Risk  Goal: Absence of Fall and Fall-Related Injury  Outcome: Progressing

## 2024-09-20 NOTE — PT/OT/SLP EVAL
Physical Therapy Evaluation & Discharge Note    Patient Name:  Nahed Harvey   MRN:  [id number]    Recommendations     Therapy Intensity Recommendations at Discharge: No Therapy Indicated  Discharge Equipment Recommendations: none   Equipment to be obtained for discharge: none.  Barriers to discharge: none    Assessment     Nahed Harvey is a 48 y.o. female admitted with a medical diagnosis of Sepsis.  1. Lactic acidosis    2. Pyelonephritis    3. Tachycardia    4. Elevated troponin       Patient Active Problem List   Diagnosis    Bilateral non-suppurative otitis media    Sepsis    Pyelonephritis    Lactic acidosis    Tobacco dependency      She presents with the following impairments/functional limitations:   (none from PT SERVICES standpoint).    Patient was seen by therapy for evaluation only.    Patient discharged to 'in-house' with independent and walking program    Patient's current level of function is at baseline (PLOF).    Recent Surgery: * No surgery found *      Plan     Patient discharged from acute PT Services on 09/20/24.    Based on current functional levels observed, patient does not require further acute PT services.    Re-consult PT Services if patient's status changes and therapy services warranted.    Subjective     Communicated with patient's nurse Aysha prior to session.    Patient agreeable to participate in evaluation.     Chief Complaint: Lt lateral chest/upper abdominal discomfort  Patient/Family Comments/goals: home today  Pain/Comfort:  Pain Rating 1:  (per patient-hurts a little)  Location - Side 1: Left  Location - Orientation 1: lateral  Location 1:  (chest/upper abdominal)  Pain Rating Post-Intervention 1:  (per patient-hurts a little)    Patients cultural, spiritual, Mormonism conflicts given the current situation: no    Social History  Living Environment: Patient lives with spouse in a single level home, with no steps, with tub-shower combo.  Functional Level: Prior to admission  patient was employed, was driving, ambulated without assistive device, was independent in ADL's, and required assistance with IADL's including yard work.  Equipment Used at Home: none (patient has access to - rolling walker, straight cane, bedside commode, shower chair)  Equipment owned (not currently used): rolling walker, straight cane, shower chair, bedside commode.  Assistance Upon Discharge: spouse.    Hand dominance: right    Patient denied lightheadedness/dizziness.  Patient complaints of extremity tingling/numbness.  -bilat anterior thigh numbness  -bilat feet numbness.  Patient denied current swallowing difficulty.  Patient denied 'new' vision impairment.     Objective     Patient found supine in bed, with HOB elevated, and with bed rails up bilateral HOB with telemetry, pulse ox (continuous), peripheral IV  upon PT entry to room.    General Precautions: Standard  Orthopedic Precautions:N/A   Braces: N/A  Respiratory Status: room air    Vitals   At Rest (pre-session)  BP  170/94   HR  128   O2 Sat %  99     With Activity (post-session)  BP  147/110   HR  121   O2 Sat %  99     With Activity (post-session) - repeated  BP  148/103   HR  115   O2 Sat %  99   *patients nurse Aysha notified of BP above    Exams:  Orientation: Patient is oriented to person, place, time, situation  Commands: Patient follows multi-step verbal commands  Fine Motor Coordination:     -     Intact: LLE heel shin, RLE heel shin, Rapid alternating ankle DF/PF, Left hand thumb/finger opposition skills, and Right hand thumb/finger opposition skills  Sensation:    -     Intact: light/touch bilat upper extremity  -     Impaired: light/touch bilat lower extremity - anterior thigh-absent light touch & intact to pressure & bilat feet-impaired to light touch  RUE ROM: WFL  RUE Strength: WFL  LUE ROM: WFL  LUE Strength: WFL  RLE ROM: WFL  RLE Strength: WFL  LLE ROM: WFL  LLE Strength: WFL    Functional Mobility:    Bed Mobility:  Rolling Left:  independence  Rolling Right: independence  Scooting: independence  Supine to Sit: independence  Sit to Supine: independence  Repositioning to head-of-bed: independence  Repositioning to center-of-bed: independence  with no cues required    Transfers:  Sit to Stand: independence with no assistive device  with no cues required    Gait:  Patient ambulated 260ft  Sitting rest x4 minutes (student nurse and instructor into room for medication dispensing  Patient ambulated 260ft  with no assistive device and independence.  Patient demonstrates :       steady gait       symmetrical step length       upright posture       reciprocal arm swing       no loss of balance       no mis-steps.    Other Mobility:  N/A    Balance:  Sit  Patient demonstrated static balance on level surface with independence with no verbal cues.  Patient demonstrated dynamic balance on level surface with independence with no verbal cues during maximal excursions.  Stand  Patient demonstrated static balance on level surface  using no device with independence with no verbal cues.    Additional Treatment Session  N/A    Patient left supine in bed, with HOB elevated, and with bed rails up bilateral HOB and right FOB with telemetry, pulse ox (continuous), peripheral IV with all lines intact, call button in reach, tray table at bedside, and patient's nurse notified.    Education     Patient educated on the importance of early mobility to prevent functional decline during hospital stay.  Patient educated on and assisted with functional mobility as noted above.  Patient educated on PT Plan of Care and role of PT in acute care.  Patient was instructed to utilize staff assistance for mobility/transfers.  White board updated regarding patient's safest level of mobility with staff assistance    Goals - No STG's or LTG's established secondary to patient was seen for evaluation and discharge     Multidisciplinary Problems       Physical Therapy Goals       Not on  file        History     Past Medical History:   Diagnosis Date    Hypertension      Past Surgical History:   Procedure Laterality Date    INSERTION OF INFERIOR VENA CAVAL FILTER N/A 9/16/2022    Procedure: Insertion, Filter, Inferior Vena Cava;  Surgeon: Juancarlos Victoria MD;  Location: [facility] CATH LAB;  Service: Cardiology;  Laterality: N/A;    IVC FILTER RETRIEVAL N/A 2/7/2024    Procedure: REMOVAL, FILTER, INFERIOR VENA CAVA;  Surgeon: Juancarlos Victoria MD;  Location: [facility] CATH LAB;  Service: Cardiology;  Laterality: N/A;  IVC FILTER REMOVAL    NEPHRECTOMY      PERCUTANEOUS MECHANICAL THROMBECTOMY OF VASCULAR GRAFT OF UPPER EXTREMITY  9/14/2022    Procedure: THROMBECTOMY, MECHANICAL, VASCULAR GRAFT, UPPER EXTREMITY, PERCUTANEOUS;  Surgeon: Juancarlos Victoria MD;  Location: [facility] CATH LAB;  Service: Cardiology;;    PHLEBOGRAPHY  9/16/2022    Procedure: VENOGRAM;  Surgeon: Juancarlos Victoria MD;  Location: [facility] CATH LAB;  Service: Cardiology;;    THROMBECTOMY  9/14/2022    Procedure: THROMBECTOMY;  Surgeon: Juancarlos Victoria MD;  Location: [facility] CATH LAB;  Service: Cardiology;;     Time Tracking     PT Received On: 09/20/24  PT Start Time: 0829     PT Stop Time: 0906  PT Total Time (min): 37 min     Billable Minutes: Evaluation 37  Non-Billable Minutes: N/A  09/20/2024

## 2024-09-20 NOTE — DISCHARGE SUMMARY
[facility] Discharge Summary    Admitting Physician: Hunter Javed MD  Attending Physician: Hunter Javed MD  Date of Admit: 9/16/2024  Date of Discharge: 9/20/2024    Condition: Stable  Outcome: Condition has improved and patient is now ready for discharge.  DISPOSITION: Home or Self Care    Discharge Diagnoses     Patient Active Problem List   Diagnosis    Bilateral non-suppurative otitis media    Sepsis    Pyelonephritis    Lactic acidosis    Tobacco dependency    Folate deficiency       Principal Problem:  Lactic acidosis    Consultants and Procedures     Consultants:  Consults (From admission, onward)          Status Ordering Provider     Inpatient consult to Gastroenterology  Once        Provider:  Mechelle Metzger MD    Completed LINDEN SUTTON     Inpatient consult to Hospitalist  Once        Provider:  Leeroy Felipe MD    Acknowledged TRUMAN HODGES             Procedures:   * No surgery found *     Brief History of Present Illness      48 y.o. female with a past medical history of Gastroparesis, Hypertension, pulmonary embolism in 2022, left-sided nephrectomy for donation in 2006 presents to ED with primary complaint of abdominal pain and worsening N/V for the last 24 hours.  Patient states the pain was initially sharp but is now a dull pain.  Onset was gradual and has worsened since onset.  She states the pain initially started in her right upper abdomen, then it radiated to her right lower abdomen and now to her right chest.   Patient reports a chronic history of nausea with a hx of gastroparesis but states that nausea has worsened within the last day.  She reports multiple bouts of emesis, occurring every 20 minutes.   Patient reports that she has had similar symptoms in the past but is unable to remember what happened at those times due to current fatigue.  She denies any blood in emesis, fever, chills, changes in bowel movement, chest pain, dysuria, increased urinary  frequency or urgency, vaginal discharge, cough, sore throat, nasal congestion.   She does report difficulty catching her breath.  She states that she has a history of panic attacks that have been occurring since she was diagnosed with pulmonary embolism in 2022. She is not on any medications for this.      Social history: Social drinker.  Reports smoking about 2-3 cigarettes daily. Denies recreational drug use.     ED course:  Upon initial presentation to the ED, patient was afebrile, tachycardic to 134, /88, RR 18, O2 sats 98% on room air.  CBC revealed leukocytosis with WBC of 18.  CMP revealed mild hyperkalemia with potassium of 5.3, AGMA with bicarb 6, chloride 98, ANNEL with creatinine of 1.66, elevated transaminases with ALT/AST of 103/163.  Initial lactic acid was elevated at 21.  CT AP without contrast revealed possible esophagitis and right-sided pyelonephritis.  CT CAP with IV contrast revealed same as above along with gastritis.  Patient received 3.5 L of LR.  Lactic acid was repeated and it was 19.  Internal medicine was consulted for further workup and management of lactic acidosis, esophagitis and pyelonephritis.       Hospital Course with Pertinent Findings     Patient admitted for intractable nausea and vomiting, ANNEL, anion gap metabolic acidosis with an initial gap of 40, bicarb 6 (initial lactate 21.1, downtrended to 1.7; ABG revealed 7.2/19/143/6).  Nausea and vomiting subsided and by the time of discharge patient was able to tolerate soft and bite sized meals.  ANNEL resolved with IV fluids.  Anion gap resolved.  During stay patient became progressively pancytopenic.  Folate undetectable and became replacement.  Workup for coagulopathy and liver disease began and pending at time of discharge.  Of note homocystine above 50, C3 48 normal C4.  Hepatitis HIV syphilis all negative.  Imaging studies described below.  EGD from outside provider uploaded media.  Patient discharged with Carafate Protonix  a gastric emptying study and follow-up with both PCP and gastroenterology.    Lab Results   Component Value Date     09/20/2024    K 2.9 (L) 09/20/2024     09/20/2024    CO2 27 09/20/2024    GLU 89 03/12/2008    BUN 3.8 (L) 09/20/2024    CREATININE 0.82 09/20/2024    CALCIUM 8.1 (L) 09/20/2024    PROT 5.7 (L) 02/12/2008    BILIDIR 0.2 09/18/2020    IBILI 0.20 09/18/2020    ALKPHOS 70 09/20/2024    AST 96 (H) 09/20/2024    ALT 69 (H) 09/20/2024    MG 1.70 09/20/2024    PHOS 2.9 09/20/2024        Lab Results   Component Value Date    WBC 3.50 (L) 09/20/2024    RBC 2.90 (L) 09/20/2024    HGB 10.6 (L) 09/20/2024    HCT 30.0 (L) 09/20/2024    .4 (H) 09/20/2024    MCH 36.6 (H) 09/20/2024    MCHC 35.3 09/20/2024    RDW 14.1 09/20/2024    PLT 63 (L) 09/20/2024    MPV 11.1 (H) 09/20/2024    GRAN 3.4 03/12/2008    GRAN 54.7 03/12/2008    LYMPH 38.8 03/12/2008    LYMPH 2.4 03/12/2008    MONO 4.8 03/12/2008    MONO 0.3 03/12/2008    EOS 0.1 03/12/2008    BASO 0.0 03/12/2008    EOSINOPHIL 1.1 03/12/2008    BASOPHIL 0.6 03/12/2008         Microbiology Data:  Microbiology Results (last 7 days)       Procedure Component Value Units Date/Time    Blood Culture #1 **CANNOT BE ORDERED STAT** [[id number]]  (Normal) Collected: 09/16/24 2251    Order Status: Completed Specimen: Blood from Arm, Left Updated: 09/20/24 1102     Blood Culture No Growth At 72 Hours    Blood Culture #2 **CANNOT BE ORDERED STAT** [[id number]]  (Normal) Collected: 09/16/24 5738    Order Status: Completed Specimen: Blood from Hand, Right Updated: 09/20/24 1102     Blood Culture No Growth At 72 Hours            Cardiac Data:  No echocardiogram results found for the past 14 days.    Results for orders placed or performed during the hospital encounter of 09/16/24   EKG 12-lead    Collection Time: 09/17/24 10:03 AM   Result Value Ref Range    QRS Duration 70 ms    OHS QTC Calculation 465 ms    Narrative    Test Reason : R79.89,    Vent. Rate : 104  BPM     Atrial Rate : 104 BPM     P-R Int : 148 ms          QRS Dur : 070 ms      QT Int : 354 ms       P-R-T Axes : 045 001 040 degrees     QTc Int : 465 ms    Sinus tachycardia  Otherwise normal ECG  Confirmed by Padilla Faria MD ([id number]) on 9/17/2024 1:24:37 PM    Referred By: [email or web address]   SELF           Confirmed By:Padilla Faria MD        Radiology:  Imaging Results              X-Ray Chest 1 View (Final result)  Result time 09/17/24 07:47:11      Final result by Yaron Ventura MD (09/17/24 07:47:11)                   Impression:      Minimal blunting of the left costophrenic angle might indicate a small left-sided pleural effusion.    Otherwise no active pulmonary disease      Electronically signed by: Yaron Ventura  Date:    09/17/2024  Time:    07:47               Narrative:    EXAMINATION:  XR CHEST 1 VIEW    CPT 62985    CLINICAL HISTORY:  shortness of breath;    COMPARISON:  August 22, 2024    FINDINGS:  Examination reveals mediastinal silhouette to be within normal limits cardiac silhouette is not enlarged there is some blunting of the left costophrenic angle which may indicate the presence of left-sided pleural effusion.    No focal consolidative changes atelectasis or other effusions identified                                       CT Chest Abdomen Pelvis With IV Contrast (XPD) Routine Oral Contrast (Final result)  Result time 09/17/24 10:50:21      Final result by Amairani Reyes MD (09/17/24 10:50:21)                   Impression:      1. Changes suggestive of esophagitis.  2. The preliminary and final reports are concordant.      Electronically signed by: Amairani Reyes  Date:    09/17/2024  Time:    10:50               Narrative:    EXAMINATION:  CT CHEST ABDOMEN PELVIS WITH IV CONTRAST (XPD)    CLINICAL HISTORY:  Aortic dissection suspected;    TECHNIQUE:  Helical acquisition with axial, sagittal and coronal reformations obtained from the thoracic inlet to the pubic symphysis following the IV  administration of contrast.    Automated tube current modulation, weight-based exposure dosing, and/or iterative reconstruction technique utilized to reach lowest reasonably achievable exposure rate.    DLP: 382 mGy*cm    COMPARISON:  CT abdomen pelvis 09/16/2024, CT chest 12/09/2023, CT chest abdomen pelvis 09/13/2022    FINDINGS:  BASE OF NECK: No significant abnormality.    HEART: Normal size. No effusion.    THORACIC VASCULATURE: No aortic aneurysm and no significant atherosclerosis.Pulmonary arteries enhance normally.    AGUSTÍN/MEDIASTINUM: The wall of the esophagus appears thickened and edematous with periesophageal fat stranding.    AIRWAYS: Patent.    LUNGS/PLEURA: No lobar consolidation, pleural effusion or pneumothorax.  Very mild ground-glass density at the lung bases may be related to developing atelectasis or mild pneumonitis.    THORACIC SOFT TISSUES: Unremarkable.    LIVER: Severe hepatic steatosis.  Probable small hepatic cysts and hemangioma, similar to prior exam.    BILIARY: No calcified gallstones.    PANCREAS: No inflammatory change.    SPLEEN: Normal in size    ADRENALS: No mass.    KIDNEYS/URETERS: No right hydronephrosis.  Left kidney surgically absent.  No hydronephrosis.    GI TRACT/MESENTERY:  No evidence of bowel obstruction.  Colonic diverticulosis without acute inflammatory change.    PERITONEUM: No free fluid.No free air.    LYMPH NODES: No enlarged lymph nodes by size criteria.    ABDOMINOPELVIC VASCULATURE: Mild aortic atherosclerosis.    BLADDER: Normal appearance given degree of distention.    REPRODUCTIVE ORGANS: Chronic right adnexal lesion, decreased in size compared to prior exam in 2022.  Lesion currently measures 6 cm; previously 10 cm.  Suspect subserosal fibroid.    ABDOMINAL WALL: Unremarkable.    BONES: No acute osseous abnormality.                        Preliminary result by Bakari Solorio MD (09/17/24 01:14:17)                   Impression:    1. There is diffuse wall  thickening of the collapsed esophagus with associated mucosal thickening consistent with esophagitis.  2. There is diffuse gastric wall thickening with associated mucosal wall thickening which may represent an element of gastritis.  3. There is a 2.6 cm peripherally enhancing ovoid hypodensity along anterior myometrium centered on series 2 image 173. Correlate clinically as regards additional evaluation and follow-up.  4. Details and other findings as discussed above.               Narrative:    START OF REPORT:  Technique: CT Scan of the chest abdomen and pelvis was performed with intravenous contrast with axial as well as sagittal and, coronal images.    Dosage Information: Automated Exposure Control was utilized.    Comparison: Comparison is with study dated 2024-09-16 23:04:03.    Clinical History: Abdominal Pain(Began vomiting last night around 0000, worsened this evening and also began having lower abdominal pain that she states has since migrated upwards towards chest. 4mg zofran per EMS. Hx gastroparesis) Nausea Vomiting.    Findings:  Soft Tissues: Unremarkable.  Lines and Tubes: None.  Neck: The visualized soft tissues of the neck appear unremarkable.  Mediastinum: There is diffuse wall thickening of the collapsed esophagus with associated mucosal thickening consistent with esophagitis.  Heart: Mild cardiomegaly is seen.  Aorta: Unremarkable appearing aorta. No aortic dissection or aneurysm is seen.  Pulmonary Arteries: No filling defects are seen in the pulmonary arteries to suggest pulmonary embolus to the sensitivity of the study.  Lungs: Mild streaky linear opacity is seen consistent with scarring subsegmental atelectasis. No focal infiltrate or consolidation identified.  Pleura: No effusions or pneumothorax are identified.  Bony Structures:  Spine: Mild spondylolytic changes are seen in the thoracic spine.  Ribs: The ribs appear unremarkable.  Abdomen:  Abdominal Wall: No abdominal wall pathology is  seen.  Liver: Pronounced fatty infiltration of the liver is present. There is a 1.8 cm focal area of fat sparing in the lateral right hepatic lobe on series 2 image 93 also seen in the non contrast prior study. There is better delineation of a subcentimeter cyst in the inferolateral right hepatic lobe.  Biliary System: No intrahepatic or extrahepatic biliary duct dilatation is seen.  Gallbladder: The gallbladder appears unremarkable.  Pancreas: The pancreas appears unremarkable.  Spleen: The spleen appears unremarkable.  Adrenals: The adrenal glands appear unremarkable.  Kidneys: The right kidney appears unremarkable with no stones cysts masses or hydronephrosis. Nonspecific perinephric fat stranding is noted on the right. Surgical clips are seen in the left renal bed which may reflect prior nephrectomy, correlate with surgical history.  Aorta: The abdominal aorta appears unremarkable.  IVC: Unremarkable.  Bowel:  Stomach: There is diffuse gastric wall thickening with associated mucosal wall thickening which may represent an element of gastritis.  Duodenum: Unremarkable appearing duodenum.  Small Bowel: The small bowel appears unremarkable.  Colon: There is moderate stool in the descending and sigmoid colon which could reflect an element of constipation. A few diverticula are seen in the transverse colon. No associated inflammatory stranding is seen to suggest diverticulitis.  Appendix: The appendix is not identified but no inflammatory changes are seen in the right lower quadrant to suggest appendicitis.  Peritoneum: No intraperitoneal free air or ascites is seen.    Pelvis:  Bladder: The bladder appears unremarkable.  Female:  Uterus: There is a 2.6 cm peripherally enhancing ovoid hypodensity along anterior myometrium centered on series 2 image 173.  Ovaries: No adnexal masses are seen.    Bony structures:  Dorsal Spine: There is mild spondylosis of the visualized dorsal spine.  Bony Pelvis: The visualized bony  structures of the pelvis appear unremarkable.                                         CT Abdomen Pelvis  Without Contrast (Final result)  Result time 09/17/24 11:03:54      Final result by Amairani Reyes MD (09/17/24 11:03:54)                   Impression:      1. Edematous appearance of the lower esophagus  2. Severe hepatic steatosis  3. The preliminary and final reports are concordant.      Electronically signed by: Amairani Reyes  Date:    09/17/2024  Time:    11:03               Narrative:    EXAMINATION:  CT ABDOMEN PELVIS WITHOUT CONTRAST    CLINICAL HISTORY:  Abdominal abscess/infection suspected;    TECHNIQUE:  Helically acquired axial images, sagittal and coronal reformations were obtained from the lung bases to the pubic symphysis without the IV administration of contrast.    Automated tube current modulation, weight-based exposure dosing, and/or iterative reconstruction technique utilized to reach lowest reasonably achievable exposure rate.    DLP: 487 mGy*cm    COMPARISON:  CT chest abdomen pelvis 09/13/2022    FINDINGS:  HEART: Normal in size. No pericardial effusion.    LUNG BASES: Well aerated.    LIVER: Severe hepatic steatosis.  Defer to prior contrast enhanced exam regarding probable hemangiomata and cysts.    BILIARY: Gallbladder is not overly distended.    PANCREAS: No inflammatory change.    SPLEEN: Normal in size    ADRENALS: No mass.    KIDNEYS/URETERS: Postop left nephrectomy.  No right hydronephrosis.  Nonspecific perinephric stranding.    GI TRACT/MESENTERY: Evaluation of the bowel is limited without contrast.  Edema at the lower esophagus.  No evidence of bowel obstruction.    PERITONEUM: No free fluid.No free air.    LYMPH NODES: No enlarged lymph nodes by size criteria.    VASCULATURE: Aortoiliac atherosclerosis.    BLADDER: Normal appearance given degree of distention.    REPRODUCTIVE ORGANS: Decreased size probable subserosal uterine fibroid.    ABDOMINAL WALL:  Unremarkable.    BONES: No acute osseous abnormality.                        Preliminary result by Bakari Solorio MD (09/16/24 23:39:42)                   Impression:    1. There appears to be mild thickening versus underdistention of the distal esophagus. Correlate clinically as regards possible reflux esophagitis.  2. There is mildly prominent perinephric stranding on the right. The right kidney otherwise appears unremarkable with no stones cysts masses or hydronephrosis. Correlate with clinical and laboratory findings as regards possible right-sided pyelonephritis.  3. No definite acute intraabdominal or pelvic solid organ or bowel pathology identified on this noncontrast scan. Details and other findings as discussed above.               Narrative:    START OF REPORT:  Technique: CT of the abdomen and pelvis was performed with axial images as well as sagittal and coronal reconstruction images without intravenous contrast.    Comparison: None available.    Clinical History: Abdominal Pain(Began vomiting last night around 0000, worsened this evening and also began having lower abdominal pain that she states has since migrated upwards towards chest. 4mg zofran per EMS. Hx gastroparesis) Nausea Vomiting.    Dosage Information: Automated Exposure Control was utilized.    Findings:  Lines and Tubes: None.  Thorax:  Lungs: The visualized lung bases appear unremarkable.  Pleura: No effusions or thickening are seen. No pneumothorax is seen in the visualized lung bases.  Heart: The heart size is within normal limits.  Abdomen:  Abdominal Wall: No abdominal wall pathology is seen.  Liver: Pronounced fatty infiltration of the liver is present. The liver otherwise appears unremarkable.  Biliary System: No intrahepatic or extrahepatic biliary duct dilatation is seen.  Gallbladder: Moderate hyperdensity is seen in the gallbladder which may represent sludge. The gallbladder otherwise appears unremarkable.  Pancreas: The  pancreas appears unremarkable.  Spleen: The spleen appears unremarkable.  Adrenals: The adrenal glands appear unremarkable.  Kidneys: Post-nephrectomy changes are seen in the left. There is mildly prominent perinephric stranding on the right. The right kidney otherwise appears unremarkable with no stones cysts masses or hydronephrosis.  Aorta: There is minimal calcification of the abdominal aorta and its branches.  Bowel:  Esophagus: There appears to be mild thickening versus underdistention of the distal esophagus.  Stomach: The stomach appears unremarkable.  Duodenum: Unremarkable appearing duodenum.  Small Bowel: The small bowel appears unremarkable.  Colon: Nondistended. There is moderate stool in the colon which could reflect an element of constipation.  Appendix: The appendix appears unremarkable and is seen on Image 81, Series 2 through Image 83, Series 2.  Peritoneum: No intraperitoneal free air or ascites is seen.    Pelvis:  Bladder: The bladder appears unremarkable.  Female:  Uterus: The uterus appears prominent with a lobulated contour. This is suggestive of uterine myomas.  Ovaries: No adnexal masses are seen.    Bony structures:  Dorsal Spine: There is mild multilevel spondylosis of the visualized dorsal spine.  Bony Pelvis: The visualized bony structures of the pelvis appear unremarkable.                                         Discharge physical exam:  Vitals:    09/20/24 1111   BP: (!) 138/101   Pulse: 96   Resp: 18   Temp: 98.4 °F (36.9 °C)       GEN: A&Ox4. No acute distress   HEENT: normocephalic, atraumatic. PERRLA. EMOI bilaterally. Sclera, conjunctiva clear. Nares patents. Oropharyngeal mucosa moist, non-erythematous, non-edematous, uvula midline. Neck supple without LAD   CARDIAC: S1 S2, no MRG. Radial pulses full, no LE edema   RESPI: Chest wall rise symmetric, atraumatic. Lungs CTAB, no wheezing or rhonchi   ABDOMEN: soft, nontender, BS present in all 4 quadrants. No herniation, masses,  HSM  : deferred   MSK: ROM grossly intact.   NEURO: Motor and sensation grossly intact. CN grossly intact. No cerebellar deficits noted. No gait abnormalities noted.   PSYCH: Memory and thought process appear intact. Appropriate mood and affect. No AI/HI. No HI/SI .       TIME SPENT ON DISCHARGE: 60 minutes    Discharge Medications        Medication List        START taking these medications      multivitamin Tab  Take 1 tablet by mouth once daily.     pantoprazole 40 MG tablet  Commonly known as: PROTONIX  Take 1 tablet (40 mg total) by mouth once daily.     sucralfate 100 mg/mL suspension  Commonly known as: CARAFATE  Take 10 mLs (1 g total) by mouth every 6 (six) hours.            CONTINUE taking these medications      allopurinoL 100 MG tablet  Commonly known as: ZYLOPRIM     buPROPion 300 MG 24 hr tablet  Commonly known as: WELLBUTRIN XL     cetirizine 5 MG tablet  Commonly known as: ZYRTEC     diphenhydrAMINE 50 MG capsule  Commonly known as: BENADRYL     fluticasone propionate 50 mcg/actuation nasal spray  Commonly known as: FLONASE     furosemide 20 MG tablet  Commonly known as: LASIX  TAKE 1 TABLET BY MOUTH EVERY DAY     hydrALAZINE 25 MG tablet  Commonly known as: APRESOLINE  Take 1 tablet (25 mg total) by mouth every 8 (eight) hours.     MAGNESIUM ORAL     metoprolol tartrate 50 MG tablet  Commonly known as: LOPRESSOR     omeprazole 20 MG capsule  Commonly known as: PRILOSEC     ondansetron 4 MG Tbdl  Commonly known as: ZOFRAN-ODT  Take 1 tablet (4 mg total) by mouth every 8 (eight) hours as needed (nausea/vomiting).     progesterone 200 MG capsule  Commonly known as: PROMETRIUM     promethazine 12.5 MG Tab  Commonly known as: PHENERGAN     rivaroxaban 10 mg Tab  Commonly known as: XARELTO  Take 2 tablets (20 mg total) by mouth daily with dinner or evening meal.     valACYclovir 1000 MG tablet  Commonly known as: VALTREX     VITAMIN B-12 1000 MCG tablet  Generic drug: cyanocobalamin            STOP  taking these medications      famotidine 20 MG tablet  Commonly known as: PEPCID               Where to Get Your Medications        These medications were sent to [facility] [place], [place] - [address] [place] [address]  [address] [facility] [address]      Phone: [phone]   multivitamin Tab  pantoprazole 40 MG tablet  sucralfate 100 mg/mL suspension       Discharge Information:     Mr. Nahed Harvey is being discharged .    Discharge Procedure Orders   NM Gastric Emptying   Standing Status: Future Standing Exp. Date: 09/20/25     Order Specific Question Answer Comments   May the Radiologist modify the order per protocol to meet the clinical needs of the patient? Yes    Release to patient Immediate      Ambulatory referral/consult to Internal Medicine   Standing Status: Future   Referral Priority: Routine Referral Type: Consultation   Referral Reason: Specialty Services Required   Requested Specialty: Internal Medicine   Number of Visits Requested: 1        Follow-Up Appointments:   Follow-up Information       Brandon Harrison MD Follow up in 1 week(s).    Specialties: Pulmonary Disease, Internal Medicine  Contact information:  [address] [id number] [phone]               Jamarcus Villarreal MD Follow up in 1 week(s).    Specialty: Gastroenterology  Contact information:  [address] [place] [id number]  [phone]                               To address at follow-up:  -The following labs are to be drawn at the [facility] visit: CBC CMP   -The following imaging studies are to be ordered at the [facility] visit: Gastric Empty Study     The above information was discussed with the patient in clear terms. He was able to repeat the instructions to me in his own words. All questions answered. ED precautions provided.        Siva Pena DO  Internal Medicine - PGY-2

## 2024-09-20 NOTE — PT/OT/SLP PROGRESS
Physical Therapy    Missed Treatment Session - cancel note    Patient Name:  Nahed Harvey   MRN:  [id number]      -patient not seen at this time secondary to patient not available  -patient supine in bed w/ HOB elevated w/ breakfast tray  -session cx'ed  -will follow-up as patient is appropriate/available/agreeable to participate and as therapists' schedule allows.

## 2024-09-20 NOTE — PLAN OF CARE
Problem: Infection  Goal: Absence of Infection Signs and Symptoms  9/19/2024 1924 by Birgit Gustafson RN  Outcome: Progressing  9/19/2024 1900 by Birgit uGstafson RN  Outcome: Progressing     Problem: Sepsis/Septic Shock  Goal: Optimal Coping  9/19/2024 1924 by Birgit Gustafson RN  Outcome: Progressing  9/19/2024 1900 by Birgit Gustafson RN  Outcome: Progressing  Goal: Absence of Bleeding  9/19/2024 1924 by Birgit Gustafson RN  Outcome: Progressing  9/19/2024 1900 by Birgit Gustafson RN  Outcome: Progressing  Goal: Blood Glucose Level Within Targeted Range  9/19/2024 1924 by Birgit Gustafson RN  Outcome: Progressing  9/19/2024 1900 by Birgit Gustafson RN  Outcome: Progressing  Goal: Absence of Infection Signs and Symptoms  9/19/2024 1924 by Birgit Gustafson RN  Outcome: Progressing  9/19/2024 1900 by Birgit Gustafson RN  Outcome: Progressing  Goal: Optimal Nutrition Intake  9/19/2024 1924 by Birgit Gustafson RN  Outcome: Progressing  9/19/2024 1900 by Birgit Gustafson RN  Outcome: Progressing     Problem: Comorbidity Management  Goal: Blood Pressure in Desired Range  9/19/2024 1924 by Birgit Gustafson RN  Outcome: Progressing  9/19/2024 1900 by Birgit Gustafson RN  Outcome: Progressing     Problem: Acute Kidney Injury/Impairment  Goal: Fluid and Electrolyte Balance  9/19/2024 1924 by Birgit Gustafson RN  Outcome: Progressing  9/19/2024 1900 by Birgit Gustafson RN  Outcome: Progressing  Goal: Improved Oral Intake  9/19/2024 1924 by Birgit Gustafson RN  Outcome: Progressing  9/19/2024 1900 by Birgit Gustafson RN  Outcome: Progressing  Goal: Effective Renal Function  9/19/2024 1924 by Birgit Gustafson RN  Outcome: Progressing  9/19/2024 1900 by Birgit Gustafson RN  Outcome: Progressing

## 2024-09-21 LAB
AT III ACT/NOR PPP CHRO: 49 % (ref 80–130)
C-ANCA TITR SER IF: NEGATIVE {TITER}
LPA SERPL-SCNC: <7 NMOL/L
M2 QUANT (OHS): 0.8 U/ML
P-ANCA SER QL IF: ABNORMAL

## 2024-09-22 LAB
BACTERIA BLD CULT: NORMAL
BACTERIA BLD CULT: NORMAL
CERULOPLASMIN SERPL-MCNC: 15.9 MG/DL (ref 20–51)

## 2024-09-23 ENCOUNTER — PATIENT MESSAGE (OUTPATIENT)
Dept: ADMINISTRATIVE | Facility: OTHER | Age: 49
End: 2024-09-23
Payer: MEDICAID

## 2024-09-23 ENCOUNTER — PATIENT MESSAGE (OUTPATIENT)
Dept: ADMINISTRATIVE | Facility: HOSPITAL | Age: 49
End: 2024-09-23
Payer: MEDICAID

## 2024-09-23 LAB
COAGULATION SPECIALIST REVIEW: NORMAL
F5 P.R506Q BLD/T QL: NEGATIVE
MIXING STUDIES PPP-IMP: NORMAL
PROVIDER SIGNING NAME: NORMAL
SCREEN DRVVT/NORMAL: 0.99 RATIO
SMOOTH MUSCLE AB SER QL IF: NEGATIVE

## 2024-09-24 ENCOUNTER — OFFICE VISIT (OUTPATIENT)
Dept: INTERNAL MEDICINE | Facility: CLINIC | Age: 49
End: 2024-09-24
Payer: MEDICAID

## 2024-09-24 VITALS
HEART RATE: 92 BPM | BODY MASS INDEX: 26.53 KG/M2 | TEMPERATURE: 98 F | HEIGHT: 64 IN | DIASTOLIC BLOOD PRESSURE: 74 MMHG | SYSTOLIC BLOOD PRESSURE: 104 MMHG | RESPIRATION RATE: 18 BRPM | OXYGEN SATURATION: 98 % | WEIGHT: 155.38 LBS

## 2024-09-24 DIAGNOSIS — N12 PYELONEPHRITIS: ICD-10-CM

## 2024-09-24 DIAGNOSIS — Z12.11 SCREENING FOR COLON CANCER: ICD-10-CM

## 2024-09-24 DIAGNOSIS — F41.1 GAD (GENERALIZED ANXIETY DISORDER): ICD-10-CM

## 2024-09-24 DIAGNOSIS — E87.20 LACTIC ACIDOSIS: ICD-10-CM

## 2024-09-24 DIAGNOSIS — A02.1 SEPSIS DUE TO SALMONELLA SPECIES WITHOUT ACUTE ORGAN DYSFUNCTION: ICD-10-CM

## 2024-09-24 DIAGNOSIS — H65.93 BILATERAL NON-SUPPURATIVE OTITIS MEDIA: Primary | ICD-10-CM

## 2024-09-24 DIAGNOSIS — F17.200 TOBACCO DEPENDENCY: ICD-10-CM

## 2024-09-24 DIAGNOSIS — E53.8 FOLATE DEFICIENCY: ICD-10-CM

## 2024-09-24 LAB
A1AT PHENOTYP SERPL-IMP: NORMAL
A1AT SERPL NEPH-MCNC: 113 MG/DL (ref 100–190)
LKM-1 AB SER-ACNC: <5 U

## 2024-09-24 PROCEDURE — 99215 OFFICE O/P EST HI 40 MIN: CPT | Mod: PBBFAC | Performed by: INTERNAL MEDICINE

## 2024-09-24 RX ORDER — ALPRAZOLAM 0.25 MG/1
0.25 TABLET ORAL 3 TIMES DAILY PRN
Qty: 30 TABLET | Refills: 0 | Status: SHIPPED | OUTPATIENT
Start: 2024-09-24

## 2024-09-24 RX ORDER — SODIUM PICOSULFATE, MAGNESIUM OXIDE, AND ANHYDROUS CITRIC ACID 12; 3.5; 1 G/175ML; G/175ML; MG/175ML
LIQUID ORAL
COMMUNITY
Start: 2024-04-08 | End: 2024-09-24

## 2024-09-24 RX ORDER — RIVAROXABAN 20 MG/1
20 TABLET, FILM COATED ORAL DAILY
COMMUNITY
Start: 2024-09-12

## 2024-09-24 NOTE — PROGRESS NOTES
Subjective     Patient ID: Nahed Harvey is a 48 y.o. female.    Chief Complaint: Follow-up ([facility] 9/20/2024)    Follow-up      Patient was admitted to the hospital 1 week ago with severe lactic acidosis nausea and vomiting and was critically ill.  Diagnosis is still unclear she says that she has nausea and vomiting about once a week when she vomits she feels better this has been going on for 10 years occasionally she will have a few days of nausea and vomiting but it resolves this time it did not improve and she went to the hospital lactic acid was extremely high anion gap was extremely high she was presented in her case conference in the morning further investigation is ongoing abnormalities to now include decreased ceruloplasmin elevated homocystine level very low folate level decreased C3 high ferritin decreased protein C decreased antithrombin 3 GENEVA positive 320 P Anca was intermediate    Past history remarkable for pulmonary embolus and left calf DVT 2 years ago    She is having anxiety since our plan is to decrease her Wellbutrin from 300-150 mg daily begin Xanax b.i.d. p.r.n. refer to Dr. [facility] for anxiety follow up in 1 month as scheduled continue investigation it should be noted she has a gastric emptying study scheduled for 10 16th 24 she does have a diagnosis of gastroparesis although this has been brought into question  Review of Systems   All other systems reviewed and are negative.         Objective     Physical Exam  Vitals and nursing note reviewed.   Constitutional:       Appearance: Normal appearance.   HENT:      Head: Normocephalic and atraumatic.      Right Ear: Tympanic membrane, ear canal and external ear normal.      Left Ear: Tympanic membrane, ear canal and external ear normal.      Nose: Nose normal.      Mouth/Throat:      Mouth: Mucous membranes are dry.      Pharynx: Oropharynx is clear.   Eyes:      Extraocular Movements: Extraocular movements intact.       Conjunctiva/sclera: Conjunctivae normal.      Pupils: Pupils are equal, round, and reactive to light.   Cardiovascular:      Rate and Rhythm: Normal rate and regular rhythm.      Pulses: Normal pulses.      Heart sounds: Normal heart sounds.   Pulmonary:      Effort: Pulmonary effort is normal.      Breath sounds: Normal breath sounds.   Abdominal:      General: Bowel sounds are normal.      Palpations: Abdomen is soft.   Musculoskeletal:      Cervical back: Normal range of motion and neck supple.   Skin:     General: Skin is warm and dry.   Neurological:      General: No focal deficit present.      Mental Status: She is alert and oriented to person, place, and time. Mental status is at baseline.   Psychiatric:         Mood and Affect: Mood normal.         Behavior: Behavior normal.         Thought Content: Thought content normal.         Judgment: Judgment normal.            Assessment and Plan     1. Bilateral non-suppurative otitis media    2. Lactic acidosis  -     Ambulatory referral/consult to Internal Medicine    3. Sepsis due to Salmonella species without acute organ dysfunction    4. Pyelonephritis    5. Folate deficiency    6. Tobacco dependency    7. KIERRA (generalized anxiety disorder)  -     ALPRAZolam (XANAX) 0.25 MG tablet; Take 1 tablet (0.25 mg total) by mouth 3 (three) times daily as needed for Anxiety.  Dispense: 30 tablet; Refill: 0  -     Ambulatory referral/consult to Psychiatry; Future; Expected date: 10/08/2024        As above thank you time equaled 65 minutes    Abnormalities in hospital : Sanjuana 1:320; p-anca intermediate; c3 48; antithrombin 49; prot C 43; chronic adnexal mass 6 cm is decrease from 10 cm that appeasrs to be subserosal fibrid seen back to 2007; testosterone 199.9; Anion gap 40; bicarb <5; Lactic acid >20; inr 1.7; mcv 112; wbc 18; creat 1.66; ast/alt 103/163; platelets 63; folate low; increased homocstein level; ceruloplasm  15.9    Will get new labs - see orders         Follow  up in about 7 weeks (around 11/12/2024).

## 2024-09-25 LAB
IGG4 SER-MCNC: 50.1 MG/DL (ref 2.4–121)
PLPETH BLD-MCNC: 1225 NG/ML
POPETH BLD-MCNC: 1785 NG/ML
TOXICOLOGIST REVIEW: NORMAL

## 2024-09-26 ENCOUNTER — TELEPHONE (OUTPATIENT)
Dept: INTERNAL MEDICINE | Facility: CLINIC | Age: 49
End: 2024-09-26
Payer: MEDICAID

## 2024-09-26 DIAGNOSIS — D68.61 APL (ANTIPHOSPHOLIPID SYNDROME): Primary | ICD-10-CM

## 2024-09-26 DIAGNOSIS — R79.89 HIGH SERUM TESTOSTERONE: ICD-10-CM

## 2024-09-27 DIAGNOSIS — E83.01 WILSON'S DISEASE: ICD-10-CM

## 2024-09-27 DIAGNOSIS — T78.3XXS ANGIOEDEMA, SEQUELA: ICD-10-CM

## 2024-09-27 DIAGNOSIS — D59.5 PNH (PAROXYSMAL NOCTURNAL HEMOGLOBINURIA): ICD-10-CM

## 2024-09-27 DIAGNOSIS — R10.9 ABDOMINAL PAIN, UNSPECIFIED ABDOMINAL LOCATION: Primary | ICD-10-CM

## 2024-09-27 NOTE — PROGRESS NOTES
Consider porphyria, pnh, abdominal migraine, fmf, angioedema, other reare diseases - apls, vasculitis, heidy's, connective tissue diseases, neoplasms, amyloid

## 2024-10-11 DIAGNOSIS — F41.1 GAD (GENERALIZED ANXIETY DISORDER): ICD-10-CM

## 2024-10-16 ENCOUNTER — HOSPITAL ENCOUNTER (OUTPATIENT)
Dept: RADIOLOGY | Facility: HOSPITAL | Age: 49
Discharge: HOME | End: 2024-10-16
Payer: MEDICAID

## 2024-10-16 DIAGNOSIS — E87.20 LACTIC ACIDOSIS: ICD-10-CM

## 2024-10-16 PROCEDURE — A9541 TC99M SULFUR COLLOID: HCPCS

## 2024-10-16 PROCEDURE — 78264 GASTRIC EMPTYING IMG STUDY: CPT | Mod: TC

## 2024-10-16 RX ORDER — TECHNETIUM TC 99M SULFUR COLLOID 2 MG
2 KIT MISCELLANEOUS
Status: COMPLETED | OUTPATIENT
Start: 2024-10-16 | End: 2024-10-16

## 2024-10-16 RX ADMIN — TECHNETIUM TC 99M SULFUR COLLOID 2.1 MILLICURIE: KIT at 07:10

## 2024-10-21 RX ORDER — ALPRAZOLAM 0.25 MG/1
0.25 TABLET ORAL 3 TIMES DAILY
Qty: 30 TABLET | Refills: 0 | Status: SHIPPED | OUTPATIENT
Start: 2024-10-21

## 2024-10-24 RX ORDER — FUROSEMIDE 20 MG/1
20 TABLET ORAL
Qty: 30 TABLET | Refills: 0 | Status: SHIPPED | OUTPATIENT
Start: 2024-10-24

## 2024-11-06 ENCOUNTER — PATIENT OUTREACH (OUTPATIENT)
Dept: ADMINISTRATIVE | Facility: HOSPITAL | Age: 49
End: 2024-11-06
Payer: MEDICAID

## 2024-11-11 PROBLEM — I10 HYPERTENSION: Status: ACTIVE | Noted: 2024-11-11

## 2024-11-11 PROBLEM — A41.9 SEPSIS: Status: RESOLVED | Noted: 2024-09-17 | Resolved: 2024-11-11

## 2024-11-11 PROBLEM — E53.8 VITAMIN B12 DEFICIENCY (NON ANEMIC): Status: ACTIVE | Noted: 2024-11-11

## 2024-11-11 PROBLEM — E55.9 VITAMIN D DEFICIENCY: Status: ACTIVE | Noted: 2024-11-11

## 2024-11-11 PROBLEM — F41.9 ANXIETY: Status: ACTIVE | Noted: 2024-11-11

## 2024-11-11 PROBLEM — N12 PYELONEPHRITIS: Status: RESOLVED | Noted: 2024-09-17 | Resolved: 2024-11-11

## 2024-11-11 PROBLEM — I26.99 BILATERAL PULMONARY EMBOLISM: Status: ACTIVE | Noted: 2024-11-11

## 2024-11-11 PROBLEM — F17.200 TOBACCO DEPENDENCY: Status: RESOLVED | Noted: 2024-09-18 | Resolved: 2024-11-11

## 2024-11-11 PROBLEM — E87.20 LACTIC ACIDOSIS: Status: RESOLVED | Noted: 2024-09-17 | Resolved: 2024-11-11

## 2024-11-11 PROBLEM — H65.93 BILATERAL NON-SUPPURATIVE OTITIS MEDIA: Status: RESOLVED | Noted: 2024-03-05 | Resolved: 2024-11-11

## 2024-11-11 PROBLEM — M10.9 GOUT: Status: ACTIVE | Noted: 2024-11-11

## 2024-11-12 ENCOUNTER — LAB VISIT (OUTPATIENT)
Dept: LAB | Facility: HOSPITAL | Age: 49
End: 2024-11-12
Attending: INTERNAL MEDICINE
Payer: MEDICAID

## 2024-11-12 ENCOUNTER — OFFICE VISIT (OUTPATIENT)
Dept: INTERNAL MEDICINE | Facility: CLINIC | Age: 49
End: 2024-11-12
Payer: MEDICAID

## 2024-11-12 VITALS
WEIGHT: 160 LBS | DIASTOLIC BLOOD PRESSURE: 79 MMHG | TEMPERATURE: 98 F | OXYGEN SATURATION: 100 % | HEIGHT: 64 IN | BODY MASS INDEX: 27.31 KG/M2 | RESPIRATION RATE: 18 BRPM | HEART RATE: 88 BPM | SYSTOLIC BLOOD PRESSURE: 110 MMHG

## 2024-11-12 DIAGNOSIS — R10.9 ABDOMINAL PAIN, UNSPECIFIED ABDOMINAL LOCATION: ICD-10-CM

## 2024-11-12 DIAGNOSIS — D68.61 ANTIPHOSPHOLIPID ANTIBODY SYNDROME: ICD-10-CM

## 2024-11-12 DIAGNOSIS — R79.89 ELEVATED TESTOSTERONE LEVEL IN FEMALE: ICD-10-CM

## 2024-11-12 DIAGNOSIS — E87.20 LACTIC ACIDOSIS: ICD-10-CM

## 2024-11-12 DIAGNOSIS — Z23 NEED FOR INFLUENZA VACCINATION: Primary | ICD-10-CM

## 2024-11-12 DIAGNOSIS — E61.2 MAGNESIUM DEFICIENCY: Primary | ICD-10-CM

## 2024-11-12 DIAGNOSIS — T78.3XXS ANGIOEDEMA, SEQUELA: ICD-10-CM

## 2024-11-12 DIAGNOSIS — E53.8 FOLATE DEFICIENCY: ICD-10-CM

## 2024-11-12 DIAGNOSIS — R79.89 ELEVATED TESTOSTERONE LEVEL: ICD-10-CM

## 2024-11-12 DIAGNOSIS — E83.01 WILSON'S DISEASE: ICD-10-CM

## 2024-11-12 DIAGNOSIS — D69.6 THROMBOCYTOPENIA: ICD-10-CM

## 2024-11-12 DIAGNOSIS — R11.15 CYCLIC VOMITING SYNDROME: ICD-10-CM

## 2024-11-12 DIAGNOSIS — S22.000A COMPRESSION FRACTURE OF BODY OF THORACIC VERTEBRA: ICD-10-CM

## 2024-11-12 DIAGNOSIS — E87.21 ACUTE LACTIC ACIDOSIS: ICD-10-CM

## 2024-11-12 DIAGNOSIS — I10 HYPERTENSION, UNSPECIFIED TYPE: ICD-10-CM

## 2024-11-12 DIAGNOSIS — R80.9 PROTEINURIA, UNSPECIFIED TYPE: ICD-10-CM

## 2024-11-12 DIAGNOSIS — Z23 NEED FOR TDAP VACCINATION: ICD-10-CM

## 2024-11-12 DIAGNOSIS — R76.8 ANTINEUTROPHIL CYTOPLASMIC ANTIBODY (ANCA) POSITIVE: ICD-10-CM

## 2024-11-12 DIAGNOSIS — F17.200 NEEDS SMOKING CESSATION EDUCATION: ICD-10-CM

## 2024-11-12 LAB — PTH-INTACT SERPL-MCNC: 53.6 PG/ML (ref 8.7–77)

## 2024-11-12 PROCEDURE — 36415 COLL VENOUS BLD VENIPUNCTURE: CPT

## 2024-11-12 PROCEDURE — 84311 SPECTROPHOTOMETRY: CPT

## 2024-11-12 PROCEDURE — 82525 ASSAY OF COPPER: CPT

## 2024-11-12 PROCEDURE — 90656 IIV3 VACC NO PRSV 0.5 ML IM: CPT | Mod: PBBFAC

## 2024-11-12 PROCEDURE — 83970 ASSAY OF PARATHORMONE: CPT

## 2024-11-12 PROCEDURE — 84403 ASSAY OF TOTAL TESTOSTERONE: CPT

## 2024-11-12 PROCEDURE — 99215 OFFICE O/P EST HI 40 MIN: CPT | Mod: PBBFAC | Performed by: INTERNAL MEDICINE

## 2024-11-12 PROCEDURE — 82787 IGG 1 2 3 OR 4 EACH: CPT

## 2024-11-12 PROCEDURE — 90715 TDAP VACCINE 7 YRS/> IM: CPT | Mod: PBBFAC

## 2024-11-12 PROCEDURE — 82595 ASSAY OF CRYOGLOBULIN: CPT

## 2024-11-12 PROCEDURE — 82390 ASSAY OF CERULOPLASMIN: CPT

## 2024-11-12 PROCEDURE — 83883 ASSAY NEPHELOMETRY NOT SPEC: CPT

## 2024-11-12 PROCEDURE — 83520 IMMUNOASSAY QUANT NOS NONAB: CPT

## 2024-11-12 PROCEDURE — 90472 IMMUNIZATION ADMIN EACH ADD: CPT | Mod: PBBFAC

## 2024-11-12 PROCEDURE — 90471 IMMUNIZATION ADMIN: CPT | Mod: PBBFAC

## 2024-11-12 RX ORDER — METOPROLOL TARTRATE 50 MG/1
50 TABLET ORAL 2 TIMES DAILY
Qty: 60 TABLET | Refills: 3 | Status: SHIPPED | OUTPATIENT
Start: 2024-11-12

## 2024-11-12 RX ORDER — CEFUROXIME AXETIL 250 MG/1
6 TABLET ORAL
Qty: 6 ML | Refills: 2 | Status: SHIPPED | OUTPATIENT
Start: 2024-11-12 | End: 2024-12-12

## 2024-11-12 RX ORDER — ONDANSETRON 4 MG/1
4 TABLET, ORALLY DISINTEGRATING ORAL EVERY 8 HOURS PRN
Qty: 20 TABLET | Refills: 0 | Status: SHIPPED | OUTPATIENT
Start: 2024-11-12

## 2024-11-12 RX ORDER — AMITRIPTYLINE HYDROCHLORIDE 10 MG/1
12.5 TABLET, FILM COATED ORAL NIGHTLY
Qty: 30 TABLET | Refills: 11 | Status: SHIPPED | OUTPATIENT
Start: 2024-11-12 | End: 2025-11-12

## 2024-11-12 RX ORDER — MAGNESIUM 200 MG
400 TABLET ORAL DAILY
Qty: 60 EACH | Refills: 3 | Status: SHIPPED | OUTPATIENT
Start: 2024-11-12

## 2024-11-12 RX ORDER — POTASSIUM CHLORIDE 20 MEQ/15ML
20 SOLUTION ORAL DAILY
COMMUNITY

## 2024-11-12 RX ORDER — FOLIC ACID 1 MG/1
1 TABLET ORAL DAILY
COMMUNITY

## 2024-11-12 RX ADMIN — INFLUENZA VIRUS VACCINE 0.5 ML: 15; 15; 15 SUSPENSION INTRAMUSCULAR at 03:11

## 2024-11-12 RX ADMIN — TETANUS TOXOID, REDUCED DIPHTHERIA TOXOID AND ACELLULAR PERTUSSIS VACCINE, ADSORBED 0.5 ML: 5; 2.5; 8; 8; 2.5 SUSPENSION INTRAMUSCULAR at 03:11

## 2024-11-12 NOTE — PROGRESS NOTES
Subjective     Patient ID: Nahed Harvey is a 48 y.o. female.    Chief Complaint: Follow-up    Follow-up  Associated symptoms include chest pain, headaches, vomiting and weakness. Pertinent negatives include no arthralgias, joint swelling or neck pain.     This is a very strange case.  Patient was admitted with severe nausea vomiting hypotension lactic acidosis abdominal pain in a cause was never found.  She is very ill in the hospital about 2 months ago.  The cause of her profound lactic acidosis metabolic acidosis was never discovered.  She had thrombocytopenia folate deficiency borderline MPO ANCA abnormalities homocystine was elevated testosterone was elevated chronic fatigue so the problems she has had this nausea vomiting cyclic syndrome for years I am multiple tests, this visit 1 it cyclic vomiting syndrome for lack of a better term possibly it is some form of intestinal migraines with the reason going to try sumatriptan 6 mg subQ repeat in 1 hour 6 doses of a week in a more than 2 in 2 from.  She had a spell this last week it lasted 24 hours if it was going to last much longer she is going to come in to the emergency room characterized by nausea and vomiting lasting 24 hours until least every 20 minutes retching with an empty stomach she had a sharp stabbing pain in her right upper quadrant which would last about a minute was intermittent she has had a Pap HIDA scan in the past which was negative ultrasound of CT etc. she does have migraine headaches she has had for years in his she had a neurologist for this in the she received fluid tetanus shot today we will see her back in 2 months we are going to get labs she will come in the time this week to get she has left already by the time I am dictating this follow up on her folate deficiency thrombocytopenia low C3 abnormalities in the chart she had a borderline positive pink time for this visit 1 hour  Review of Systems   Constitutional:  Positive for  activity change. Negative for unexpected weight change.   HENT:  Negative for hearing loss, rhinorrhea and trouble swallowing.    Eyes:  Negative for discharge and visual disturbance.   Respiratory:  Negative for chest tightness and wheezing.    Cardiovascular:  Positive for chest pain. Negative for palpitations.   Gastrointestinal:  Positive for diarrhea and vomiting. Negative for blood in stool and constipation.   Endocrine: Negative for polydipsia and polyuria.   Genitourinary:  Negative for difficulty urinating, dysuria, hematuria and menstrual problem.   Musculoskeletal:  Negative for arthralgias, joint swelling and neck pain.   Neurological:  Positive for weakness and headaches.   Psychiatric/Behavioral:  Positive for dysphoric mood. Negative for confusion.    All other systems reviewed and are negative.         Objective     Physical Exam  Vitals and nursing note reviewed.   Constitutional:       Appearance: Normal appearance.   HENT:      Head: Normocephalic and atraumatic.      Right Ear: Tympanic membrane, ear canal and external ear normal.      Left Ear: Tympanic membrane, ear canal and external ear normal.      Nose: Nose normal.      Mouth/Throat:      Mouth: Mucous membranes are dry.      Pharynx: Oropharynx is clear.   Eyes:      Extraocular Movements: Extraocular movements intact.      Conjunctiva/sclera: Conjunctivae normal.      Pupils: Pupils are equal, round, and reactive to light.   Cardiovascular:      Rate and Rhythm: Normal rate and regular rhythm.      Pulses: Normal pulses.      Heart sounds: Normal heart sounds.   Pulmonary:      Effort: Pulmonary effort is normal.      Breath sounds: Normal breath sounds.   Abdominal:      General: Bowel sounds are normal.      Palpations: Abdomen is soft.   Musculoskeletal:      Cervical back: Normal range of motion and neck supple.   Skin:     General: Skin is warm and dry.   Neurological:      General: No focal deficit present.      Mental Status: She  is alert and oriented to person, place, and time. Mental status is at baseline.   Psychiatric:         Mood and Affect: Mood normal.         Behavior: Behavior normal.         Thought Content: Thought content normal.         Judgment: Judgment normal.            Assessment and Plan     1. Need for influenza vaccination  -     influenza (Flulaval, Fluzone, Fluarix) 45 mcg/0.5 mL IM vaccine (> or = 6 mo) 0.5 mL    2. Need for Tdap vaccination  -     Tdap (BOOSTRIX) vaccine injection 0.5 mL    3. Cyclic vomiting syndrome  -     sumatriptan (IMITREX STATDOSE) 6 mg/0.5 mL kit; Inject 0.5 mLs (6 mg total) into the skin every 2 (two) hours as needed for Migraine (may repeat once in one hour. no more than 2 doses on 24 hours, 6 doses in one week.).  Dispense: 6 mL; Refill: 2  -     amitriptyline (ELAVIL) 10 MG tablet; Take 1 tablet (10 mg total) by mouth every evening.  Dispense: 30 tablet; Refill: 11    4. Acute lactic acidosis  -     CBC Auto Differential; Future; Expected date: 11/12/2024  -     Comprehensive Metabolic Panel; Future; Expected date: 11/12/2024  -     MPO/PR3 (ANCA) ANTIBODIES; Future; Expected date: 11/12/2024  -     C3 Complement; Future; Expected date: 11/12/2024  -     C4 Complement; Future; Expected date: 11/12/2024  -     Complement, Total; Future; Expected date: 11/12/2024  -     Microalbumin/Creatinine Ratio, Urine; Future; Expected date: 11/12/2024  -     CARDIOLIPIN ANTIBODIES IGG, IGM QUANT; Future; Expected date: 11/13/2024  -     Lupus Anticoagulant; Future; Expected date: 11/13/2024    5. Thrombocytopenia  -     CBC Auto Differential; Future; Expected date: 11/12/2024  -     Comprehensive Metabolic Panel; Future; Expected date: 11/12/2024  -     Folate; Future; Expected date: 11/12/2024    6. Folate deficiency  -     Homocysteine, Serum; Future; Expected date: 11/12/2024    7. Elevated testosterone level  -     Testosterone; Future; Expected date: 11/12/2024    8. Proteinuria, unspecified  type  -     Urinalysis    9. Antineutrophil cytoplasmic antibody (ANCA) positive        See above follow up in 2 months sooner if any problems check labs this week         Follow up in about 2 months (around 1/12/2025).

## 2024-11-14 ENCOUNTER — DOCUMENTATION ONLY (OUTPATIENT)
Dept: ADMINISTRATIVE | Facility: HOSPITAL | Age: 49
End: 2024-11-14
Payer: MEDICAID

## 2024-11-14 LAB
C1INH SERPL-MCNC: 37 MG/DL (ref 19–37)
IGG4 SER-MCNC: 65.2 MG/DL (ref 2.4–121)

## 2024-11-15 ENCOUNTER — LAB VISIT (OUTPATIENT)
Dept: LAB | Facility: HOSPITAL | Age: 49
End: 2024-11-15
Attending: INTERNAL MEDICINE
Payer: MEDICAID

## 2024-11-15 DIAGNOSIS — Z00.00 ROUTINE GENERAL MEDICAL EXAMINATION AT HEALTH CARE FACILITY: ICD-10-CM

## 2024-11-15 DIAGNOSIS — E53.8 FOLATE DEFICIENCY: Primary | ICD-10-CM

## 2024-11-15 DIAGNOSIS — E87.21 ACUTE LACTIC ACIDOSIS: ICD-10-CM

## 2024-11-15 DIAGNOSIS — E53.8 FOLATE DEFICIENCY: ICD-10-CM

## 2024-11-15 DIAGNOSIS — D69.6 THROMBOCYTOPENIA: ICD-10-CM

## 2024-11-15 DIAGNOSIS — R79.89 ELEVATED TESTOSTERONE LEVEL IN FEMALE: ICD-10-CM

## 2024-11-15 DIAGNOSIS — R79.89 ELEVATED TESTOSTERONE LEVEL: ICD-10-CM

## 2024-11-15 LAB
(HCYS)2 SERPL-MCNC: 38.2 UMOL/L (ref 5.1–15.4)
ALBUMIN SERPL-MCNC: 3.9 G/DL (ref 3.5–5)
ALBUMIN/GLOB SERPL: 1 RATIO (ref 1.1–2)
ALP SERPL-CCNC: 100 UNIT/L (ref 40–150)
ALT SERPL-CCNC: 70 UNIT/L (ref 0–55)
ANION GAP SERPL CALC-SCNC: 12 MEQ/L
AST SERPL-CCNC: 45 UNIT/L (ref 5–34)
BACTERIA #/AREA URNS AUTO: ABNORMAL /HPF
BASOPHILS # BLD AUTO: 0.03 X10(3)/MCL
BASOPHILS NFR BLD AUTO: 0.7 %
BILIRUB SERPL-MCNC: 0.3 MG/DL
BILIRUB UR QL STRIP.AUTO: NEGATIVE
BUN SERPL-MCNC: 16 MG/DL (ref 9.8–20.1)
C3 SERPL-MCNC: 122 MG/DL (ref 80–173)
C4 SERPL-MCNC: 27 MG/DL (ref 13–46)
CALCIUM SERPL-MCNC: 9.9 MG/DL (ref 8.4–10.2)
CERULOPLASMIN SERPL-MCNC: 25.6 MG/DL (ref 20–51)
CHLORIDE SERPL-SCNC: 98 MMOL/L (ref 98–107)
CLARITY UR: CLEAR
CLINICAL BIOCHEMIST REVIEW: NORMAL
CO2 SERPL-SCNC: 28 MMOL/L (ref 22–29)
COLOR UR AUTO: COLORLESS
CREAT SERPL-MCNC: 1.11 MG/DL (ref 0.55–1.02)
CREAT UR-MCNC: 41.3 MG/DL (ref 45–106)
CREAT/UREA NIT SERPL: 14
EOSINOPHIL # BLD AUTO: 0.05 X10(3)/MCL (ref 0–0.9)
EOSINOPHIL NFR BLD AUTO: 1.2 %
ERYTHROCYTE [DISTWIDTH] IN BLOOD BY AUTOMATED COUNT: 12.4 % (ref 11.5–17)
FOLATE SERPL-MCNC: 4.1 NG/ML (ref 7–31.4)
GFR SERPLBLD CREATININE-BSD FMLA CKD-EPI: >60 ML/MIN/1.73/M2
GLOBULIN SER-MCNC: 3.8 GM/DL (ref 2.4–3.5)
GLUCOSE SERPL-MCNC: 100 MG/DL (ref 74–100)
GLUCOSE UR QL STRIP: NORMAL
HCT VFR BLD AUTO: 41.5 % (ref 37–47)
HGB BLD-MCNC: 14.1 G/DL (ref 12–16)
HGB UR QL STRIP: NEGATIVE
HYALINE CASTS #/AREA URNS LPF: ABNORMAL /LPF
IMM GRANULOCYTES # BLD AUTO: 0.01 X10(3)/MCL (ref 0–0.04)
IMM GRANULOCYTES NFR BLD AUTO: 0.2 %
KETONES UR QL STRIP: NEGATIVE
LEUKOCYTE ESTERASE UR QL STRIP: NEGATIVE
LYMPHOCYTES # BLD AUTO: 1.85 X10(3)/MCL (ref 0.6–4.6)
LYMPHOCYTES NFR BLD AUTO: 42.9 %
MCH RBC QN AUTO: 35.5 PG (ref 27–31)
MCHC RBC AUTO-ENTMCNC: 34 G/DL (ref 33–36)
MCV RBC AUTO: 104.5 FL (ref 80–94)
MICROALBUMIN UR-MCNC: <5 UG/ML
MICROALBUMIN/CREAT RATIO PNL UR: ABNORMAL
MONOCYTES # BLD AUTO: 0.6 X10(3)/MCL (ref 0.1–1.3)
MONOCYTES NFR BLD AUTO: 13.9 %
MUCOUS THREADS URNS QL MICRO: ABNORMAL /LPF
NEUTROPHILS # BLD AUTO: 1.77 X10(3)/MCL (ref 2.1–9.2)
NEUTROPHILS NFR BLD AUTO: 41.1 %
NITRITE UR QL STRIP: NEGATIVE
NRBC BLD AUTO-RTO: 0 %
PH UR STRIP: 6.5 [PH]
PLATELET # BLD AUTO: 224 X10(3)/MCL (ref 130–400)
PMV BLD AUTO: 10.2 FL (ref 7.4–10.4)
PORPHYRINS SERPL-MCNC: <1 MCG/DL
POTASSIUM SERPL-SCNC: 3.6 MMOL/L (ref 3.5–5.1)
PROT SERPL-MCNC: 7.7 GM/DL (ref 6.4–8.3)
PROT UR QL STRIP: NEGATIVE
PROVIDER SIGNING NAME: NORMAL
RBC # BLD AUTO: 3.97 X10(6)/MCL (ref 4.2–5.4)
RBC #/AREA URNS AUTO: ABNORMAL /HPF
SODIUM SERPL-SCNC: 138 MMOL/L (ref 136–145)
SP GR UR STRIP.AUTO: 1.01 (ref 1–1.03)
SQUAMOUS #/AREA URNS LPF: ABNORMAL /HPF
TESTOST SERPL-MCNC: 85.29 NG/DL (ref 12.4–35.75)
TT IMM BOVINE THROMBIN PPP: 16 SECONDS (ref 0–22)
UROBILINOGEN UR STRIP-ACNC: NORMAL
WBC # BLD AUTO: 4.31 X10(3)/MCL (ref 4.5–11.5)
WBC #/AREA URNS AUTO: ABNORMAL /HPF

## 2024-11-15 PROCEDURE — 82746 ASSAY OF FOLIC ACID SERUM: CPT

## 2024-11-15 PROCEDURE — 81003 URINALYSIS AUTO W/O SCOPE: CPT

## 2024-11-15 PROCEDURE — 86036 ANCA SCREEN EACH ANTIBODY: CPT

## 2024-11-15 PROCEDURE — 80053 COMPREHEN METABOLIC PANEL: CPT

## 2024-11-15 PROCEDURE — 85670 THROMBIN TIME PLASMA: CPT

## 2024-11-15 PROCEDURE — 36415 COLL VENOUS BLD VENIPUNCTURE: CPT

## 2024-11-15 PROCEDURE — 85025 COMPLETE CBC W/AUTO DIFF WBC: CPT

## 2024-11-15 PROCEDURE — 84403 ASSAY OF TOTAL TESTOSTERONE: CPT

## 2024-11-15 PROCEDURE — 86162 COMPLEMENT TOTAL (CH50): CPT

## 2024-11-15 PROCEDURE — 86147 CARDIOLIPIN ANTIBODY EA IG: CPT

## 2024-11-15 PROCEDURE — 82043 UR ALBUMIN QUANTITATIVE: CPT

## 2024-11-15 PROCEDURE — 86160 COMPLEMENT ANTIGEN: CPT

## 2024-11-15 PROCEDURE — 83090 ASSAY OF HOMOCYSTEINE: CPT

## 2024-11-15 PROCEDURE — 85613 RUSSELL VIPER VENOM DILUTED: CPT

## 2024-11-15 RX ORDER — FOLIC ACID 1 MG/1
1 TABLET ORAL DAILY
Qty: 360 TABLET | Refills: 0 | Status: SHIPPED | OUTPATIENT
Start: 2024-11-15 | End: 2025-11-15

## 2024-11-15 RX ORDER — FUROSEMIDE 20 MG/1
20 TABLET ORAL DAILY
Qty: 30 TABLET | Refills: 0 | Status: SHIPPED | OUTPATIENT
Start: 2024-11-15

## 2024-11-17 LAB
TESTOST FREE SERPL-MCNC: 0.77 NG/DL
TESTOST SERPL-MCNC: 76 NG/DL (ref 8–60)

## 2024-11-18 LAB
BEAKER SEE SCANNED REPORT: NORMAL
C1INH ACT/NOR SERPL: >90 %
CH50 SERPL-ACNC: 64 U/ML (ref 30–75)
CRYOGLOB SER QL 1D COLD INC: NORMAL %PPT

## 2024-11-19 LAB
DRVV CONF RATIO (OHS): 1.64
DRVV NORM RATIO (OHS): 1.88 (ref 0–1.19)
DRVV SCR RATIO (OHS): 3.09
L.A. PATH INTERP (OHS): ABNORMAL
LA ST CALC (OHS): 3.8 SECONDS (ref 0–7.9)

## 2024-11-20 LAB
CARDIOLIPIN IGG QUANT (OHS): 1.3 GPL U/ML
CARDIOLIPIN IGM QUANT (OHS): 12 MPL U/ML
GBM QUANT (OHS): <1.5 U/ML
MPO QUANT (OLG): 12 U/ML
PR3 QUANT (OHS): <0.6 U/ML

## 2024-11-21 ENCOUNTER — OFFICE VISIT (OUTPATIENT)
Dept: BEHAVIORAL HEALTH | Facility: CLINIC | Age: 49
End: 2024-11-21
Payer: MEDICAID

## 2024-11-21 VITALS
BODY MASS INDEX: 28.08 KG/M2 | SYSTOLIC BLOOD PRESSURE: 132 MMHG | HEART RATE: 91 BPM | WEIGHT: 163.63 LBS | TEMPERATURE: 98 F | OXYGEN SATURATION: 100 % | DIASTOLIC BLOOD PRESSURE: 89 MMHG

## 2024-11-21 DIAGNOSIS — F41.1 GAD (GENERALIZED ANXIETY DISORDER): ICD-10-CM

## 2024-11-21 DIAGNOSIS — R76.8 ABNORMAL ANCA TEST: Primary | ICD-10-CM

## 2024-11-21 DIAGNOSIS — F32.A DEPRESSION, UNSPECIFIED DEPRESSION TYPE: Primary | ICD-10-CM

## 2024-11-21 PROCEDURE — 99215 OFFICE O/P EST HI 40 MIN: CPT | Mod: PBBFAC,PN | Performed by: STUDENT IN AN ORGANIZED HEALTH CARE EDUCATION/TRAINING PROGRAM

## 2024-11-21 PROCEDURE — 3066F NEPHROPATHY DOC TX: CPT | Mod: CPTII,,, | Performed by: STUDENT IN AN ORGANIZED HEALTH CARE EDUCATION/TRAINING PROGRAM

## 2024-11-21 PROCEDURE — 3008F BODY MASS INDEX DOCD: CPT | Mod: CPTII,,, | Performed by: STUDENT IN AN ORGANIZED HEALTH CARE EDUCATION/TRAINING PROGRAM

## 2024-11-21 PROCEDURE — 3061F NEG MICROALBUMINURIA REV: CPT | Mod: CPTII,,, | Performed by: STUDENT IN AN ORGANIZED HEALTH CARE EDUCATION/TRAINING PROGRAM

## 2024-11-21 PROCEDURE — 3079F DIAST BP 80-89 MM HG: CPT | Mod: CPTII,,, | Performed by: STUDENT IN AN ORGANIZED HEALTH CARE EDUCATION/TRAINING PROGRAM

## 2024-11-21 PROCEDURE — 99205 OFFICE O/P NEW HI 60 MIN: CPT | Mod: S$PBB,,, | Performed by: STUDENT IN AN ORGANIZED HEALTH CARE EDUCATION/TRAINING PROGRAM

## 2024-11-21 PROCEDURE — 3075F SYST BP GE 130 - 139MM HG: CPT | Mod: CPTII,,, | Performed by: STUDENT IN AN ORGANIZED HEALTH CARE EDUCATION/TRAINING PROGRAM

## 2024-11-21 PROCEDURE — 1160F RVW MEDS BY RX/DR IN RCRD: CPT | Mod: CPTII,,, | Performed by: STUDENT IN AN ORGANIZED HEALTH CARE EDUCATION/TRAINING PROGRAM

## 2024-11-21 PROCEDURE — 1159F MED LIST DOCD IN RCRD: CPT | Mod: CPTII,,, | Performed by: STUDENT IN AN ORGANIZED HEALTH CARE EDUCATION/TRAINING PROGRAM

## 2024-11-21 PROCEDURE — 3044F HG A1C LEVEL LT 7.0%: CPT | Mod: CPTII,,, | Performed by: STUDENT IN AN ORGANIZED HEALTH CARE EDUCATION/TRAINING PROGRAM

## 2024-11-21 RX ORDER — DULOXETIN HYDROCHLORIDE 30 MG/1
30 CAPSULE, DELAYED RELEASE ORAL DAILY
Qty: 30 CAPSULE | Refills: 5 | Status: SHIPPED | OUTPATIENT
Start: 2024-11-21 | End: 2025-11-21

## 2024-11-21 NOTE — PROGRESS NOTES
"Outpatient Psychiatry Initial Visit    11/21/2024    Nahed Harvey, a 48 y.o. female, presenting for initial evaluation visit. Met with patient.    Reason for Encounter:   Referred from: Brandon Harrison MD  Reason for referral: "KIERRA (generalized anxiety disorder)"  Chief complaint: anxixety x years    History of Present Illness:   Pt is a 49yo F w/ PPHx of anxiety  who presents to psychiatry clinic for evaluation.      Pt presents for evaluation and management of anxiety symptoms.  Notes that symptoms started years ago (around the time she was going through menopause).  Notes that her anxiety increased significantly due to recent health issues (2 yrs ago had recurrent blood clots).  Says that undergoing imaging studies makes her claustrophobic and driving in cars causes intense fear.  Having occasional panic attacks, last about 1 month ago after discharge from the hospital.  Was given prescription for xanax with mild benefit, notes "it makes me more tired," says "I don't like the way it makes me feel."  Notes she tries to engage in deep breathing exercises to manage.  Notes she was given prescription for elavil for her sleep, notes benefit.  Also has been prescribed wellbutrin, notes SE dry mouth, helped pt to quit smoking but no other benefits.      Regarding depression, pt endorses history of depressive episodes.  Endorses currently feeling depressed.  Regarding historical depressive episodes, episodes usually last weeks to months in duration.  Episodes are usually associated with identifiable triggers.  Depressive mood associated with decreased appetite, decreased sleep, poor concentration, poor energy, + anhedonia, poor motivation, +irritability, + hopelessness.  Denies history of suicidal thoughts, denies history of suicide attempts.      Denies history of episodes concerning for susi/hypomania.      Endorses history of hallucinations or other altered perceptions, denies paranoid ideation.      Endorses " "excess worry/anxiety.  Denies growing up with excessive anxiety.  Worries are mostly about ongoing life stressors (particularly health and family stressors).  Notes associated symptoms: endorses rumination, + sleep difficulty, + concentration, + irritability, + tension or feeling "on edge," denies muscle tension, + HA, denies GI upset.  Reports occasional panic attacks.  Notes avoidance of suspected triggers for panic attacks.  Notes some social anxiety, fears being the center of attention and being judged.      Denies history of significant traumatic events.      Regarding sleep, notes sleeping 6-7 hours in average night.  Sleep latency hours.  Reports 2 nighttime awakenings, 2x nocturia.  + snoring, + witnessed apnea.  Notes feeling tired on awakening.  Endorses excessive daytime sleepiness.  Denies  history of sleep paralysis, denies sleep associated hallucinations.  Endorses history of sleep study (last 06/2024), endorses history of sleep disorder diagnosis (JAYDA).  Endorses family history of sleep disorder (mother with JAYDA, father with JAYDA).  Has a cpap, wears most nights and for most of the night.  Notes some benefit from cpap treatment.       Meds Hx (has pt taken the following):   SSRIs: denies  SNRIs: denies  TCAs: elavil (helpful, no SE)  Atypical ADs: wellbutrin (not helpful, SE dry mouth)  Anxiolytics: xanax (some benefit, SE grogginess), valium (SE grogginess)  Neuroleptics: denies  Mood stabilizers: denies  Stimulants: tried a stimulant in high school (Ritalin vs Adderall)  Other: denies    History:     Allergies:  Patient has no known allergies.    Past Medical/Surgical History:  Past Medical History:   Diagnosis Date    Bilateral non-suppurative otitis media 03/05/2024    Hypertension     Pyelonephritis 09/17/2024    Sepsis 09/17/2024    Tobacco dependency 09/18/2024     Past Surgical History:   Procedure Laterality Date    INSERTION OF INFERIOR VENA CAVAL FILTER N/A 9/16/2022    Procedure: Insertion, " Filter, Inferior Vena Cava;  Surgeon: Juancarlos Victoria MD;  Location: [facility] CATH LAB;  Service: Cardiology;  Laterality: N/A;    IVC FILTER RETRIEVAL N/A 2/7/2024    Procedure: REMOVAL, FILTER, INFERIOR VENA CAVA;  Surgeon: Juancarlos Victoria MD;  Location: [facility] CATH LAB;  Service: Cardiology;  Laterality: N/A;  IVC FILTER REMOVAL    NEPHRECTOMY      PERCUTANEOUS MECHANICAL THROMBECTOMY OF VASCULAR GRAFT OF UPPER EXTREMITY  9/14/2022    Procedure: THROMBECTOMY, MECHANICAL, VASCULAR GRAFT, UPPER EXTREMITY, PERCUTANEOUS;  Surgeon: Juancarlos Victoria MD;  Location: [facility] CATH LAB;  Service: Cardiology;;    PHLEBOGRAPHY  9/16/2022    Procedure: VENOGRAM;  Surgeon: Juancarlos Victoria MD;  Location: [facility] CATH LAB;  Service: Cardiology;;    THROMBECTOMY  9/14/2022    Procedure: THROMBECTOMY;  Surgeon: Juancarlos Victoria MD;  Location: [facility] CATH LAB;  Service: Cardiology;;       Medications  Outpatient Encounter Medications as of 11/21/2024   Medication Sig Dispense Refill    allopurinoL (ZYLOPRIM) 100 MG tablet Take 100 mg by mouth once daily.      ALPRAZolam (XANAX) 0.25 MG tablet TAKE 1 TABLET BY MOUTH 3 TIMES DAILY AS NEEDED FOR ANXIETY. 30 tablet 0    amitriptyline (ELAVIL) 10 MG tablet Take 1 tablet (10 mg total) by mouth every evening. 30 tablet 11    cetirizine (ZYRTEC) 5 MG tablet Take 5 mg by mouth once daily.      folic acid (FOLVITE) 1 MG tablet Take 1 tablet (1 mg total) by mouth once daily. 360 tablet 0    furosemide (LASIX) 20 MG tablet Take 1 tablet (20 mg total) by mouth once daily. 30 tablet 0    magnesium 200 mg Tab Take 400 mg by mouth Daily. 60 each 3    metoprolol tartrate (LOPRESSOR) 50 MG tablet Take 1 tablet (50 mg total) by mouth 2 (two) times daily. 60 tablet 3    pantoprazole (PROTONIX) 40 MG tablet Take 1 tablet (40 mg total) by mouth once daily. 90 tablet 3    potassium chloride 10% (KAYCIEL) 20 mEq/15 mL oral solution Take 20 mEq by mouth once daily.      XARELTO 20 mg Tab Take 20 mg by mouth Daily.      cyanocobalamin  "(VITAMIN B-12) 1000 MCG tablet Take 100 mcg by mouth once daily.      diphenhydrAMINE (BENADRYL) 50 MG capsule Take 50 mg by mouth nightly as needed for Insomnia.      DULoxetine (CYMBALTA) 30 MG capsule Take 1 capsule (30 mg total) by mouth once daily. 30 capsule 5    fluticasone propionate (FLONASE) 50 mcg/actuation nasal spray 1 spray by Each Nostril route Daily.      folic acid (FOLVITE) 1 MG tablet Take 1 mg by mouth once daily.      hydrALAZINE (APRESOLINE) 25 MG tablet Take 1 tablet (25 mg total) by mouth every 8 (eight) hours. (Patient taking differently: Take 50 mg by mouth every 8 (eight) hours.) 90 tablet 2    multivitamin Tab Take 1 tablet by mouth once daily. (Patient not taking: Reported on 11/12/2024) 90 tablet 3    omeprazole (PRILOSEC) 20 MG capsule Take 20 mg by mouth once daily.      ondansetron (ZOFRAN-ODT) 4 MG TbDL Take 1 tablet (4 mg total) by mouth every 8 (eight) hours as needed (nausea/vomiting). 20 tablet 0    progesterone (PROMETRIUM) 200 MG capsule Take 200 mg by mouth every evening.      promethazine (PHENERGAN) 12.5 MG Tab Take 12.5 mg by mouth every 6 (six) hours as needed.      sucralfate (CARAFATE) 100 mg/mL suspension Take 10 mLs (1 g total) by mouth every 6 (six) hours. 473 mL 11    sumatriptan (IMITREX STATDOSE) 6 mg/0.5 mL kit Inject 0.5 mLs (6 mg total) into the skin every 2 (two) hours as needed for Migraine (may repeat once in one hour. no more than 2 doses on 24 hours, 6 doses in one week.). 6 mL 2    valACYclovir (VALTREX) 1000 MG tablet Take 1,000 mg by mouth daily as needed ("FEVER BLISTER").      [DISCONTINUED] buPROPion (WELLBUTRIN XL) 300 MG 24 hr tablet Take 150 mg by mouth once daily. (Patient not taking: Reported on 11/21/2024)      [DISCONTINUED] furosemide (LASIX) 20 MG tablet TAKE 1 TABLET BY MOUTH EVERY DAY 30 tablet 0    [DISCONTINUED] furosemide (LASIX) 20 MG tablet TAKE 1 TABLET BY MOUTH EVERY DAY 30 tablet 0    [DISCONTINUED] MAGNESIUM ORAL Take 400 mg by " mouth Daily.      [DISCONTINUED] metoprolol tartrate (LOPRESSOR) 50 MG tablet Take 50 mg by mouth 2 (two) times daily.      [DISCONTINUED] ondansetron (ZOFRAN-ODT) 4 MG TbDL Take 1 tablet (4 mg total) by mouth every 8 (eight) hours as needed (nausea/vomiting). 20 tablet 0     Facility-Administered Encounter Medications as of 11/21/2024   Medication Dose Route Frequency Provider Last Rate Last Admin    diphenhydrAMINE capsule 50 mg  50 mg Oral On Call Procedure Juancarlos Victoria MD   50 mg at 02/07/24 1129    sodium chloride 0.9% flush 10 mL  10 mL Intravenous PRN Juancarlos Victoria MD         Past Psychiatric History:  Previous Medication Trials: See above   Previous Psychiatric Hospitalizations: denies   Previous Suicide Attempts: denies   History of Violence: None in past 6 months  Outpatient mental health: denies  Family History: mother with depression     Social History:  Marital Status:   Children: 0   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  Education: HS grad, some college  Housing Status: lives in house with   History of phys/sexual abuse: denies  Access to gun: yes, stored in gun safe, one loaded, ammunition kept with firearm    Substance Abuse History:  Tobacco Use: trying to quit, 0.25ppd, started 13 yrs old, has quit for 10 yrs in the past  Use of Alcohol:  3x per week, (usual) 2 vodka drinks per sitting, at most drinks 4-5 in one sitting  Recreational Drugs: cannabis gummies  Inhalents: denies  Caffeine: consumes 1 caffeinated beverages on a daily baisis (including soft drinks, coffee, tea, etc)  OTC products: denies   Non-prescribed use of prescription medications: denies  Rehab/detox: denies    Legal History:  Past Charges/Incarcerations: denies   Pending charges: denies     Psychosocial Stressors: family, health, and occupational    Review Of Systems:     Constitutional: denies fevers, denies chills, denies recent weight change  Eyes: denies pain in eyes or loss of  "vision  Ears: denies tinnitis, denies loss of hearing  Mouth/throat: denies difficulty with speaking, denies difficulty with swallowing  Cardiac: denies CP, + intermittent palpitations  Respiratory: + SOB, denies cough  Gastrointestinal: + abdominal pain, + nausea/vomiting, denies constipation/diarrhea  Genitourinary: denies urinary frequency, denies burning on urination  Dermatologic: denies rash, denies erythema  Musculoskeletal: denies myalgias, denies arthralgias  Hematologic: denies easy bleeding/bruising, denies enlarged lymph nodes  Neurologic: denies seizures, + headaches, + loss of sensation (BL LE), denies weakness  Psychiatric: see HPI    Current Evaluation:     Nutritional Screening: Considering the patient's height and weight, medications, medical history and preferences, should a referral be made to the dietitian? no    Constitutional  Vitals:  Most recent vital signs, dated less than 90 days prior to this appointment, were reviewed.      Vitals:    11/21/24 0855   BP: 132/89   Pulse: 91   Temp: 97.9 °F (36.6 °C)   SpO2: 100%   Weight: 74.2 kg (163 lb 9.6 oz)        General:    No acute distress     Neurologic:   Motor: moves all extremities spontaneously and without difficulty  Gait: normal gait and station    Mental status examination:  Appearance: unremarkable, age appropriate  Level of Consciousness: awake and alert  Behavior/Cooperation: calm and cooperative  Psychomotor: unremarkable  Speech: normal tone, normal rate, normal pitch, normal volume  Language: english, fluid  Memory: Registers 3/3 objects, recalls 3/3 objects at 5 minutes without cuing  Orientation: grossly intact, person, place, situation, day of week, month of year, year  Mood: "anxious"  Affect: mood congruent, constricted, and anxious-appearing  Attention Span/Concentration: intact to interview and spells "WORLD" forwards and backwards without error  Thought Process: linear, goal-directed  Thought Content: denies SI/HI/paranoia, " no delusional ideation volunteered, denies plan or desire for self harm or harm to others  Perceptions: denies hallucinations or other altered perceptions  Associations: Logical and appropriate  Fund of Knowledge: appropriate for education  Abstraction: similarities were abstract  Insight: good  Judgment: good    Relevant Elements of Neurological Exam: no abnormal involuntary movements observed    Functioning in Relationships:  Spouse/partner: good  Peers: good  Employers: good    Assessments:   PHQ9:       11/21/2024   PHQ-9 Depression Patient Health Questionnaire   Over the last two weeks how often have you been bothered by little interest or pleasure in doing things 3   Over the last two weeks how often have you been bothered by feeling down, depressed or hopeless 3   Over the last two weeks how often have you been bothered by trouble falling or staying asleep, or sleeping too much 2   Over the last two weeks how often have you been bothered by feeling tired or having little energy 3   Over the last two weeks how often have you been bothered by a poor appetite or overeating 0   Over the last two weeks how often have you been bothered by feeling bad about yourself - or that you are a failure or have let yourself or your family down 3   Over the last two weeks how often have you been bothered by trouble concentrating on things, such as reading the newspaper or watching television 3   Over the last two weeks how often have you been bothered by moving or speaking so slowly that other people could have noticed. 0   Over the last two weeks how often have you been bothered by thoughts that you would be better off dead, or of hurting yourself 0   If you checked off any problems, how difficult have these problems made it for you to do your work, take care of things at home or get along with other people? Very difficult   PHQ-9 Score 17      GAD7:       11/21/2024     8:54 AM   KIERRA-7   Was test performed? Yes   1. Feeling  nervous, anxious, or on edge? Nearly everyday   2. Not being able to stop or control worrying? Nearly everyday   3. Worrying too much about different things? Nearly everyday   4. Trouble relaxing? Nearly everyday   5. Being so restless that it is hard to sit still? Not at all   6. Becoming easily annoyed or irritable? More than half the days   7. Feeling afraid as if something awful might happen? Nearly everyday   8. If you checked off any problems, how difficult have these problems made it for you to do your work, take care of things at home, or get along with other people? Extremely difficult   KIERRA-7 Score 17   Number answered (out of first 7) 7   Interpretation Severe Anxiety     Laboratory Data  Lab Visit on 11/15/2024   Component Date Value Ref Range Status    Sodium 11/15/2024 138  136 - 145 mmol/L Final    Potassium 11/15/2024 3.6  3.5 - 5.1 mmol/L Final    Chloride 11/15/2024 98  98 - 107 mmol/L Final    CO2 11/15/2024 28  22 - 29 mmol/L Final    Glucose 11/15/2024 100  74 - 100 mg/dL Final    Blood Urea Nitrogen 11/15/2024 16.0  9.8 - 20.1 mg/dL Final    Creatinine 11/15/2024 1.11 (H)  0.55 - 1.02 mg/dL Final    Calcium 11/15/2024 9.9  8.4 - 10.2 mg/dL Final    Protein Total 11/15/2024 7.7  6.4 - 8.3 gm/dL Final    Albumin 11/15/2024 3.9  3.5 - 5.0 g/dL Final    Globulin 11/15/2024 3.8 (H)  2.4 - 3.5 gm/dL Final    Albumin/Globulin Ratio 11/15/2024 1.0 (L)  1.1 - 2.0 ratio Final    Bilirubin Total 11/15/2024 0.3  <=1.5 mg/dL Final    ALP 11/15/2024 100  40 - 150 unit/L Final    ALT 11/15/2024 70 (H)  0 - 55 unit/L Final    AST 11/15/2024 45 (H)  5 - 34 unit/L Final    eGFR 11/15/2024 >60  mL/min/1.73/m2 Final    Anion Gap 11/15/2024 12.0  mEq/L Final    BUN/Creatinine Ratio 11/15/2024 14   Final    Folate Level 11/15/2024 4.1 (L)  7.0 - 31.4 ng/mL Final    Homocysteine 11/15/2024 38.2 (H)  5.1 - 15.4 umol/L Final    Testosterone Total 11/15/2024 85.29 (H)  12.40 - 35.75 ng/dL Final    GBM Ab Quant  11/15/2024 <1.5  <7.0 U/mL Final    PR3 IgG Ab Quant 11/15/2024 <0.6  <2 U/mL Final    MPO IgG Ab Quant 11/15/2024 12 (H)  <3.5 U/mL Final    C3 Complement 11/15/2024 122  80 - 173 mg/dL Final    C4 Complement 11/15/2024 27.0  13.0 - 46.0 mg/dL Final    Complement, Total 11/15/2024 64  30 - 75 U/mL Final    Cardiolipin IgG Quant 11/15/2024 1.3  <10 GPL U/mL Final    Cardiolipin IgM Quant 11/15/2024 12 (H)  <10 MPL U/mL Final    WBC 11/15/2024 4.31 (L)  4.50 - 11.50 x10(3)/mcL Final    RBC 11/15/2024 3.97 (L)  4.20 - 5.40 x10(6)/mcL Final    Hgb 11/15/2024 14.1  12.0 - 16.0 g/dL Final    Hct 11/15/2024 41.5  37.0 - 47.0 % Final    MCV 11/15/2024 104.5 (H)  80.0 - 94.0 fL Final    MCH 11/15/2024 35.5 (H)  27.0 - 31.0 pg Final    MCHC 11/15/2024 34.0  33.0 - 36.0 g/dL Final    RDW 11/15/2024 12.4  11.5 - 17.0 % Final    Platelet 11/15/2024 224  130 - 400 x10(3)/mcL Final    MPV 11/15/2024 10.2  7.4 - 10.4 fL Final    Neut % 11/15/2024 41.1  % Final    Lymph % 11/15/2024 42.9  % Final    Mono % 11/15/2024 13.9  % Final    Eos % 11/15/2024 1.2  % Final    Basophil % 11/15/2024 0.7  % Final    Lymph # 11/15/2024 1.85  0.6 - 4.6 x10(3)/mcL Final    Neut # 11/15/2024 1.77 (L)  2.1 - 9.2 x10(3)/mcL Final    Mono # 11/15/2024 0.60  0.1 - 1.3 x10(3)/mcL Final    Eos # 11/15/2024 0.05  0 - 0.9 x10(3)/mcL Final    Baso # 11/15/2024 0.03  <=0.2 x10(3)/mcL Final    IG# 11/15/2024 0.01  0 - 0.04 x10(3)/mcL Final    IG% 11/15/2024 0.2  % Final    NRBC% 11/15/2024 0.0  % Final    DRVV Scr Ratio 11/15/2024 3.09   Final    DRVV Conf Ratio 11/15/2024 1.64   Final    DRVV Norm Ratio 11/15/2024 1.88 (H)  0.00 - 1.19 Final    Lupus Anticoag ST Calc 11/15/2024 3.8  0.0 - 7.9 seconds Final    Lupus Anticoagulant Interp 11/15/2024    Final                    Value:Interpretative Report for Lupus Anticoagulant    Interpretation:  Lupus anticoagulant present.     Impression:  Data are consistent with the presence of a lupus anticoagulant (LAC  ), provided anticoagulation effects and other interfering substances can be excluded (See comments). Clinical correlation is recommended in future follow-up studies (at a three month or greater time interval and remote from anticoagulation) should be considered to assess for lupus anticoagulant persistence, if appropriate.     Comment: Direct factor Xa inhibitors (rivaroxaban, apixaban, or edoxaban) can cause false-positive and false-negative results by the DRVVT methodology.  Clinical correlation is recommended and repeat testing remote from anticoagulation) should be considered, if clinically indicated.      Juan Pablo Leyva M.D.     Thrombin Time 11/15/2024 16  0 - 22 seconds Final    Color, UA 11/15/2024 Colorless (A)  Yellow, Light-Yellow, Dark Yellow, Lilian, Straw Final    Appearance, UA 11/15/2024 Clear  Clear Final    Specific [place], UA 11/15/2024 1.008  1.005 - 1.030 Final    pH, UA 11/15/2024 6.5  5.0 - 8.5 Final    Protein, UA 11/15/2024 Negative  Negative Final    Glucose, UA 11/15/2024 Normal  Negative, Normal Final    Ketones, UA 11/15/2024 Negative  Negative Final    Blood, UA 11/15/2024 Negative  Negative Final    Bilirubin, UA 11/15/2024 Negative  Negative Final    Urobilinogen, UA 11/15/2024 Normal  0.2, 1.0, Normal Final    Nitrites, UA 11/15/2024 Negative  Negative Final    Leukocyte Esterase, UA 11/15/2024 Negative  Negative Final    RBC, UA 11/15/2024 0-5  None Seen, 0-2, 3-5, 0-5 /HPF Final    WBC, UA 11/15/2024 None Seen  None Seen, 0-2, 3-5, 0-5 /HPF Final    Bacteria, UA 11/15/2024 Occasional (A)  None Seen /HPF Final    Squamous Epithelial Cells, UA 11/15/2024 Few (A)  None Seen /HPF Final    Mucous, UA 11/15/2024 Trace (A)  None Seen /LPF Final    Hyaline Casts, UA 11/15/2024 0-2 (A)  None Seen /lpf Final    Urine Microalbumin 11/15/2024 <5.0  <=30.0 ug/mL Final    Urine Creatinine 11/15/2024 41.3 (L)  45.0 - 106.0 mg/dL Final    Microalbumin Creatinine Ratio 11/15/2024    Final    Documentation Only on 11/14/2024   Component Date Value Ref Range Status    High Risk HPV 05/26/2022 Negative   Final    PAP Recommendation External 05/26/2022 Pap in 3 years   Final   Lab Visit on 11/12/2024   Component Date Value Ref Range Status    PARATHYROID HORMONE INTACT 11/12/2024 53.6  8.7 - 77.0 pg/mL Final    Ceruloplasmin, S 11/12/2024 25.6  20.0 - 51.0 mg/dL Final    Testosterone, Free 11/12/2024 0.77  <0.13 - 0.92 ng/dL Final    Testosterone, Total 11/12/2024 76 (H)  8 - 60 ng/dL Final    Immunoglobulin Subclass IgG4 11/12/2024 65.2  2.4 - 121.0 mg/dL Final    Cryoglobulin 11/12/2024 SEE COMMENTS  Negative %ppt Final    C1 Esterase Inhib, Functional, QN 11/12/2024 >90  % Final    C1 Esterase Inhibitor Antigen, S 11/12/2024 37  19 - 37 mg/dL Final    Porphyrins, Total, P 11/12/2024 <1.0  <=1.0 mcg/dL Final    Reviewed By 11/12/2024 SEE COMMENTS   Final    Interpretation 11/12/2024 SEE COMMENTS   Final    See Scanned Report 11/12/2024 See Scanned Result   Final       Assessment - Diagnosis - Goals:     Nahed Harvey, a 48 y.o. female, presenting for initial evaluation visit.     Impression:       ICD-10-CM ICD-9-CM   1. Depression, unspecified depression type  F32.A 311   2. KIERRA (generalized anxiety disorder)  F41.1 300.02     Strengths and Liabilities: Strength: Patient accepts guidance/feedback, Strength: Patient is expressive/articulate., Strength: Patient is intelligent., Liability: Patient lacks coping skills.    Treatment Goals:  Specify outcomes written in observable, behavioral terms:   Anxiety: reducing physical symptoms of anxiety and reducing time spent worrying (<30 minutes/day)  Depression: increasing energy, increasing interest in usual activities, increasing motivation, and reducing fatigue    Treatment Plan/Recommendations:   Start cymbalta 30mg daily, Discussed potential SE including but not limited to GI upset, headache, HTN, tachycardia, suicidal thinking  Continue elavil 10mg  nightly  Continue xanax prn anxiety or panic attack  Recommend pt establish with a psychotherapist/counselor, provided names of providers in the [facility]  Recent labwork in EMR reviewed  No need for PEC as pt is not an imminent danger to self or others or gravely disabled due to acute psychiatric illness  Discussed that pt should either call clinic for psychiatric crisis symptoms or present to nearest emergency room    Discussed with patient informed consent including diagnosis, risks and benefits of proposed treatment above vs. alternative treatments vs. no treatment, as well as serious and common side effects of these treatments, and the inherent unpredictability of individual responses to these treatments. The patient expresses understanding of the above and displays the capacity to agree with this current plan. Patient also agrees that, currently, the benefits outweigh the risks and would like to pursue treatment at this time, and had no other questions.    Instructions:  Take all medications as prescribed.    Abstain from recreational drugs and alcohol.  Present to ED or call 911 for SI/HI plan or intent, psychosis, or medical emergency.    Return to Clinic: Follow up in about 2 months (around 1/21/2025).    Total time:   Complexity (level) of medical decision making employed in the encounter: HIGH    The total time for services performed on the date of the encounter (including review of prior visit notes, review of notes from other providers, review of results from laboratory/imaging studies, face-to-face time with patient, and time spent on other activities directly related to patient care): 60 minutes.    Vernon Marie MD  [facility]

## 2024-11-30 DIAGNOSIS — R10.9 ABDOMINAL PAIN, UNSPECIFIED ABDOMINAL LOCATION: ICD-10-CM

## 2024-11-30 DIAGNOSIS — F41.1 GAD (GENERALIZED ANXIETY DISORDER): ICD-10-CM

## 2024-12-02 RX ORDER — ALPRAZOLAM 0.25 MG/1
TABLET ORAL
Qty: 30 TABLET | Refills: 0 | Status: SHIPPED | OUTPATIENT
Start: 2024-12-02

## 2024-12-02 RX ORDER — ONDANSETRON 4 MG/1
4 TABLET, ORALLY DISINTEGRATING ORAL EVERY 8 HOURS PRN
Qty: 20 TABLET | Refills: 0 | Status: SHIPPED | OUTPATIENT
Start: 2024-12-02

## 2024-12-09 ENCOUNTER — TELEPHONE (OUTPATIENT)
Dept: SMOKING CESSATION | Facility: CLINIC | Age: 49
End: 2024-12-09
Payer: MEDICAID

## 2024-12-09 NOTE — TELEPHONE ENCOUNTER
Pt left a voicemail stating that she needed to cancel both her and her , Wander Melgar's [facility] appointments scheduled today at 3 and 4 pm.  Pt stated that her 's father passed away yesterday and she will call back to reschedule.

## 2024-12-20 DIAGNOSIS — F32.A DEPRESSION, UNSPECIFIED DEPRESSION TYPE: ICD-10-CM

## 2024-12-20 DIAGNOSIS — R11.15 CYCLIC VOMITING SYNDROME: ICD-10-CM

## 2024-12-20 DIAGNOSIS — E53.8 FOLATE DEFICIENCY: ICD-10-CM

## 2024-12-20 DIAGNOSIS — I10 HYPERTENSION, UNSPECIFIED TYPE: Primary | ICD-10-CM

## 2024-12-20 DIAGNOSIS — E61.2 MAGNESIUM DEFICIENCY: ICD-10-CM

## 2024-12-20 DIAGNOSIS — F41.1 GAD (GENERALIZED ANXIETY DISORDER): ICD-10-CM

## 2024-12-20 DIAGNOSIS — I10 HYPERTENSION, UNSPECIFIED TYPE: ICD-10-CM

## 2024-12-20 RX ORDER — ALPRAZOLAM 0.25 MG/1
0.25 TABLET ORAL 3 TIMES DAILY PRN
Qty: 60 TABLET | Refills: 0 | Status: SHIPPED | OUTPATIENT
Start: 2024-12-20

## 2024-12-20 RX ORDER — FUROSEMIDE 20 MG/1
20 TABLET ORAL DAILY
Qty: 30 TABLET | Refills: 0 | Status: SHIPPED | OUTPATIENT
Start: 2024-12-20

## 2024-12-20 RX ORDER — METOPROLOL TARTRATE 50 MG/1
50 TABLET ORAL 2 TIMES DAILY
Qty: 60 TABLET | Refills: 3 | Status: SHIPPED | OUTPATIENT
Start: 2024-12-20

## 2024-12-20 RX ORDER — DULOXETIN HYDROCHLORIDE 30 MG/1
30 CAPSULE, DELAYED RELEASE ORAL DAILY
Qty: 30 CAPSULE | Refills: 5 | Status: CANCELLED | OUTPATIENT
Start: 2024-12-20 | End: 2025-12-20

## 2024-12-20 RX ORDER — CEFUROXIME AXETIL 250 MG/1
6 TABLET ORAL 2 TIMES DAILY
Qty: 6 ML | Refills: 0 | Status: SHIPPED | OUTPATIENT
Start: 2024-12-20 | End: 2024-12-26

## 2024-12-20 RX ORDER — MAGNESIUM 200 MG
400 TABLET ORAL DAILY
Qty: 60 EACH | Refills: 3 | Status: SHIPPED | OUTPATIENT
Start: 2024-12-20

## 2024-12-20 RX ORDER — FOLIC ACID 1 MG/1
1 TABLET ORAL DAILY
Qty: 360 TABLET | Refills: 0 | Status: SHIPPED | OUTPATIENT
Start: 2024-12-20 | End: 2025-12-20

## 2024-12-20 NOTE — TELEPHONE ENCOUNTER
Called pharm  Cymbalta was sent to pharmacy 11/24/24 with 5 refills  Unable to contact patient  There was no answer

## 2024-12-20 NOTE — TELEPHONE ENCOUNTER
Take with onset of migraine.  May repeat once after one hour if needed once a day. Maximum is 2 doses a day.

## 2024-12-21 DIAGNOSIS — R11.15 CYCLIC VOMITING SYNDROME: ICD-10-CM

## 2024-12-23 RX ORDER — CEFUROXIME AXETIL 250 MG/1
6 TABLET ORAL 2 TIMES DAILY
Qty: 6 ML | Refills: 0 | Status: SHIPPED | OUTPATIENT
Start: 2024-12-23 | End: 2024-12-29

## 2025-01-06 ENCOUNTER — TELEPHONE (OUTPATIENT)
Dept: SMOKING CESSATION | Facility: CLINIC | Age: 50
End: 2025-01-06
Payer: MEDICAID

## 2025-01-06 NOTE — TELEPHONE ENCOUNTER
Contacted pt regarding rescheduling her and her 's missed [facility] appointment.  Pt rescheduled to 1/30/25 at 10 am and her  Wander Melgar is scheduled at 11 am.

## 2025-01-12 DIAGNOSIS — R11.15 CYCLIC VOMITING SYNDROME: ICD-10-CM

## 2025-01-12 DIAGNOSIS — F41.1 GAD (GENERALIZED ANXIETY DISORDER): ICD-10-CM

## 2025-01-12 DIAGNOSIS — R10.9 ABDOMINAL PAIN, UNSPECIFIED ABDOMINAL LOCATION: ICD-10-CM

## 2025-01-12 DIAGNOSIS — I10 HYPERTENSION, UNSPECIFIED TYPE: ICD-10-CM

## 2025-01-13 RX ORDER — CEFUROXIME AXETIL 250 MG/1
6 TABLET ORAL 2 TIMES DAILY
Qty: 6 ML | Refills: 0 | Status: SHIPPED | OUTPATIENT
Start: 2025-01-13 | End: 2025-01-19

## 2025-01-13 RX ORDER — ONDANSETRON 4 MG/1
4 TABLET, ORALLY DISINTEGRATING ORAL EVERY 8 HOURS PRN
Qty: 20 TABLET | Refills: 0 | Status: SHIPPED | OUTPATIENT
Start: 2025-01-13

## 2025-01-13 RX ORDER — ALPRAZOLAM 0.25 MG/1
0.25 TABLET ORAL 3 TIMES DAILY PRN
Qty: 60 TABLET | Refills: 0 | Status: SHIPPED | OUTPATIENT
Start: 2025-01-13

## 2025-01-13 RX ORDER — FOLIC ACID 1 MG/1
1 TABLET ORAL DAILY
Qty: 325 TABLET | Refills: 0 | Status: SHIPPED | OUTPATIENT
Start: 2025-01-13 | End: 2025-12-04

## 2025-01-13 RX ORDER — FUROSEMIDE 20 MG/1
20 TABLET ORAL DAILY
Qty: 30 TABLET | Refills: 0 | Status: SHIPPED | OUTPATIENT
Start: 2025-01-13 | End: 2025-02-03 | Stop reason: SDUPTHER

## 2025-01-15 ENCOUNTER — TELEPHONE (OUTPATIENT)
Dept: INTERNAL MEDICINE | Facility: CLINIC | Age: 50
End: 2025-01-15
Payer: MEDICAID

## 2025-01-15 DIAGNOSIS — D68.61 APL (ANTIPHOSPHOLIPID SYNDROME): Primary | ICD-10-CM

## 2025-01-16 RX ORDER — RIVAROXABAN 20 MG/1
20 TABLET, FILM COATED ORAL DAILY
Qty: 90 TABLET | Refills: 2 | Status: SHIPPED | OUTPATIENT
Start: 2025-01-16 | End: 2025-10-13

## 2025-01-30 ENCOUNTER — CLINICAL SUPPORT (OUTPATIENT)
Dept: SMOKING CESSATION | Facility: CLINIC | Age: 50
End: 2025-01-30
Payer: MEDICAID

## 2025-01-30 DIAGNOSIS — F17.200 NICOTINE DEPENDENCE: Primary | ICD-10-CM

## 2025-01-30 RX ORDER — MICONAZOLE NITRATE 2 %
2 CREAM (GRAM) TOPICAL
Qty: 100 EACH | Refills: 0 | Status: SHIPPED | OUTPATIENT
Start: 2025-01-30

## 2025-01-30 RX ORDER — NICOTINE 7MG/24HR
1 PATCH, TRANSDERMAL 24 HOURS TRANSDERMAL DAILY
Qty: 28 PATCH | Refills: 0 | Status: SHIPPED | OUTPATIENT
Start: 2025-01-30

## 2025-01-30 NOTE — PROGRESS NOTES
Patient will be participating in tobacco cessation meetings and will begin the prescribed tobacco cessation medication regimen of 7 mg nicotine patch QD and 2 mg nicotine gum PRN (1-2 per hour in place of cigarettes). Patient currently smokes 5 cigarettes per day but stated that she only smokes half.  Discussed the role of tobacco cessation program, role of nicotine replacement therapy (NRT) and behavioral changes to assist the patient to reach her goal of being tobacco free. Education and instruction on the role of the NRT, usage and proper placement of the patch and gum.  Pt started on rate reduction and wait time of 15 min prior to smoking. Pt's exhaled carbon monoxide level was 6 ppm as per [employer]. (non- smoker = 0-5 ppm.)

## 2025-02-03 DIAGNOSIS — I10 HYPERTENSION, UNSPECIFIED TYPE: ICD-10-CM

## 2025-02-03 RX ORDER — FUROSEMIDE 20 MG/1
20 TABLET ORAL DAILY
Qty: 90 TABLET | Refills: 0 | Status: SHIPPED | OUTPATIENT
Start: 2025-02-03

## 2025-02-12 DIAGNOSIS — F41.1 GAD (GENERALIZED ANXIETY DISORDER): ICD-10-CM

## 2025-02-12 DIAGNOSIS — R10.9 ABDOMINAL PAIN, UNSPECIFIED ABDOMINAL LOCATION: ICD-10-CM

## 2025-02-13 RX ORDER — ONDANSETRON 4 MG/1
TABLET, ORALLY DISINTEGRATING ORAL
Qty: 20 TABLET | Refills: 0 | Status: SHIPPED | OUTPATIENT
Start: 2025-02-13

## 2025-02-13 RX ORDER — ALPRAZOLAM 0.25 MG/1
0.25 TABLET ORAL 3 TIMES DAILY
Qty: 60 TABLET | Refills: 0 | Status: SHIPPED | OUTPATIENT
Start: 2025-02-13

## 2025-02-22 DIAGNOSIS — E61.2 MAGNESIUM DEFICIENCY: ICD-10-CM

## 2025-02-22 DIAGNOSIS — R11.15 CYCLIC VOMITING SYNDROME: ICD-10-CM

## 2025-02-22 DIAGNOSIS — I10 HYPERTENSION, UNSPECIFIED TYPE: ICD-10-CM

## 2025-02-22 DIAGNOSIS — R10.9 ABDOMINAL PAIN, UNSPECIFIED ABDOMINAL LOCATION: ICD-10-CM

## 2025-02-24 RX ORDER — ONDANSETRON 4 MG/1
TABLET, ORALLY DISINTEGRATING ORAL
Qty: 20 TABLET | Refills: 0 | OUTPATIENT
Start: 2025-02-24

## 2025-02-24 RX ORDER — FUROSEMIDE 20 MG/1
20 TABLET ORAL DAILY
Qty: 90 TABLET | Refills: 0 | OUTPATIENT
Start: 2025-02-24

## 2025-02-24 RX ORDER — AMITRIPTYLINE HYDROCHLORIDE 10 MG/1
12.5 TABLET, FILM COATED ORAL NIGHTLY
Qty: 30 TABLET | Refills: 11 | OUTPATIENT
Start: 2025-02-24 | End: 2026-02-24

## 2025-02-25 ENCOUNTER — TELEPHONE (OUTPATIENT)
Dept: SMOKING CESSATION | Facility: CLINIC | Age: 50
End: 2025-02-25
Payer: MEDICAID

## 2025-02-26 NOTE — TELEPHONE ENCOUNTER
Contacted pt regarding rescheduling her and her , Wander Melgar canceled tobacco cessation follow up appointment.  Pt rescheduled to March 24, 2025 at 3 pm.

## 2025-03-03 RX ORDER — MAGNESIUM 200 MG
400 TABLET ORAL DAILY
Qty: 60 EACH | Refills: 3 | Status: SHIPPED | OUTPATIENT
Start: 2025-03-03

## 2025-04-02 ENCOUNTER — TELEPHONE (OUTPATIENT)
Dept: SMOKING CESSATION | Facility: CLINIC | Age: 50
End: 2025-04-02
Payer: MEDICAID

## 2025-04-02 NOTE — TELEPHONE ENCOUNTER
Left voice message with contact information regarding rescheduling her canceled follow up appointment.

## 2025-04-28 DIAGNOSIS — I10 HYPERTENSION, UNSPECIFIED TYPE: ICD-10-CM

## 2025-04-29 DIAGNOSIS — I10 HYPERTENSION, UNSPECIFIED TYPE: ICD-10-CM

## 2025-04-29 DIAGNOSIS — R10.9 ABDOMINAL PAIN, UNSPECIFIED ABDOMINAL LOCATION: ICD-10-CM

## 2025-04-29 DIAGNOSIS — R11.15 CYCLIC VOMITING SYNDROME: ICD-10-CM

## 2025-04-29 RX ORDER — ONDANSETRON 4 MG/1
4 TABLET, ORALLY DISINTEGRATING ORAL EVERY 12 HOURS PRN
Qty: 20 TABLET | Refills: 0 | Status: SHIPPED | OUTPATIENT
Start: 2025-04-29

## 2025-04-29 RX ORDER — FUROSEMIDE 20 MG/1
20 TABLET ORAL DAILY
Qty: 30 TABLET | Refills: 0 | Status: SHIPPED | OUTPATIENT
Start: 2025-04-29 | End: 2025-04-30

## 2025-04-29 RX ORDER — METOPROLOL TARTRATE 50 MG/1
50 TABLET ORAL 2 TIMES DAILY
Qty: 60 TABLET | Refills: 3 | Status: SHIPPED | OUTPATIENT
Start: 2025-04-29

## 2025-04-29 RX ORDER — CEFUROXIME AXETIL 250 MG/1
6 TABLET ORAL 2 TIMES DAILY
Qty: 6 ML | Refills: 0 | Status: SHIPPED | OUTPATIENT
Start: 2025-04-29 | End: 2025-05-05

## 2025-04-30 RX ORDER — FUROSEMIDE 20 MG/1
20 TABLET ORAL
Qty: 90 TABLET | Refills: 2 | Status: SHIPPED | OUTPATIENT
Start: 2025-04-30

## 2025-06-25 DIAGNOSIS — F41.1 GAD (GENERALIZED ANXIETY DISORDER): ICD-10-CM

## 2025-06-26 RX ORDER — ALPRAZOLAM 0.25 MG/1
TABLET ORAL
Qty: 60 TABLET | Refills: 0 | Status: SHIPPED | OUTPATIENT
Start: 2025-06-26

## 2025-07-23 ENCOUNTER — TELEPHONE (OUTPATIENT)
Dept: SMOKING CESSATION | Facility: CLINIC | Age: 50
End: 2025-07-23
Payer: MEDICAID

## 2025-07-26 DIAGNOSIS — I10 HYPERTENSION, UNSPECIFIED TYPE: ICD-10-CM

## 2025-07-28 RX ORDER — METOPROLOL TARTRATE 50 MG/1
50 TABLET ORAL 2 TIMES DAILY
Qty: 60 TABLET | Refills: 0 | Status: SHIPPED | OUTPATIENT
Start: 2025-07-28

## 2025-08-11 DIAGNOSIS — R10.9 ABDOMINAL PAIN, UNSPECIFIED ABDOMINAL LOCATION: ICD-10-CM

## 2025-08-11 RX ORDER — ONDANSETRON 4 MG/1
TABLET, ORALLY DISINTEGRATING ORAL
Qty: 20 TABLET | Refills: 0 | Status: SHIPPED | OUTPATIENT
Start: 2025-08-11

## 2025-08-18 DIAGNOSIS — E53.8 FOLATE DEFICIENCY: ICD-10-CM

## 2025-08-18 RX ORDER — FOLIC ACID 1 MG/1
1 TABLET ORAL DAILY
Qty: 360 TABLET | Refills: 0 | OUTPATIENT
Start: 2025-08-18 | End: 2026-08-18

## 2025-08-22 ENCOUNTER — HOSPITAL ENCOUNTER (OUTPATIENT)
Dept: RADIOLOGY | Facility: HOSPITAL | Age: 50
Discharge: HOME | End: 2025-08-22
Attending: INTERNAL MEDICINE
Payer: MEDICAID

## 2025-08-22 DIAGNOSIS — Z12.31 BREAST CANCER SCREENING BY MAMMOGRAM: ICD-10-CM

## 2025-08-22 DIAGNOSIS — I10 HYPERTENSION, UNSPECIFIED TYPE: ICD-10-CM

## 2025-08-22 PROCEDURE — 77067 SCR MAMMO BI INCL CAD: CPT | Mod: 26,,, | Performed by: RADIOLOGY

## 2025-08-22 PROCEDURE — 77063 BREAST TOMOSYNTHESIS BI: CPT | Mod: 26,,, | Performed by: RADIOLOGY

## 2025-08-22 PROCEDURE — 77063 BREAST TOMOSYNTHESIS BI: CPT | Mod: TC

## 2025-08-22 RX ORDER — METOPROLOL TARTRATE 50 MG/1
50 TABLET ORAL 2 TIMES DAILY
Qty: 60 TABLET | Refills: 0 | Status: SHIPPED | OUTPATIENT
Start: 2025-08-22

## 2025-08-26 ENCOUNTER — OFFICE VISIT (OUTPATIENT)
Dept: INTERNAL MEDICINE | Facility: CLINIC | Age: 50
End: 2025-08-26
Payer: MEDICAID

## 2025-08-26 VITALS
WEIGHT: 153 LBS | OXYGEN SATURATION: 97 % | BODY MASS INDEX: 26.12 KG/M2 | SYSTOLIC BLOOD PRESSURE: 110 MMHG | HEIGHT: 64 IN | RESPIRATION RATE: 18 BRPM | DIASTOLIC BLOOD PRESSURE: 82 MMHG | HEART RATE: 90 BPM | TEMPERATURE: 98 F

## 2025-08-26 DIAGNOSIS — I26.99 BILATERAL PULMONARY EMBOLISM: ICD-10-CM

## 2025-08-26 DIAGNOSIS — K21.00 GASTROESOPHAGEAL REFLUX DISEASE WITH ESOPHAGITIS WITHOUT HEMORRHAGE: ICD-10-CM

## 2025-08-26 DIAGNOSIS — D69.6 THROMBOCYTOPENIA: ICD-10-CM

## 2025-08-26 DIAGNOSIS — Z12.11 COLON CANCER SCREENING: ICD-10-CM

## 2025-08-26 DIAGNOSIS — F41.9 ANXIETY: Primary | ICD-10-CM

## 2025-08-26 DIAGNOSIS — E55.9 VITAMIN D DEFICIENCY: ICD-10-CM

## 2025-08-26 DIAGNOSIS — F32.0 CURRENT MILD EPISODE OF MAJOR DEPRESSIVE DISORDER WITHOUT PRIOR EPISODE: ICD-10-CM

## 2025-08-26 DIAGNOSIS — R11.15 CYCLIC VOMITING SYNDROME: ICD-10-CM

## 2025-08-26 DIAGNOSIS — E53.8 FOLATE DEFICIENCY: ICD-10-CM

## 2025-08-26 DIAGNOSIS — F41.1 GAD (GENERALIZED ANXIETY DISORDER): ICD-10-CM

## 2025-08-26 DIAGNOSIS — I10 PRIMARY HYPERTENSION: ICD-10-CM

## 2025-08-26 DIAGNOSIS — I10 PRIMARY HYPERTENSION: Primary | ICD-10-CM

## 2025-08-26 DIAGNOSIS — E53.8 VITAMIN B12 DEFICIENCY (NON ANEMIC): ICD-10-CM

## 2025-08-26 DIAGNOSIS — R76.8 ANTINEUTROPHIL CYTOPLASMIC ANTIBODY (ANCA) POSITIVE: ICD-10-CM

## 2025-08-26 DIAGNOSIS — G43.001 MIGRAINE WITHOUT AURA AND WITH STATUS MIGRAINOSUS, NOT INTRACTABLE: ICD-10-CM

## 2025-08-26 DIAGNOSIS — M10.00 IDIOPATHIC GOUT, UNSPECIFIED CHRONICITY, UNSPECIFIED SITE: ICD-10-CM

## 2025-08-26 DIAGNOSIS — R79.89 ELEVATED TESTOSTERONE LEVEL IN FEMALE: ICD-10-CM

## 2025-08-26 DIAGNOSIS — M10.9 GOUT, UNSPECIFIED CAUSE, UNSPECIFIED CHRONICITY, UNSPECIFIED SITE: ICD-10-CM

## 2025-08-26 PROCEDURE — 99215 OFFICE O/P EST HI 40 MIN: CPT | Mod: PBBFAC | Performed by: INTERNAL MEDICINE

## 2025-08-26 RX ORDER — HYDRALAZINE HYDROCHLORIDE 25 MG/1
50 TABLET, FILM COATED ORAL EVERY 8 HOURS
Qty: 180 TABLET | Refills: 2 | Status: SHIPPED | OUTPATIENT
Start: 2025-08-26 | End: 2025-11-24

## 2025-08-26 RX ORDER — ALLOPURINOL 100 MG/1
100 TABLET ORAL DAILY
Qty: 90 TABLET | Refills: 1 | Status: SHIPPED | OUTPATIENT
Start: 2025-08-26

## 2025-08-26 RX ORDER — FAMOTIDINE 40 MG/1
40 TABLET, FILM COATED ORAL DAILY
Qty: 30 TABLET | Refills: 11 | Status: SHIPPED | OUTPATIENT
Start: 2025-08-26 | End: 2026-08-26

## 2025-08-26 RX ORDER — BUPROPION HYDROCHLORIDE 300 MG/1
300 TABLET ORAL DAILY
COMMUNITY
Start: 2025-08-21

## 2025-08-26 RX ORDER — FLUOXETINE 20 MG/1
20 CAPSULE ORAL DAILY
COMMUNITY

## 2025-08-26 RX ORDER — CETIRIZINE HYDROCHLORIDE 10 MG/1
10 TABLET ORAL 2 TIMES DAILY
Qty: 720 TABLET | Refills: 0 | Status: SHIPPED | OUTPATIENT
Start: 2025-08-26 | End: 2026-08-26

## 2025-08-26 RX ORDER — AMITRIPTYLINE HYDROCHLORIDE 10 MG/1
10 TABLET, FILM COATED ORAL NIGHTLY
Qty: 360 TABLET | Refills: 0 | Status: SHIPPED | OUTPATIENT
Start: 2025-08-26 | End: 2026-08-26

## 2025-08-27 RX ORDER — ZONISAMIDE 100 MG/1
100 CAPSULE ORAL
Qty: 30 CAPSULE | Refills: 6 | Status: SHIPPED | OUTPATIENT
Start: 2025-08-27 | End: 2026-08-27

## (undated) DEVICE — DRESSING TRANS 4X4 TEGADERM

## (undated) DEVICE — CATH TRIEVER 20 CRV 20FR 105CM

## (undated) DEVICE — CATH TRIEVER24 OTW 24FR 95CM

## (undated) DEVICE — CATH EMPULSE ANGLED 5FR PIGTAI

## (undated) DEVICE — GUIDEWIRE AMPLTZ SS STR  145X7

## (undated) DEVICE — GUIDEWIRE AMPLATZ .035X260 SS

## (undated) DEVICE — Device

## (undated) DEVICE — KIT VASCULAR DILATOR PAS

## (undated) DEVICE — GUIDEWIRE AMPLATZ STIFF 260X7

## (undated) DEVICE — PAD DEFIB CADENCE ADULT R2

## (undated) DEVICE — CATH IMPULSE 5FR PIGTAIL 125CM

## (undated) DEVICE — FILTER FLOWSAVER BLOOD RETURN

## (undated) DEVICE — SHEATH INTRODUCER 5FR 10CM

## (undated) DEVICE — SHEATH HEMOSTASIS 10FR 12CM

## (undated) DEVICE — SET ANGIO ACIST CVI ANGIOTOUCH

## (undated) DEVICE — GUIDEWIRE STF .035X260CM ANG

## (undated) DEVICE — CANNULA NASAL ADULT

## (undated) DEVICE — KIT MINI STK MAX COAX 5FR 10CM

## (undated) DEVICE — GUIDEWIRE INQWIRE SE 3MM JTIP

## (undated) DEVICE — KIT SNARE RETRIEVAL 20MM

## (undated) DEVICE — COVER PROBE US 5.5X58L NON LTX

## (undated) DEVICE — CATH IMPULSE MPA1 5FR 100CM

## (undated) DEVICE — SUT SILK 0 BLK BR CT-1 30IN

## (undated) DEVICE — DRAPE ANGIO BRACH 38X44IN

## (undated) DEVICE — CATH QUICK-CROSS .035 X 90